# Patient Record
Sex: MALE | Race: BLACK OR AFRICAN AMERICAN | Employment: UNEMPLOYED | URBAN - METROPOLITAN AREA
[De-identification: names, ages, dates, MRNs, and addresses within clinical notes are randomized per-mention and may not be internally consistent; named-entity substitution may affect disease eponyms.]

---

## 2017-01-10 ENCOUNTER — TELEPHONE (OUTPATIENT)
Dept: FAMILY MEDICINE | Facility: CLINIC | Age: 22
End: 2017-01-10

## 2017-01-10 NOTE — TELEPHONE ENCOUNTER
Called Ravindra at the request of the psychiatry consult team to find out if he was still interested in psychiatry services.  He was scheduled for a psychiatry consult at the beginning of this month and cancelled the appt.  He also had an appt with MARTINA Alexis for therapy, but this appt was missed.    Ravindra states he is feeling better, not taking medications currently, and not interested in pursuing a psychiatry appt right now.  He states he is still interested in therapy.  We rescheduled him with Tania on 1/17 at 1 pm.      He is going to talk to his  so that he can get a bus token so he can get to that appt. He stated that he was able to do this.  Let him know if he is having trouble figuring out how he will get to clinic he can call here for additional assistance.

## 2021-04-09 ENCOUNTER — HOSPITAL ENCOUNTER (EMERGENCY)
Facility: CLINIC | Age: 26
Discharge: NURSING, REHAB OR LONG-TERM CARE | End: 2021-04-10
Attending: EMERGENCY MEDICINE | Admitting: EMERGENCY MEDICINE
Payer: COMMERCIAL

## 2021-04-09 VITALS
TEMPERATURE: 98.1 F | RESPIRATION RATE: 18 BRPM | HEIGHT: 60 IN | BODY MASS INDEX: 27.07 KG/M2 | OXYGEN SATURATION: 97 % | HEART RATE: 107 BPM | WEIGHT: 137.9 LBS | DIASTOLIC BLOOD PRESSURE: 71 MMHG | SYSTOLIC BLOOD PRESSURE: 118 MMHG

## 2021-04-09 DIAGNOSIS — F25.9 SCHIZOAFFECTIVE DISORDER, UNSPECIFIED TYPE (H): ICD-10-CM

## 2021-04-09 LAB
LABORATORY COMMENT REPORT: NORMAL
SARS-COV-2 RNA RESP QL NAA+PROBE: NEGATIVE
SPECIMEN SOURCE: NORMAL

## 2021-04-09 PROCEDURE — 90791 PSYCH DIAGNOSTIC EVALUATION: CPT

## 2021-04-09 PROCEDURE — 99203 OFFICE O/P NEW LOW 30 MIN: CPT | Performed by: PSYCHIATRY & NEUROLOGY

## 2021-04-09 PROCEDURE — 99207 PR CDG-CODE CATEGORY CHANGED: CPT | Performed by: PSYCHIATRY & NEUROLOGY

## 2021-04-09 PROCEDURE — C9803 HOPD COVID-19 SPEC COLLECT: HCPCS

## 2021-04-09 PROCEDURE — 87635 SARS-COV-2 COVID-19 AMP PRB: CPT | Performed by: EMERGENCY MEDICINE

## 2021-04-09 PROCEDURE — 99285 EMERGENCY DEPT VISIT HI MDM: CPT | Mod: 25

## 2021-04-09 RX ORDER — FLUOXETINE 10 MG/1
10 CAPSULE ORAL DAILY
COMMUNITY

## 2021-04-09 RX ORDER — OLANZAPINE 5 MG/1
5 TABLET ORAL AT BEDTIME
COMMUNITY
End: 2021-05-21

## 2021-04-09 RX ORDER — LANOLIN ALCOHOL/MO/W.PET/CERES
1000 CREAM (GRAM) TOPICAL DAILY
COMMUNITY

## 2021-04-09 RX ORDER — MIRTAZAPINE 30 MG/1
30 TABLET, FILM COATED ORAL AT BEDTIME
COMMUNITY

## 2021-04-09 ASSESSMENT — ENCOUNTER SYMPTOMS
DYSPHORIC MOOD: 0
DYSPHORIC MOOD: 1
NERVOUS/ANXIOUS: 0
NERVOUS/ANXIOUS: 1
HALLUCINATIONS: 1
SHORTNESS OF BREATH: 1

## 2021-04-09 ASSESSMENT — MIFFLIN-ST. JEOR: SCORE: 1400.39
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
## 2021-04-10 NOTE — PLAN OF CARE
Discharge instructions reviewed with patient including follow-up care plan. Educated on medication regime and advised not to stop prescribed medication without consulting their physician. Reviewed safety plan and outpatient resources.Denies SI. All belongings which where brought into the hospital have been returned to patient. Escorted off the unit at 0010  accompanied by [facility] and Michelle Discharged to [facility] via Taxi.

## 2021-04-10 NOTE — DISCHARGE INSTRUCTIONS
Follow up with your established providers    Follow up with [facility] ([phone]) to establish case management

## 2021-04-10 NOTE — PLAN OF CARE
Problem: Adult Inpatient Plan of Care  Goal: Plan of Care Review  Outcome: Adequate for Discharge  Goal: Patient-Specific Goal (Individualized)  Outcome: Adequate for Discharge  Goal: Absence of Hospital-Acquired Illness or Injury  Outcome: Adequate for Discharge  Goal: Optimal Comfort and Wellbeing  Outcome: Adequate for Discharge  Goal: Readiness for Transition of Care  Outcome: Adequate for Discharge     Problem: Behavioral Health Plan of Care  Goal: Plan of Care Review  Outcome: Adequate for Discharge  Goal: Patient-Specific Goal (Individualization)  Outcome: Adequate for Discharge  Goal: Adheres to Safety Considerations for Self and Others  Outcome: Adequate for Discharge  Goal: Absence of New-Onset Illness or Injury  Outcome: Adequate for Discharge  Goal: Optimized Coping Skills in Response to Life Stressors  Outcome: Adequate for Discharge  Goal: Develops/Participates in Therapeutic [place] to Support Successful Transition  Outcome: Adequate for Discharge     Problem: Social, Occupational or Functional Impairment (Psychotic Signs/Symptoms)  Goal: Enhanced Social, Occupational or Functional Skills (Psychotic Signs/Symptoms)  Outcome: Adequate for Discharge     Problem: Sleep Disturbance (Psychotic Signs/Symptoms)  Goal: Improved Sleep (Psychotic Signs/Symptoms)  Outcome: Adequate for Discharge     Problem: Sensory Perception Impairment (Psychotic Signs/Symptoms)  Goal: Decreased Sensory Symptoms (Psychotic Signs/Symptoms)  Outcome: Adequate for Discharge     Problem: Cognitive Impairment (Psychotic Signs/Symptoms)  Goal: Optimal Cognitive Function (Psychotic Signs/Symptoms)  Outcome: Adequate for Discharge     Problem: Behavior Regulation Impairment (Psychotic Signs/Symptoms)  Goal: Improved Behavioral Control (Psychotic Signs/Symptoms)  Outcome: Adequate for Discharge

## 2021-04-10 NOTE — ED PROVIDER NOTES
ED Psychiatric EmPATH Note  [facility] Emergency Department - EmPATH Unit    Ravindra Oro MRN: [id number]   Age: 26 year old YOB: 1995     History     Chief Complaint   Patient presents with     Mental Health Problem     The history is provided by the patient and medical records.     Ravindra Oro is a 26 year old male who comes to the EmPATH due to him being paranoid.  He came from a homeless shelter due to feeling that he was being poisoned. He was complaining of chest pain but then that improved once he was at the ED due to not having the poisoned air.  Per the records, this is baseline for him. He is not suicidal or homicidal. He is not responding to internal stimuli and denies hallucinations. He has his medications with him.  He would like to go back to a shelter or crisis bed.      Past Medical History  No past medical history on file.  No past surgical history on file.  Fluocinolone Acetonide (DERMA-SMOOTHE/FS SCALP) 0.01 % OIL  ketoconazole (NIZORAL) 2 % shampoo  cholecalciferol (VITAMIN D) 1000 UNIT tablet  cyanocobalamin (VITAMIN B-12) 1000 MCG tablet  FLUoxetine (PROZAC) 10 MG capsule  FLUoxetine (PROZAC) 20 MG capsule  mirtazapine (REMERON) 30 MG tablet  OLANZapine (ZYPREXA) 5 MG tablet      No Known Allergies  Family History  Family History   Problem Relation Age of Onset     Cancer No family hx of         No family history of skin cancer     Melanoma No family hx of      Skin Cancer No family hx of      Social History   Social History     Tobacco Use     Smoking status: Never Smoker     Smokeless tobacco: Never Used   Substance Use Topics     Alcohol use: No     Drug use: No      Past medical history, past surgical history, medications, allergies, family history, and social history were reviewed with the patient. No additional pertinent items.       Review of Systems   Psychiatric/Behavioral: Negative for dysphoric mood, self-injury and suicidal ideas. Hallucinations: paranoid. The  "patient is not nervous/anxious.      A complete review of systems was performed with pertinent positives and negatives noted in the HPI, and all other systems negative.    Physical Examination   BP: (!) 130/91  Pulse: 87  Temp: 99.1  F (37.3  C)  Resp: 11  Height: 144.8 cm (4' 9\")  Weight: 62.6 kg (137 lb 14.4 oz)  SpO2: 100 %    Physical Exam  General:  Appears stated age.   Neuro: alert and fully oriented. CN II-XII grossly intact. Grossly normal strength and sensation in all extremities.   Integumentary/Skin: no rash visualized, normal color    Psychiatric Examination   Appearance: awake, alert  Attitude:  cooperative  Eye Contact:  good  Mood:  good  Affect:  appropriate and in normal range  Speech:  clear, coherent  Psychomotor Behavior:  no evidence of tardive dyskinesia, dystonia, or tics  Throught Process:  goal oriented  Associations:  no loose associations  Thought Content:  no evidence of suicidal ideation or homicidal ideation and chronic paranoia  Insight:  fair  Judgement:  intact  Oriented to:  time, person, and place  Attention Span and Concentration:  intact  Recent and Remote Memory:  intact    ED Course        Labs Ordered and Resulted from Time of ED Arrival Up to the Time of Departure from the ED   SARS-COV-2 (COVID-19) VIRUS RT-PCR       Assessments & Plan (with Medical Decision Making)   Patient presenting with chronic paranoia. Nursing notes reviewed.     Ravindra will be discharged. He is not an imminent risk to himself or others. He has chronic paranoia that is baseline for him. He will be set up with a crisis bed at [facility].  He has his medications with him.      I have reviewed the DEC assessment complete by Lenard Paiz dated 4/9/21.    Preliminary diagnosis:  Schizoaffective disorder    --  Cj Cortez MD   [facility] EMERGENCY DEPT  EmPATH Unit  4/9/2021        Cj Cortez MD  04/09/21 2209    "

## 2021-04-10 NOTE — CONSULTS
4/9/2021  Ravindra Oro 1995     [facility] Mental Health Assessment:    Started at: 8:45 pm   Completed at: 10:15 pm  What type of assessment are you doing today? Full DA    1.  Presenting Problem:      Referral Method to ED? Community Provider and Medics     What brings the patient to the ED today? Patient complained of someone poisoning his room at [facility] so staff called for EMS to have him assessed.  He presents with paranoia and depression with a lengthy history of homelessness.     Has this happened before? Yes Most recent was 3/25/2021.  Several ED visits during February 2021    Duration of presenting problem: ongoing.     Additional Stressors: homelessness, paranoia    2.  Risk Assessment:  Suicide and Self-Harm    ESS-6  1.a. Over the past 2 weeks, have you had thoughts of killing yourself? No   1.b. Have you ever attempted to kill yourself and, if yes, when did this last happen? No  2. Recent or current suicide plan? No  3. Recent or current intent to act on ideation? No  4. Lifetime psychiatric hospitalization? No  5. Pattern of excessive substance use? No  6. Current irritability, agitation, or aggression? No  ESS-6 Score: 0    SI: N/A  Plan: No  Intent: No   Prior Attempts: No     Protective Factors: wants to work with case management, wants housing, wants a job    Hopes and goals for the future: get housing, get a job, not be followed by police anymore    Coping Skills: What helps and doesn't help? Taking medications    Additional Risk Factors Related to Safety and Suicide: No    Is the patient engaged in self injurious behaviors? No     Risk to Others    Aggressive/Assaultive/Homicidal Risk Factors: No     Duty to Warn? No     Was a Child Protection Report Made? No       Was a Adult Protection Report Made? No        Sexually inappropriate behavior? No        Vulnerability to sexual exploitation? No     Additional information: n/a      3. Mental Health Symptoms and Substance Use  Current  Symptoms and Mental Health History    GAIN Short Screener (GAIN-SS) administered? NA    Attention, Hyperactivity, and Impulsivity Symptoms      Patient reported symptoms related to hyperactivity, inattention, or impulsivity? No  Patient does not endorse these symptoms.  He believes he has a good sense of control.      Anxiety Symptoms    Patient reported anxiety symptoms? Yes: Generalized Symptoms: Pacing     Patient was observed to be pacing in the sensory room while talking to the camera as I approached to complete this assessment.  He denies having any other anxiety or symptoms related.     Behavioral Difficulties    Patient reported behavioral difficulties? No   Patient presents with a calm demeanor, open to talking with me, states there are some personal questions that can be asked by a provider but not by someone else, and identifies feeling comfortable talking to me.     Mood Symptoms    Patient reported mood disorder symptoms? Yes: Sad, depressed mood    Patient endorses being depressed and wants to stopped being followed by the .     Eating Disorders and Appetite Disturbance      Patient reported appetite symptoms? Yes: Other: Patient asked if there is a meal available for him to eat but decliend to walked with me to the food area on the unit to see what was available.  When I went back to provide him options of what is availalbe, he declined again.        SCOFF  Do you make yourself sick (induce vomiting) because you feel uncomfortably full? n/a   Do you worry that you have lost Control over how much you eat? n/a  Have you recently lost more than 14 lb in a three-month period? n/a   Do you think you are too fat, even though others say you are too thin? n/a   Would you say that food dominates your life? n/a  SCOFF Score: n/a    Patient reported appetite or eating disorder symptoms? No      Interpersonal Functioning     Patient reported difficulties that may be associated with personality and  interpersonal functioning? No  Patient is approachable and conversant.  Chart review indicates he likes to spend time alone while he was at [facility].     Learning Disabilities/Cognitive/Developmental Disorders    Patient reported concerns related to learning disabilities, cognitive challenges, and/or developmental disorders? No   Patient does not endorse or have any observable limitations in this area.     General Cognitive Impairments    Patient reported symptoms of cognitive impairments? No  If yes, complete Mini-Cog Assessment.    Sleep Disturbance    Patient reported difficulties with sleep? No   Patient reports that he is able to sleep just fine if he has a safe place to sleep at night.      Psychosis Symptoms    Patient reported symptoms of psychosis? Yes: Delusions: Paranoid: believes that the government is following him and poisoning the air around him.  and Paranoia    Patient declines hallucinations.  He does not identify the paranoia as a delusional thought.     Trauma and Post-Traumatic Stress Disorder    Physical Abuse: No   Emotional/Psychological Abuse: No  Sexual Abuse: No  Loss of a friend or family member to suicide: No  Other Traumatic Event: No     Patient reported trauma related symptoms? No   Patient does not endorse having any trauma or related symptoms.     Impact of Mental Health on Functioning      Negative Impact Score: 4/10   Subjective Impact on functioning: patient does not seem to affected by today's events.  He just wants to make sure he can get his backpack from [facility] and get connected with a county .   How do symptoms vary from baseline? This appears to be his baseline per chart notes and his history.     Current and Historical Substance Use Note:    IIs there a history of, or current, substance use? No     Have you been to chemical dependency treatment or detox before? No     CAGE-AID    Have you felt you ought to cut down on your drinking or drug  use? No     Have people annoyed you by criticizing your drinking or drug use? No   Have you felt bad or guilty about your drinking or drug use? No  Have you ever had a drink or used drugs first thing in the morning to steady your nerves or to get rid of a hangover? No   CAGE-AID Score: 0/4    Drug screen completed? No   BAL/Breathalyzer completed? No       Mental Status Exam:    Affect: Appropriate  Appearance: Other: slightly disheveled appearance due to being faintly malodorous   Attention Span/Concentration: Attentive    Eye Contact: Variable  Fund of Knowledge: Appropriate   Language /Speech Content: Fluent  Language /Speech Volume: Normal   Language /Speech Rate/Productions: Normal but slightly mumbled  Recent Memory: Intact  Remote Memory: Intact  Mood: Depressed and Sad   Orientation:   Person: Yes   Place: Yes  Time of Day: Yes   Date: Yes   Situation (Do they understand why they are here?): Yes   Psychomotor Behavior: Normal   Thought Content: Delusions, Paranoia and Other: yet seems to be baseline  Thought Form: Intact    4. Social and Environmental Conditions   Is the patient their own guardian? Yes    Living Situation: Homeless, duration: for as long as he can remember    Support system and quality of connections: no personal supports identified even when asked about family or friends    Income source: General Assistance: and EBT    Issues with employment or education: Yes wants to work but cannot due to having an  Green Card    Legal Concerns  Do you have any history of or current involvement with the legal system? No    Spiritual and Cultural Influences  Do you have any Jehovah's witness beliefs that are important in your life? No     Do you have any cultural influences in your life that impact your mental health care? No        5. Psychiatric History, Medical History, and Current Care      Patient Mental Health Services   Does the patient have a history of mental health concerns/diagnoses? Yes  Schizoaffective Disorder, Depression, PTSD     Current Providers  Primary Care Provider: Yes Leonel Dior; [facility]   Psychiatrist: No   Therapist: Yes he reports having one but does not know where they are located   : No   ARM: Yes reports having this service but cannot identify the provider   ACT Team: No   Other: Yes [facility] advocate but does not know their name    History of Commitment? No  History of Psychiatric Hospitalizations? Yes in 2020 and several ED visits for mental health in 2021 so far   History of programmatic care? No    Family Mental Health History   Family History of Mental Health or Chemical Dependency Issues? No     Development and Physical Health Challenges  Delays or concerns meeting developmental milestones? No  Current psychotropic medications? Yes does not know the names of his names   Medication Compliant? Yes   Recent medication changes? No    History of concussion or TBI? No     Additional Information: Patient does not recall many details of his history.     6. Collateral Information and Collaboration    Collaboration with medical staff:Referral Information:   Medical Records and Psychiatry     Collateral Information/Sources: Medical Records: EPIC notes    7. Assessment and Diagnosis  Assessment of patient strengths and vulnerabilities    Strengths, Protective Factors, & Community Resources: identifies having good self control, does not use alcohol or drugs, awareness of community crisis resources, wants to go back to [facility], wants to be employed    Patient skills, abilities, and coping skills (what is going well?): awareness of community crisis resources, clear communication, identifies writer as a good person    Patient vulnerabilities: homelessness, lack of personal and professional supports, limited income, limited ability to follow up on appointments and with providers    Diagnosis  F25.9 Schizoaffective Disorder    8.Therapeutic  Methodologies Utilized in Assessment    Psychotherapy techniques and/or interventions used: Establishing rapport, Active listening, Establish a discharge plan and Safety planning    9. Patient Care/Treatment Plan  Summary of Patient Presentation and needs  What are the basic needs for this patient in this moment? Find a crisis residence to go to tonight      Consultations :  Attending provider consulted? Yes  Attending Name: Dr. Cortez   Attending concurs with disposition? Yes     Recommended disposition: Residential Treatment: [facility]     Does the patient agree with the recommended level of care? Yes    Final disposition: Residential treatment: [facility]    Disposition Details: discharging to [facility]    If Inpatient, is patient admitted voluntary? Yes   Patient aware of potential for transfer if there is not appropriate placement? NA  Patient is willing to travel outside of the [facility] for placement? NA   Central Intake Notified? No    10. Patient Care Document: Safety and After Care Planning:          Safety Plan Provided? No    Follow-Up Plans and Providers: connect with [facility] to establish case management services; stay at [facility] for the full length of stay    Follow-Up Plan:  After care plan provided to the patient/guardian by: yes via AVS  After care plan provided to any additional sources/parties? No    Duration of face to face time with patient in minutes: .75 hrs    CPT code(s) utilized: 68168 - Psychotherapy for Crisis - 60 (30-74*) min      JAGDISH Davis

## 2021-04-10 NOTE — PLAN OF CARE
"Problem: Behavior Regulation Impairment (Psychotic Signs/Symptoms)  Goal: Improved Behavioral Control (Psychotic Signs/Symptoms)  Outcome: Improving    26 year old male received from ER due to paranoid psychosis, anxiety, and depression. Patient reported in the ED that the government was out to attack him and that his crisis house was trying to poison him. However, he denied all paranoid thoughts once he arrived on [facility] unit and stated, \"I feel safe at my home. The people are so nice there\". Currently denies SI/HI. Patient presents with tense, paranoid and anxious in mood. Patient search was not completed due to patient declination. This was approved by MD for patient to come on the unit. Nursing assessment complete including patient and medication profiles. Risk assessments completed addressing suicide and safety issues. Care plan initiated. Video monitoring in progress, Patient Informed.       "

## 2021-04-25 ENCOUNTER — HOSPITAL ENCOUNTER (EMERGENCY)
Facility: CLINIC | Age: 26
Discharge: HOME | End: 2021-04-25
Attending: EMERGENCY MEDICINE | Admitting: EMERGENCY MEDICINE
Payer: COMMERCIAL

## 2021-04-25 VITALS
OXYGEN SATURATION: 97 % | RESPIRATION RATE: 16 BRPM | HEART RATE: 76 BPM | SYSTOLIC BLOOD PRESSURE: 136 MMHG | TEMPERATURE: 97.4 F | DIASTOLIC BLOOD PRESSURE: 85 MMHG

## 2021-04-25 DIAGNOSIS — F25.9 SCHIZOAFFECTIVE DISORDER, UNSPECIFIED TYPE (H): ICD-10-CM

## 2021-04-25 LAB
AMPHETAMINES UR QL SCN: NEGATIVE
BARBITURATES UR QL: NEGATIVE
BENZODIAZ UR QL: NEGATIVE
CANNABINOIDS UR QL SCN: NEGATIVE
COCAINE UR QL: NEGATIVE
ETHANOL UR QL SCN: NEGATIVE
OPIATES UR QL SCN: NEGATIVE

## 2021-04-25 PROCEDURE — 90791 PSYCH DIAGNOSTIC EVALUATION: CPT

## 2021-04-25 PROCEDURE — 80320 DRUG SCREEN QUANTALCOHOLS: CPT | Performed by: EMERGENCY MEDICINE

## 2021-04-25 PROCEDURE — 250N000013 HC RX MED GY IP 250 OP 250 PS 637: Performed by: EMERGENCY MEDICINE

## 2021-04-25 PROCEDURE — 80307 DRUG TEST PRSMV CHEM ANLYZR: CPT | Performed by: EMERGENCY MEDICINE

## 2021-04-25 PROCEDURE — 99283 EMERGENCY DEPT VISIT LOW MDM: CPT | Performed by: EMERGENCY MEDICINE

## 2021-04-25 PROCEDURE — 99285 EMERGENCY DEPT VISIT HI MDM: CPT | Mod: 25 | Performed by: EMERGENCY MEDICINE

## 2021-04-25 RX ORDER — OLANZAPINE 10 MG/1
10 TABLET, ORALLY DISINTEGRATING ORAL ONCE
Status: DISCONTINUED | OUTPATIENT
Start: 2021-04-25 | End: 2021-04-25 | Stop reason: HOSPADM

## 2021-04-25 RX ORDER — ACETAMINOPHEN 325 MG/1
975 TABLET ORAL ONCE
Status: COMPLETED | OUTPATIENT
Start: 2021-04-25 | End: 2021-04-25

## 2021-04-25 RX ADMIN — ACETAMINOPHEN 975 MG: 325 TABLET, FILM COATED ORAL at 11:52

## 2021-04-25 ASSESSMENT — ENCOUNTER SYMPTOMS: DYSPHORIC MOOD: 1

## 2021-04-25 NOTE — ED NOTES
Bed: HW01  Expected date: 4/25/21  Expected time: 8:41 AM  Means of arrival:   Comments:  [address]  26M  SI

## 2021-04-25 NOTE — ED PROVIDER NOTES
"ED Provider Note  [facility]      History     Chief Complaint   Patient presents with     Suicidal     EMS picked up at [facility], wanted  to kill him     Mental Health Problem     Delusional and paranoid thinking.     HPI  Ravindra Oro is a 26 year old male with a PMH of homelessness, schizoaffective disorder, severe MDD, ESCOBAR, PTSD and delusional disorder who presents to the ED today complaining of a mental health issue.  Patient states he is tired of life and thinks it will not get better.  He states he moved here from [facility] 10 years ago and is thinking about moving back.  He states he regrets coming to [place].  He states he was in school, but is not anymore.  He states he is currently homeless and needs to be staying at the [facility].  Patient states he works tomorrow at 7:30 AM.  Here in the ED he endorses depression.  Patient denies use of any drugs or alcohol.    Patient was seen earlier today at [facility] for paranoid delusions and psychosis.  He believed police officers were following him around the community.  He also believed that people are poisoning him and damaging his face.  He stated that \"this is not my real face.\"  He asked for assistance to leave [place].    Past Medical History  No past medical history on file.  No past surgical history on file.  cholecalciferol (VITAMIN D) 1000 UNIT tablet  cyanocobalamin (VITAMIN B-12) 1000 MCG tablet  Fluocinolone Acetonide (DERMA-SMOOTHE/FS SCALP) 0.01 % OIL  FLUoxetine (PROZAC) 10 MG capsule  FLUoxetine (PROZAC) 20 MG capsule  ketoconazole (NIZORAL) 2 % shampoo  mirtazapine (REMERON) 30 MG tablet  OLANZapine (ZYPREXA) 5 MG tablet      No Known Allergies  Family History  Family History   Problem Relation Age of Onset     Cancer No family hx of         No family history of skin cancer     Melanoma No family hx of      Skin Cancer No family hx of      Social History   Social History     Tobacco Use     " Smoking status: Never Smoker     Smokeless tobacco: Never Used   Substance Use Topics     Alcohol use: No     Drug use: No      Past medical history, past surgical history, medications, allergies, family history, and social history were reviewed with the patient. No additional pertinent items.       Review of Systems   Constitutional: Negative for fever.   Respiratory: Negative for shortness of breath.    Cardiovascular: Negative for chest pain.   Gastrointestinal: Negative for abdominal pain.   Psychiatric/Behavioral: Positive for behavioral problems and dysphoric mood. Negative for suicidal ideas.   All other systems reviewed and are negative.      Physical Exam   BP: 132/67  Pulse: 76  Temp: 97.6  F (36.4  C)  Resp: 16  SpO2: 98 %  Physical Exam  Vitals signs and nursing note reviewed.   Constitutional:       General: He is not in acute distress.     Appearance: He is well-developed.   HENT:      Head: Normocephalic.   Eyes:      Extraocular Movements: Extraocular movements intact.   Neck:      Musculoskeletal: Neck supple.   Pulmonary:      Effort: No respiratory distress.   Musculoskeletal:         General: No deformity.   Skin:     General: Skin is dry.   Neurological:      Mental Status: He is alert.      Comments: Oriented, rambling speech   Psychiatric:         Behavior: Behavior normal.       ED Course     10:48 AM  The patient was seen and examined by Jim Cortez DO in Room ED16B.     Procedures             Results for orders placed or performed during the hospital encounter of 04/25/21   Drug abuse screen 6 urine (chem dep)     Status: None   Result Value Ref Range    Amphetamine Qual Urine Negative NEG^Negative    Barbiturates Qual Urine Negative NEG^Negative    Benzodiazepine Qual Urine Negative NEG^Negative    Cannabinoids Qual Urine Negative NEG^Negative    Cocaine Qual Urine Negative NEG^Negative    Ethanol Qual Urine Negative NEG^Negative    Opiates Qualitative Urine Negative NEG^Negative      Medications   acetaminophen (TYLENOL) tablet 975 mg (975 mg Oral Given 4/25/21 1152)        Assessments & Plan (with Medical Decision Making)   26-year-old man male presents to us with a chief complaint of paranoia and possible suicidal thoughts.  Patient denies any suicidal ideation at this point.  He is quite fixated on the that he wishes he never come to the [place].  He was evaluated by myself as well as mental with .  At this point the patient seems to be at his baseline and is not actively suicidal.  We will discharge him.    I have reviewed the nursing notes. I have reviewed the findings, diagnosis, plan and need for follow up with the patient.    Discharge Medication List as of 4/25/2021  1:13 PM          Final diagnoses:   Schizoaffective disorder, unspecified type (H)   IMicky am serving as a trained medical scribe to document services personally performed by Jim Cortez DO, based on the provider's statements to me.     I, Jim Cortez DO, was physically present and have reviewed and verified the accuracy of this note documented by Micky Dial.      --  Jim Cortez DO  [facility] EMERGENCY DEPARTMENT  4/25/2021     Jim Cortez DO  04/27/21 0024

## 2021-04-25 NOTE — ED NOTES
"Pt making statements about people \"poisoning his face\", forcing him into slavery, pt believes he is currently in [place]. Pt believes police are following him.  Pt states he regrets coming to [place] as an immigrant.  People says he has to deal with satanic people everyday.    "

## 2021-04-27 ASSESSMENT — ENCOUNTER SYMPTOMS
ABDOMINAL PAIN: 0
FEVER: 0
SHORTNESS OF BREATH: 0

## 2021-04-29 ENCOUNTER — HOSPITAL ENCOUNTER (EMERGENCY)
Facility: CLINIC | Age: 26
Discharge: HOME | End: 2021-04-30
Attending: EMERGENCY MEDICINE | Admitting: EMERGENCY MEDICINE
Payer: COMMERCIAL

## 2021-04-29 DIAGNOSIS — F25.9 SCHIZOAFFECTIVE DISORDER, SUBCHRONIC CONDITION (H): ICD-10-CM

## 2021-04-29 DIAGNOSIS — F22 DELUSIONAL DISORDER (H): ICD-10-CM

## 2021-04-29 PROCEDURE — 99284 EMERGENCY DEPT VISIT MOD MDM: CPT | Performed by: EMERGENCY MEDICINE

## 2021-04-29 PROCEDURE — 99285 EMERGENCY DEPT VISIT HI MDM: CPT | Mod: 25

## 2021-04-29 PROCEDURE — 80320 DRUG SCREEN QUANTALCOHOLS: CPT | Performed by: EMERGENCY MEDICINE

## 2021-04-29 PROCEDURE — 80307 DRUG TEST PRSMV CHEM ANLYZR: CPT | Performed by: EMERGENCY MEDICINE

## 2021-04-29 PROCEDURE — 250N000013 HC RX MED GY IP 250 OP 250 PS 637: Performed by: EMERGENCY MEDICINE

## 2021-04-29 PROCEDURE — 90791 PSYCH DIAGNOSTIC EVALUATION: CPT

## 2021-04-29 RX ORDER — ACETAMINOPHEN 325 MG/1
650 TABLET ORAL ONCE
Status: COMPLETED | OUTPATIENT
Start: 2021-04-29 | End: 2021-04-29

## 2021-04-29 RX ADMIN — ACETAMINOPHEN 650 MG: 325 TABLET, FILM COATED ORAL at 23:20

## 2021-04-30 VITALS
HEART RATE: 93 BPM | RESPIRATION RATE: 16 BRPM | TEMPERATURE: 97.6 F | DIASTOLIC BLOOD PRESSURE: 98 MMHG | SYSTOLIC BLOOD PRESSURE: 138 MMHG | OXYGEN SATURATION: 99 %

## 2021-04-30 NOTE — DISCHARGE INSTRUCTIONS
You have been seen in the ER for your chronic mental health issues.  We recommend that you follow up with the crisis resources that you have been given if you are interested in further treatment and help with your chronic mental health issues.

## 2021-04-30 NOTE — ED PROVIDER NOTES
"ED Provider Note  [facility]      History     Chief Complaint   Patient presents with     Suicidal     SI with plan to drink himself to death. Was released from hospital 2 days ago     HPI  Ravindra Oro is a 26 year old male with a PMH of delusional disorder, anxiety, severe MDD, PTSD, schizoaffective disorder, psychosis and homelessness who presents to the ED today complaining of suicidal ideation.  Patient is vague about his SI, not willing to endorse or deny a plan.  He asked the  to be sent to [facility], however he was currently just there for 9 days, only having 1 day left to stay, so they will not take him back.  Patient has not had outpatient services set up and does not often use providers.  Patient is disorganized and tangential.  He is paranoid about the police, the country in general and thinks he is being poisoned which is causing his nose to grow.  Patient does endorse SI, but will not go into detail if he has a plan or not.  He denied a history of previous suicide attempts.  Per , it seemed as though patient had not been taking his medications.  Patient uses the shelter system and knows about the crisis centers in the area.    Patient reports that he feels like \"this country is racist and it is in your DNA \".  He repeatedly talks about \"drinking blood\" but will not specify who it is that he thinks is doing that. He repeatedly asks to be \"deported\" back to hospitals and feels that everything will be better once he is back there.  He calls the police to have them send him back home or to have them help him get a job so he can earn money to buy a ticket to go back to hospitals. He doesn't believe that anyone will help him. Denies drugs or alcohol.     Past Medical History  History reviewed. No pertinent past medical history.  History reviewed. No pertinent surgical history.  cholecalciferol (VITAMIN D) 1000 UNIT tablet  cyanocobalamin (VITAMIN B-12) 1000 MCG " tablet  Fluocinolone Acetonide (DERMA-SMOOTHE/FS SCALP) 0.01 % OIL  FLUoxetine (PROZAC) 10 MG capsule  FLUoxetine (PROZAC) 20 MG capsule  ketoconazole (NIZORAL) 2 % shampoo  mirtazapine (REMERON) 30 MG tablet  OLANZapine (ZYPREXA) 5 MG tablet      No Known Allergies  Family History  Family History   Problem Relation Age of Onset     Cancer No family hx of         No family history of skin cancer     Melanoma No family hx of      Skin Cancer No family hx of      Social History   Social History     Tobacco Use     Smoking status: Never Smoker     Smokeless tobacco: Never Used   Substance Use Topics     Alcohol use: No     Drug use: No      Past medical history, past surgical history, medications, allergies, family history, and social history were reviewed with the patient. No additional pertinent items.       Review of Systems   Unable to perform ROS: Mental status change     A complete review of systems was attempted but limited due to psychiatric problem.    Physical Exam   BP: (!) 138/98  Pulse: 93  Temp: 97.6  F (36.4  C)  Resp: 16  SpO2: 99 %  Physical Exam  Vitals signs and nursing note reviewed.   Constitutional:       Appearance: He is well-developed.      Comments: Alert, guarded, suspicious, tangential.   HENT:      Head: Normocephalic.   Eyes:      Pupils: Pupils are equal, round, and reactive to light.   Cardiovascular:      Rate and Rhythm: Normal rate and regular rhythm.      Heart sounds: Normal heart sounds. No murmur. No gallop.    Pulmonary:      Effort: Pulmonary effort is normal. No respiratory distress.      Breath sounds: Normal breath sounds. No wheezing or rales.   Abdominal:      General: Bowel sounds are normal. There is no distension.      Palpations: Abdomen is soft.      Tenderness: There is no abdominal tenderness. There is no guarding or rebound.   Skin:     General: Skin is warm and dry.   Neurological:      Mental Status: He is alert.   Psychiatric:         Speech: Speech normal.          Behavior: Behavior normal.         Thought Content: Thought content is paranoid.      Comments: Alert, guarded, suspicious. Tangential and disorganized. Not agitated or aggressive. SI but will not specify if he has a plan. No HI. Does not appear to obviously be attending to internal stimuli. Paranoid.         ED Course      Procedures        The medical record was reviewed and interpreted.       Results for orders placed or performed during the hospital encounter of 04/29/21   Drug abuse screen 6 urine (tox)     Status: None   Result Value Ref Range    Amphetamine Qual Urine Negative NEG^Negative    Barbiturates Qual Urine Negative NEG^Negative    Benzodiazepine Qual Urine Negative NEG^Negative    Cannabinoids Qual Urine Negative NEG^Negative    Cocaine Qual Urine Negative NEG^Negative    Ethanol Qual Urine Negative NEG^Negative    Opiates Qualitative Urine Negative NEG^Negative     Medications   acetaminophen (TYLENOL) tablet 650 mg (650 mg Oral Given 4/29/21 2320)        Assessments & Plan (with Medical Decision Making)   Patient presents to the emergency department today with mental health evaluation. He is very difficult to assess, very paranoid, tangential, disorganized, though per record review this does appear to be baseline.  He has had 10 ED visits in the past 7 days at various hospitals across the [facility]. He tells me he has had SI but will not endorse or deny a plan.  He was seen by the [facility] , please see her note for full details. He is not a candidate for ReEntry facility as he has already been there 9 days recently.  This does appear to be his chronic baseline psychiatric disease - he historically is not interested in meds, or following up with services.  At this point we will discharge, we did give him crisis information.    I have reviewed the nursing notes. I have reviewed the findings, diagnosis, plan and need for follow up with the patient.    New Prescriptions    No medications on file        Final diagnoses:   Delusional disorder (H)   IMicky, am serving as a trained medical scribe to document services personally performed by Alia Carrion MD, based on the provider's statements to me.     Alia KELLOGG MD, was physically present and have reviewed and verified the accuracy of this note documented by Micky Dial.      --  Alia Carrion MD  [facility] EMERGENCY DEPARTMENT  4/29/2021     Alia Carrion MD  04/30/21 0023

## 2021-04-30 NOTE — ED TRIAGE NOTES
Pt brought in by EMS with c/o SI. Per EMS pt told them he planned to drink himself to death. Pt to this RN that he went to [facility] and was going to jump into [place]. Pt denies substance use. Pt refused alcohol breath test. Pt calm, cooperative.

## 2021-04-30 NOTE — ED NOTES
Bed: HW02  Expected date:   Expected time:   Means of arrival:   Comments:  [id number]  26y M  Psych eval

## 2021-05-01 ENCOUNTER — HOSPITAL ENCOUNTER (EMERGENCY)
Facility: CLINIC | Age: 26
Discharge: HOME | End: 2021-05-01
Attending: EMERGENCY MEDICINE | Admitting: EMERGENCY MEDICINE
Payer: COMMERCIAL

## 2021-05-01 VITALS
SYSTOLIC BLOOD PRESSURE: 132 MMHG | HEART RATE: 77 BPM | DIASTOLIC BLOOD PRESSURE: 89 MMHG | RESPIRATION RATE: 20 BRPM | TEMPERATURE: 96.5 F | OXYGEN SATURATION: 98 %

## 2021-05-01 DIAGNOSIS — F33.9 EPISODE OF RECURRENT MAJOR DEPRESSIVE DISORDER, UNSPECIFIED DEPRESSION EPISODE SEVERITY (H): ICD-10-CM

## 2021-05-01 DIAGNOSIS — Z59.00 HOMELESSNESS: ICD-10-CM

## 2021-05-01 PROCEDURE — 99285 EMERGENCY DEPT VISIT HI MDM: CPT | Mod: 25 | Performed by: EMERGENCY MEDICINE

## 2021-05-01 PROCEDURE — 99284 EMERGENCY DEPT VISIT MOD MDM: CPT | Performed by: EMERGENCY MEDICINE

## 2021-05-01 PROCEDURE — 90791 PSYCH DIAGNOSTIC EVALUATION: CPT

## 2021-05-01 SDOH — ECONOMIC STABILITY - HOUSING INSECURITY: HOMELESSNESS UNSPECIFIED: Z59.00

## 2021-05-01 ASSESSMENT — ENCOUNTER SYMPTOMS
ABDOMINAL PAIN: 0
DYSPHORIC MOOD: 1
VOMITING: 0
SHORTNESS OF BREATH: 0
MUSCULOSKELETAL NEGATIVE: 1
HEADACHES: 0
FEVER: 0
EYES NEGATIVE: 1
NAUSEA: 0

## 2021-05-01 NOTE — ED PROVIDER NOTES
"ED Provider Note  [facility]      History     Chief Complaint   Patient presents with     Suicidal     HPI  Ravindra Oro is a 26 year old male with a past medical history significant for severe depression with psychotic features, anxiety and delusional disorder who presents for depression and suicidal thoughts.  He states he is quite depressed and \"my brain is not working the right way.\".  He is thinking about [place].  He is not taking his antidepressant medications because he feels they are not helping him.  He thinks he may need new medications.  He denies current chest pain.  He denies substance use.  He is homeless and unemployed.    Past Medical History  Past Medical History:   Diagnosis Date     Depressive disorder      History reviewed. No pertinent surgical history.  cholecalciferol (VITAMIN D) 1000 UNIT tablet  FLUoxetine (PROZAC) 10 MG capsule  FLUoxetine (PROZAC) 20 MG capsule  cyanocobalamin (VITAMIN B-12) 1000 MCG tablet  Fluocinolone Acetonide (DERMA-SMOOTHE/FS SCALP) 0.01 % OIL  ketoconazole (NIZORAL) 2 % shampoo  mirtazapine (REMERON) 30 MG tablet  OLANZapine (ZYPREXA) 5 MG tablet      No Known Allergies  Family History  Family History   Problem Relation Age of Onset     Cancer No family hx of         No family history of skin cancer     Melanoma No family hx of      Skin Cancer No family hx of      Social History   Social History     Tobacco Use     Smoking status: Never Smoker     Smokeless tobacco: Never Used   Substance Use Topics     Alcohol use: No     Drug use: No      Past medical history, past surgical history, medications, allergies, family history, and social history were reviewed with the patient. No additional pertinent items.       Review of Systems   Constitutional: Negative for fever.   Eyes: Negative.    Respiratory: Negative for shortness of breath.    Cardiovascular: Negative for chest pain. " "  Gastrointestinal: Negative for abdominal pain, nausea and vomiting.   Genitourinary: Negative.    Musculoskeletal: Negative.    Skin: Negative for rash.   Neurological: Negative for headaches.   Psychiatric/Behavioral: Positive for dysphoric mood and suicidal ideas.   All other systems reviewed and are negative.        Physical Exam   BP: (!) 132/96  Pulse: 71  Temp: 97.9  F (36.6  C)  Resp: 16  SpO2: 100 %  Physical Exam  Physical Exam   Constitutional:   well nourished, well developed, resting comfortably   HENT:   Head: Normocephalic and atraumatic.   Cardiovascular: regular rate and rhythm without murmurs or gallops  Pulmonary/Chest: Clear to auscultation bilaterally, with no wheezes or retractions. No respiratory distress.  GI: Soft with good bowel sounds.  Non-tender, non-distended  Skin: Skin is warm and dry.   Neurological: alert and oriented to person, place, and time. Nonfocal exam  Psychiatric:  His speech is normal. His mood appears flat.  He is not agitated, not aggressive, not hyperactive, not actively hallucinating and not combative. He is tangential and disorganized in thought    ED Course      Procedures               No results found for any visits on 05/01/21.  Medications - No data to display     Assessments & Plan (with Medical Decision Making)       I have reviewed the nursing notes.  Emergency Department course:  The patient was seen and examined at 0709 am in hallway 1.  Chart review shows that the patient has had multiple frequent emergency department visits for similar complaints.  He has apparently homeless and has no outside support.  He is paranoid about the police and the country in general.  He thinks he is being poisoned.  He talks about being \"deported\" back to [place].  He has been at [facility] recently and has maxed out his stay there.    He was evaluated by [facility]  [facility] at about 7:30 AM.  Please see his documentation for details.. Briefly, the patient does not want " "to be admitted to the hospital.  He wants to go to a crisis center and he wants to start taking his medications, which include Zyprexa and prozac.  The patient is feeling depressed about his life.  He believes the police are poisoning the air and that is why his face is greasy.  He states he does not believe in family or parents.  He states \"God is my father.\"  He did think about back [place] because he wants to die in an interesting way and he wants people to talk about it.  He is frequently sad.  He has difficulty with sleep.    The patient is a 26-year-old male with a history of severe depression who presents with suicidal thoughts.  He has been evaluated multiple times recently in emergency departments.  He has been noncompliant with medications.  He has never attempted suicide and appears to have more passive thoughts.  He will be discharged to [facility] and was given information regarding outpatient drop- in mental health centers.        have reviewed the findings, diagnosis, plan and need for follow up with the patient.    Discharge Medication List as of 5/1/2021 12:11 PM          Final diagnoses:   Episode of recurrent major depressive disorder, unspecified depression episode severity (H)   Homelessness       --This note was created in part by the use of Dragon voice recognition dictation system. Inadvertent grammatical errors and typographical errors may still exist.  MD Dee Ji [facility] EMERGENCY DEPARTMENT  5/1/2021     Dee Evans MD  05/01/21 1617    "

## 2021-05-01 NOTE — ED TRIAGE NOTES
"Patient presents to the ED with complaint of suicidal ideation and depression. Patient has a plan to \"jump of bridge into [place]\" Patient denies HI. Patient presents with paranoia and delusions about the police being \"after [my] face\"  "

## 2021-05-01 NOTE — DISCHARGE INSTRUCTIONS
Use your cab voucher to go to [facility].   please go to walk-in counseling center for further psychiatric care..  There is a handout with information.

## 2021-05-05 ENCOUNTER — HOSPITAL ENCOUNTER (EMERGENCY)
Facility: CLINIC | Age: 26
Discharge: HOME | End: 2021-05-05
Attending: EMERGENCY MEDICINE | Admitting: EMERGENCY MEDICINE
Payer: COMMERCIAL

## 2021-05-05 VITALS
RESPIRATION RATE: 16 BRPM | TEMPERATURE: 97.5 F | HEART RATE: 66 BPM | DIASTOLIC BLOOD PRESSURE: 90 MMHG | OXYGEN SATURATION: 99 % | SYSTOLIC BLOOD PRESSURE: 144 MMHG

## 2021-05-05 DIAGNOSIS — F25.1 SCHIZOAFFECTIVE DISORDER, DEPRESSIVE TYPE (H): ICD-10-CM

## 2021-05-05 DIAGNOSIS — Z59.00 HOMELESS: ICD-10-CM

## 2021-05-05 PROCEDURE — 99282 EMERGENCY DEPT VISIT SF MDM: CPT | Performed by: EMERGENCY MEDICINE

## 2021-05-05 SDOH — ECONOMIC STABILITY - HOUSING INSECURITY: HOMELESSNESS UNSPECIFIED: Z59.00

## 2021-05-05 ASSESSMENT — ENCOUNTER SYMPTOMS
SHORTNESS OF BREATH: 0
FEVER: 0
ABDOMINAL PAIN: 0

## 2021-05-05 NOTE — ED PROVIDER NOTES
ED Provider Note  [facility]      History     Chief Complaint   Patient presents with     Suicidal     HPI    Ravindra Oro is a 26 year old male with a PMH of homelessness, schizoaffective disorder, severe MDD, ESCOBAR, PTSD and delusional disorder who presents to the ED today complaining of a mental health issue.  Patient states he is tired of life and thinks it will not get better.  He states he moved here from [facility] 10 years ago and is thinking about moving back.  He states he regrets coming to [place].  He is currently homeless.  He states he wants us to report him back to [facility].  He denies any plan for suicide.  He does have generic thoughts of suicide chronically he states.  He denies any drugs or alcohol.    Past Medical History  Past Medical History:   Diagnosis Date     Depressive disorder      No past surgical history on file.  cholecalciferol (VITAMIN D) 1000 UNIT tablet  cyanocobalamin (VITAMIN B-12) 1000 MCG tablet  Fluocinolone Acetonide (DERMA-SMOOTHE/FS SCALP) 0.01 % OIL  FLUoxetine (PROZAC) 10 MG capsule  FLUoxetine (PROZAC) 20 MG capsule  ketoconazole (NIZORAL) 2 % shampoo  mirtazapine (REMERON) 30 MG tablet  OLANZapine (ZYPREXA) 5 MG tablet      No Known Allergies  Family History  Family History   Problem Relation Age of Onset     Cancer No family hx of         No family history of skin cancer     Melanoma No family hx of      Skin Cancer No family hx of      Social History   Social History     Tobacco Use     Smoking status: Never Smoker     Smokeless tobacco: Never Used   Substance Use Topics     Alcohol use: No     Drug use: No      Past medical history, past surgical history, medications, allergies, family history, and social history were reviewed with the patient. No additional pertinent items.       Review of Systems   Constitutional: Negative for fever.   Respiratory: Negative for shortness of breath.    Cardiovascular: Negative for chest pain.    Gastrointestinal: Negative for abdominal pain.   All other systems reviewed and are negative.    A complete review of systems was performed with pertinent positives and negatives noted in the HPI, and all other systems negative.    Physical Exam   BP: 112/77  Pulse: 66  Temp: 97.5  F (36.4  C)  Resp: 16  SpO2: 99 %  Physical Exam  Vitals signs and nursing note reviewed.   Constitutional:       General: He is not in acute distress.     Appearance: He is well-developed.   HENT:      Head: Normocephalic.   Eyes:      Extraocular Movements: Extraocular movements intact.   Neck:      Musculoskeletal: Neck supple.   Pulmonary:      Effort: No respiratory distress.   Musculoskeletal:         General: No deformity.   Skin:     General: Skin is dry.   Neurological:      Mental Status: He is alert.      Comments: Oriented   Psychiatric:         Behavior: Behavior normal.         ED Course      Procedures             No results found for any visits on 05/05/21.  Medications - No data to display     Assessments & Plan (with Medical Decision Making)   26-year-old male presents to us with a chief complaint of homelessness and suicidal thoughts.  He has been evaluated numerous times previously for similar complaints.  He is fixated on his life being better in [facility] and his desire to be deported.  He has not followed with services and historically does not take any medications.  He seems to be at his baseline status at this time.  We will let him sleep here overnight and he will be discharged in the morning    I have reviewed the nursing notes. I have reviewed the findings, diagnosis, plan and need for follow up with the patient.    New Prescriptions    No medications on file       Final diagnoses:   Schizoaffective disorder, depressive type (H)   Homeless       --  Jim MATTHEWS [facility] EMERGENCY DEPARTMENT  5/5/2021     Jim Cortez,   05/05/21 0415

## 2021-05-05 NOTE — ED NOTES
Pt states will harm self if not deported and this is why he has come to the hospital to be deported.  Pt refused to remove excess clothing, MD aware and stated was okay.

## 2021-05-06 ENCOUNTER — HOSPITAL ENCOUNTER (OUTPATIENT)
Facility: CLINIC | Age: 26
Setting detail: OBSERVATION
Discharge: OTHER INSTITUTION | End: 2021-05-07
Attending: NURSE PRACTITIONER | Admitting: NURSE PRACTITIONER
Payer: COMMERCIAL

## 2021-05-06 VITALS
HEART RATE: 68 BPM | TEMPERATURE: 97.9 F | SYSTOLIC BLOOD PRESSURE: 111 MMHG | BODY MASS INDEX: 24.97 KG/M2 | OXYGEN SATURATION: 100 % | HEIGHT: 60 IN | RESPIRATION RATE: 16 BRPM | DIASTOLIC BLOOD PRESSURE: 79 MMHG | WEIGHT: 127.2 LBS

## 2021-05-06 DIAGNOSIS — F32.A DEPRESSION: ICD-10-CM

## 2021-05-06 DIAGNOSIS — F33.1 MODERATE EPISODE OF RECURRENT MAJOR DEPRESSIVE DISORDER (H): Primary | ICD-10-CM

## 2021-05-06 DIAGNOSIS — R45.851 SUICIDAL THOUGHTS: ICD-10-CM

## 2021-05-06 DIAGNOSIS — Z59.00 HOMELESSNESS: ICD-10-CM

## 2021-05-06 LAB
AMPHETAMINES UR QL SCN: NEGATIVE
BARBITURATES UR QL: NEGATIVE
BENZODIAZ UR QL: NEGATIVE
CANNABINOIDS UR QL SCN: NEGATIVE
COCAINE UR QL: NEGATIVE
LABORATORY COMMENT REPORT: NORMAL
OPIATES UR QL SCN: NEGATIVE
PCP UR QL SCN: NEGATIVE
SARS-COV-2 RNA RESP QL NAA+PROBE: NEGATIVE
SPECIMEN SOURCE: NORMAL

## 2021-05-06 PROCEDURE — 80307 DRUG TEST PRSMV CHEM ANLYZR: CPT | Performed by: NURSE PRACTITIONER

## 2021-05-06 PROCEDURE — 99213 OFFICE O/P EST LOW 20 MIN: CPT | Performed by: PSYCHIATRY & NEUROLOGY

## 2021-05-06 PROCEDURE — C9803 HOPD COVID-19 SPEC COLLECT: HCPCS

## 2021-05-06 PROCEDURE — 99207 PR CONSULT E&M CHANGED TO SUBSEQUENT LEVEL: CPT | Performed by: PSYCHIATRY & NEUROLOGY

## 2021-05-06 PROCEDURE — G0378 HOSPITAL OBSERVATION PER HR: HCPCS

## 2021-05-06 PROCEDURE — 99285 EMERGENCY DEPT VISIT HI MDM: CPT | Mod: 25

## 2021-05-06 PROCEDURE — 87635 SARS-COV-2 COVID-19 AMP PRB: CPT | Performed by: NURSE PRACTITIONER

## 2021-05-06 PROCEDURE — 250N000013 HC RX MED GY IP 250 OP 250 PS 637: Performed by: PSYCHIATRY & NEUROLOGY

## 2021-05-06 PROCEDURE — 90791 PSYCH DIAGNOSTIC EVALUATION: CPT

## 2021-05-06 RX ORDER — OLANZAPINE 5 MG/1
5 TABLET ORAL EVERY 4 HOURS PRN
Status: DISCONTINUED | OUTPATIENT
Start: 2021-05-06 | End: 2021-05-07 | Stop reason: HOSPADM

## 2021-05-06 RX ORDER — ACETAMINOPHEN 325 MG/1
650 TABLET ORAL ONCE
Status: COMPLETED | OUTPATIENT
Start: 2021-05-06 | End: 2021-05-06

## 2021-05-06 RX ORDER — MIRTAZAPINE 15 MG/1
15 TABLET, FILM COATED ORAL AT BEDTIME
Status: DISCONTINUED | OUTPATIENT
Start: 2021-05-06 | End: 2021-05-07 | Stop reason: HOSPADM

## 2021-05-06 RX ADMIN — ACETAMINOPHEN 650 MG: 325 TABLET ORAL at 18:06

## 2021-05-06 SDOH — ECONOMIC STABILITY - HOUSING INSECURITY: HOMELESSNESS UNSPECIFIED: Z59.00

## 2021-05-06 ASSESSMENT — ENCOUNTER SYMPTOMS
ARTHRALGIAS: 0
ABDOMINAL PAIN: 0
CHILLS: 0
SHORTNESS OF BREATH: 0
MYALGIAS: 0
FEVER: 0
NECK PAIN: 0
VOMITING: 0
DYSPHORIC MOOD: 1
COUGH: 0
HALLUCINATIONS: 0
FLANK PAIN: 0

## 2021-05-06 ASSESSMENT — MIFFLIN-ST. JEOR: SCORE: 1240.73

## 2021-05-06 NOTE — ED TRIAGE NOTES
"Pt sitting in front of library. Phi called 911 after interacting with pt. Pt seen at [facility] ER early this morning and [facility] yesterday. Pt told EMS \"I just need a break from life\". Pt is homeless.  "

## 2021-05-06 NOTE — ED PROVIDER NOTES
"  History   Chief Complaint:  Psychiatric Evaluation       The history is provided by the patient, the EMS personnel and medical records.      Ravindra Oro is a 26 year old male with history of schizoaffective disorder and homelessness who presents via EMS for psychiatric evaluation. Ravindra was seen in the [facility] earlier today and the [facility] ED yesterday. He was brought in by EMS after a bystander found him sitting outside a library and called 911. In the ED, he endorses depression and suicidal ideation, stating he would jump off a bridge. No homicidal ideation or hallucinations. He is not working right now and does not have a place to stay. He is comfortable with transfer to [facility]. No fever, chills, cough, or covid concern. No vomiting or pain. No drug or alcohol use.     Review of Systems   Constitutional: Negative for chills and fever.   Respiratory: Negative for cough.    Cardiovascular: Negative for chest pain.   Gastrointestinal: Negative for abdominal pain and vomiting.   Genitourinary: Negative for flank pain.   Musculoskeletal: Negative for arthralgias, myalgias and neck pain.   Psychiatric/Behavioral: Positive for suicidal ideas. Negative for hallucinations.   All other systems reviewed and are negative.        Allergies:  No Known Allergies    Medications:  Cholecalciferol   Cyanocobalamin   Fluoxetine   Mirtazapine   Olanzapine     Past Medical History:     Depression  Homelessness   Psychosis  PTSD  Schizoaffective disorder  Suicidal ideation     Social History:  Presents unaccompanied via EMS  PCP: MARTINA Matta    Physical Exam     Patient Vitals for the past 24 hrs:   BP Temp Temp src Pulse Resp SpO2 Height Weight   05/06/21 1655 -- -- -- -- -- -- -- 57.7 kg (127 lb 3.2 oz)   05/06/21 1649 111/79 97.9  F (36.6  C) Oral 68 16 100 % 1.27 m (4' 2\") --   05/06/21 1647 111/76 97.9  F (36.6  C) Oral 68 16 100 % 1.27 m (4' 2\") --   05/06/21 1613 113/65 -- -- 60 18 99 % -- --   05/06/21 1330 " "130/64 98.3  F (36.8  C) Oral 77 16 97 % -- --       Physical Exam  Constitutional: Laying in bed comfortably.  Head: Head moves freely with normal range of motion.   ENT: Oropharynx is clear and moist.   Eyes: Conjunctivae pink. EOMs intact.   Neck: Normal range of motion.   Cardiovascular: Regular rate and rhythm. Normal heart sounds. No concerning murmur. Intact distal pulses: radial pulses 2+ on the right, 2+ on the left.   Pulmonary/Chest: No respiratory distress. No decreased breath sounds. No wheezes. No rhonchi. No rales. Lungs clear throughout.   Abdominal: Soft. Non-tender. No rebound, no guarding.   Musculoskeletal: No peripheral edema. Distal capillary refill and sensation intact.   Neurological: Oriented to person, place, and time. No focal deficits.   Psych: Denies auditory or visual hallucinations. Notes depression and suicidal thoughts and states \"maybe I would jump into the [facility]\". Difficulty maintaining a linear goal directed conversation.   Skin: Skin is warm and normal in color. No rash noted.      Emergency Department Course     Laboratory:  Asymptomatic COVID19 Virus PCR by nasopharyngeal swab: Negative    Drug abuse screen 77 urine: all Negative    Emergency Department Course:    Reviewed:  I reviewed nursing notes, vitals, past medical history and care everywhere    Assessments:  1427 I obtained history and examined the patient as noted above.     Disposition:  The patient was transferred to [facility].       Impression & Plan     Medical Decision Making:  Ravindra Oro is a 26 year old male who presents for evaluation of depression and suicidal thoughts.  The patient has a history of previous psychiatric illness and is homeless. There are no concerns for or signs at this point of suicide attempt or ingestion, no concerning findings on the remainder of physical exam. Upon chart review he has been to [facility] ER for various complaints including depression and chest pain on 4/30, 5/1 and " 5/5. COVID negative and Urine tox with no evidence of substance use. The patient will be transferred to the [facility] unit for further psychiatric evaluation.       Covid-19  Ravindra Oro was evaluated during a global COVID-19 pandemic, which necessitated consideration that the patient might be at risk for infection with the SARS-CoV-2 virus that causes COVID-19.   Applicable protocols for evaluation were followed during the patient's care.   COVID-19 was considered as part of the patient's evaluation. The plan for testing is:  a test was obtained during this visit.    Diagnosis:    ICD-10-CM    1. Depression  F32.9    2. Suicidal thoughts  R45.851    3. Homelessness  Z59.0          Scribe Disclosure:  I, Елена Blankenship, am serving as a scribe at 2:15 PM on 5/6/2021 to document services personally performed by Jeaneth Overton APRN CNP based on my observations and the provider's statements to me.          Jeaneth Overton APRN CNP  05/06/21 8764

## 2021-05-06 NOTE — PLAN OF CARE
"  Problem: Feelings of Worthlessness, Hopelessness or Excessive Guilt (Depressive Signs/Symptoms)  Goal: Enhanced Self-Esteem and Confidence (Depressive Signs/Symptoms)  Outcome: No Change     Problem: Mood Impairment (Depressive Signs/Symptoms)  Goal: Improved Mood Symptoms (Depressive Signs/Symptoms)  Outcome: No Change    26 years old male received from ED due to worsening depression. Pt was near a downtown library after going to [facility] [place] ED earlier this morning making statements that he was \"going to jump off a bridge\" due to feeling hopeless about living and working situations. Pt is currently homeless and reports, \"I have been jumping around place to place and some olmstead\". He also reports, \"I had a job interview yesterday and they did not give it to me on the spot, but I'm suppose to find out tomorrow whether I got it or not\". Pt acknowledges that he needs this job and is stressed to find out the outcome. Since coming to [facility] patient has denied SI/HI and is not endorsing hallucinations. Previous MH history includes schizoaffective disorder. Patient presents with flat, blunted affect and depressed in mood. U-tox screen came back negative.     Nursing and risk assessments completed. Assessments reviewed with LMHP and physician. Video monitoring in progress, patient informed.  Admission information reviewed with patient. Patient given a tour of [facility] and instructions on using the facility. Questions regarding [facility] addressed. Pt search completed and belongings inventoried.   "

## 2021-05-06 NOTE — ED NOTES
"When asked why pt is in the ER pt states he's depressed and \"life isn't interesting anymore\". Pt states he is homeless and sometimes stays at shelters. Pt has vague suicidal thoughts but no plan.  "

## 2021-05-07 PROCEDURE — G0378 HOSPITAL OBSERVATION PER HR: HCPCS

## 2021-05-07 NOTE — PROGRESS NOTES
Patient pleasant and cooperative. Patient is agreeable to discharge plan. Discharge instructions reviewed with patient including follow-up care plan. Reviewed outpatient resources. Denies SI and HI. [facility] to [facility] in [place] has been contacted. Awaiting for ride arrival.

## 2021-05-07 NOTE — ED PROVIDER NOTES
"[facility] - EmPATH Unit  Observation Initiation Date: May 6, 2021    Ravindra Oro MRN: [id number]   Age: 26 year old YOB: 1995     History     Chief Complaint   Patient presents with     Psychiatric Evaluation     26-year-old man here for worsening depression. Patient was living in [place] with section 8 housing. He was doing well there. He couldn't get a job because his green card . He didn't like not working, so he came back here for work. 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 He feels unsafe in shelters and hasn't maintained medications due to his living situation. Patient often visits multiple EDs in same day. Patient agreeable to crisis placement.    The history is provided by the patient and medical records. No  was used.              Review of Systems   Constitutional: Negative for fever.   Respiratory: Negative for shortness of breath.    Cardiovascular: Negative for chest pain.   Gastrointestinal: Negative for abdominal pain.   Psychiatric/Behavioral: Positive for dysphoric mood.   All other systems reviewed and are negative.        Physical Examination   BP: 130/64  Pulse: 77  Temp: 98.3  F (36.8  C)  Resp: 16  Height: 127 cm (4' 2\")  Weight: (declined to get weight)  SpO2: 97 %    Physical Exam  Vitals signs and nursing note reviewed.   Constitutional:       Appearance: Normal appearance. He is normal weight.   HENT:      Head: Normocephalic.      Mouth/Throat:      Mouth: Mucous membranes are moist.   Eyes:      Extraocular Movements: Extraocular movements intact.   Neck:      Musculoskeletal: Normal range of motion.   Pulmonary:      Effort: Pulmonary effort is normal.   Musculoskeletal: Normal range of motion.   Skin:     General: Skin is dry.   Neurological:      General: No focal deficit present.      Mental Status: He is alert and oriented to person, place, and time.           Psychiatric " Examination   Appearance: awake, alert, adequately groomed and dressed in hospital scrubs  Attitude:  cooperative  Eye Contact:  good  Mood:  depressed  Affect:  mood congruent  Speech:  clear, coherent and normal prosody  Psychomotor Behavior:  no evidence of tardive dyskinesia, dystonia, or tics and intact station, gait and muscle tone  Throught Process:  goal oriented  Associations:  no loose associations  Thought Content:  no evidence of suicidal ideation or homicidal ideation and no evidence of psychotic thought  Insight:  fair  Judgement:  fair  Oriented to:  time, person, and place  Attention Span and Concentration:  intact  Recent and Remote Memory:  intact    ED Course        Labs Ordered and Resulted from Time of ED Arrival Up to the Time of Departure from the ED   SARS-COV-2 (COVID-19) VIRUS RT-PCR   DRUG ABUSE SCREEN 77 URINE (FL, RH, SH)       Assessments & Plan (with Medical Decision Making)   Patient presenting with worsening depression. He has been presenting to other area EDs almost daily and sometimes more than one ED in a day. He is not able to maintain stability at this time and would benefit from crisis placement. Will place on observation while seeking crisis placement. Nursing notes reviewed.     I have reviewed the DEC assessment complete by Lenard Mckenna dated 5/6/2021.        The patient was found to have a psychiatric condition that would benefit from an observation stay in the emergency department for further psychiatric stabilization and/or coordination of a safe disposition. The observation plan includes serial assessments of psychiatric condition, potential administration of medications if indicated, further disposition pending the patient's psychiatric course during the monitoring period.     Preliminary diagnosis:  Major depressive disorder, recurrent, moderate    --  Darrion Franks MD   [facility] EMERGENCY DEPT  EmPATH Unit  5/6/2021        Darrion Franks  MD Minor  05/06/21 1949    Addendum: Crisis placement found. Patient discharging to [facility].       Darrion Franks MD  05/06/21 3723

## 2021-05-07 NOTE — DISCHARGE INSTRUCTIONS
[facility] Crisis Line Number: [phone]    Call your county worker to reconnect and let them know where you are at

## 2021-05-07 NOTE — CONSULTS
5/6/2021  Ravindra Oro 1995     [facility] Mental Health Assessment:    Started at: 1905  Completed at: 2056  What type of assessment are you doing today? Full DA    1.  Presenting Problem:      Referral Method to ED? Self and Medics     What brings the patient to the ED today? Patient came into the ED today via EMS after he asked a passerby to call 911 for him.  He endorsed depression to PD, EMS, and myself as reason for coming to the hospital.  This is patient's 3rd ED visit today within the [facility] area (other's included [facility]).  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      Has this happened before? Yes Patient was last at [facility] 4/9/2021 and seen by writer at that time too.  This is patient's 8th ED in May 2020.     Duration of presenting problem: reports worsening depression today but experiencing some level of depression throughout the past week.    Additional Stressors: homeless, inconsistent medication adherence, inconsistent employment, no family    2.  Risk Assessment:  Suicide and Self-Harm    ESS-6  1.a. Over the past 2 weeks, have you had thoughts of killing yourself? Yes   1.b. Have you ever attempted to kill yourself and, if yes, when did this last happen? No  2. Recent or current suicide plan? Yes  3. Recent or current intent to act on ideation? No  4. Lifetime psychiatric hospitalization? Yes  5. Pattern of excessive substance use? No  6. Current irritability, agitation, or aggression? No  ESS-6 Score: 3    SI: Passive  Plan: Yes jump off a bridge into the river  Intent: No   Prior Attempts: No     Protective Factors: heavily focused on getting a job and getting housing    Hopes and goals for the future: get a job and an apartment    Coping Skills: What helps and doesn't help? Find a safe place to stay, take medications, talk to trusted people    Additional Risk Factors Related to Safety and Suicide: No    Is the patient  engaged in self injurious behaviors? No     Risk to Others    Aggressive/Assaultive/Homicidal Risk Factors: No     Duty to Warn? No     Was a Child Protection Report Made? No       Was a Adult Protection Report Made? No        Sexually inappropriate behavior? No        Vulnerability to sexual exploitation? No     Additional information: n/a      3. Mental Health Symptoms and Substance Use  Current Symptoms and Mental Health History    GAIN Short Screener (GAIN-SS) administered? NA    Attention, Hyperactivity, and Impulsivity Symptoms      Patient reported symptoms related to hyperactivity, inattention, or impulsivity? No    Anxiety Symptoms    Patient reported anxiety symptoms? No       Behavioral Difficulties    Patient reported behavioral difficulties? No     Mood Symptoms    Patient reported mood disorder symptoms? Yes: Feelings of hopelessness , Low self esteem , Sad, depressed mood  and Thoughts of suicide/death    Patient reports feeling hopeless about life as he is not able to secure a job and has trouble finding housing; he endorses depressed mood with today being the worst of the week.  Patient appears to have lower self-esteem today than previous visit which could be related to difficulty finding employment.  Patient endorses passive SI with a plan to jump off the bridge yet does not have any intent to do so tonight.      Eating Disorders and Appetite Disturbance      Patient reported appetite symptoms? No     SCOFF  Do you make yourself sick (induce vomiting) because you feel uncomfortably full? n/a   Do you worry that you have lost Control over how much you eat? n/a  Have you recently lost more than 14 lb in a three-month period? n/a   Do you think you are too fat, even though others say you are too thin? n/a   Would you say that food dominates your life? n/a  SCOFF Score: n/a    Patient reported appetite or eating disorder symptoms? No    Interpersonal Functioning     Patient reported difficulties that  may be associated with personality and interpersonal functioning? No    Learning Disabilities/Cognitive/Developmental Disorders    Patient reported concerns related to learning disabilities, cognitive challenges, and/or developmental disorders? No     General Cognitive Impairments    Patient reported symptoms of cognitive impairments? No    Sleep Disturbance    Patient reported difficulties with sleep? Yes: Difficulty falling asleep    Patient reports not having slept much during the past three nights.  He identifies taking a short nap in a park yesterday.  Patient does appear be tired this evening during my assessment.      Psychosis Symptoms    Patient reported symptoms of psychosis? No    Patient does not report any psychosis symptoms to me but on the phone with [facility] he reported paranoia as a presenting symptom.  During our assessment earlier, patient did look toward the nurse's desk several times with an intense look yet denied any concerns about that behavior.     Trauma and Post-Traumatic Stress Disorder    Physical Abuse: No   Emotional/Psychological Abuse: No  Sexual Abuse: No  Loss of a friend or family member to suicide: No  Other Traumatic Event: No     Patient reported trauma related symptoms? No     Impact of Mental Health on Functioning      Negative Impact Score: 6/10   Subjective Impact on functioning: This appears to be baseline functioning for patient especially with this being his 3rd ED visit today and 8th visit this month.  Patient does not appear to be in distress, cooperative with assessment, and assertive with his wants/needs.   How do symptoms vary from baseline? This appears to be baseline.     Current and Historical Substance Use Note:    IIs there a history of, or current, substance use? No     Have you been to chemical dependency treatment or detox before? No     CAGE-AID    Have you felt you ought to cut down on your drinking or drug use? No     Have people annoyed you  by criticizing your drinking or drug use? No   Have you felt bad or guilty about your drinking or drug use? No  Have you ever had a drink or used drugs first thing in the morning to steady your nerves or to get rid of a hangover? No   CAGE-AID Score: 0/4    Drug screen completed? Yes ED completed UTOX and it was negative for everything.    BAL/Breathalyzer completed? No       Mental Status Exam:    Affect: Flat  Appearance: Disheveled   Attention Span/Concentration: Attentive    Eye Contact: Variable  Fund of Knowledge: Appropriate   Language /Speech Content: Fluent  Language /Speech Volume: Soft   Language /Speech Rate/Productions: Normal   Recent Memory: Intact  Remote Memory: Variable  Mood: Depressed and Sad   Orientation:   Person: Yes   Place: Yes  Time of Day: Yes   Date: Yes   Situation (Do they understand why they are here?): Yes   Psychomotor Behavior: Normal   Thought Content: Clear  Thought Form: Goal Directed and Intact    4. Social and Environmental Conditions   Is the patient their own guardian? Yes    Living Situation: Homeless, duration: for as long as he can remember    Support system and quality of connections: Patient does not identify anyone in his support network even after I asked about his providers listed below.     Income source: Employment: part time jobs through temp job agencies and Disability: SSI    Issues with employment or education: Yes Patient gets frustrated when temp agencies do not have a job for him, this leads to depression and passive SI.    Legal Concerns  Do you have any history of or current involvement with the legal system? No    Spiritual and Cultural Influences  Do you have any Presybeterian beliefs that are important in your life? No     Do you have any cultural influences in your life that impact your mental health care? No        5. Psychiatric History, Medical History, and Current Care      Patient Mental Health Services   Does the patient have a history of mental  health concerns/diagnoses? Yes Per EPIC records -  Schizoaffective disorder, depression, PTSD, MDD     Current Providers  Primary Care Provider: Yes [facility]   Psychiatrist: No   Therapist: No   : Yes but does not remember her name; states he has her phone number in his phone   [facility]: Yes also cannot recall name but has their number in his phone   [facility] Team: No   Other: No    History of Commitment? No  History of Psychiatric Hospitalizations? Yes Yes in 2020; several ED visits in 2021 already with 8 of them from May 1-6, 2021   History of programmatic care? No    Family Mental Health History   Family History of Mental Health or Chemical Dependency Issues? unknown     Development and Physical Health Challenges  Delays or concerns meeting developmental milestones? No  Current psychotropic medications? Yes Mirtazapine, Olanzapine, Fluoxetine   Medication Compliant? No: pateint reports taking medications inconsistently   Recent medication changes? No    History of concussion or TBI? No     Additional Information: n/a    6. Collateral Information and Collaboration    Collaboration with medical staff:Referral Information:   Medical Records, Psychiatry and Nursing     Collateral Information/Sources: Medical Records: EPIC records    7. Assessment and Diagnosis  Assessment of patient strengths and vulnerabilities    Strengths, Protective Factors, & Community Resources: motivated to work, open communication, connected with providers (housing worker at [facility], [facility], [facility] ).     Patient skills, abilities, and coping skills (what is going well?): independently accesses and utilizes EMS/ED/Services    Patient vulnerabilities: homelessness, often unemployed, non-citizen    Diagnosis  F33.1 major depression, recurrent, moderate    8.Therapeutic Methodologies Utilized in Assessment    Psychotherapy techniques and/or interventions used: Establishing rapport, Active listening,  Assess dimensions of crisis, Apply solution-focused therapy to address current crisis, Identify additional supports and alternative coping skills and Establish a discharge plan    9. Patient Care/Treatment Plan  Summary of Patient Presentation and needs  What are the basic needs for this patient in this moment? Secure bed at crisis residence      Consultations :  Attending provider consulted? Yes  Attending Name: Golden   Attending concurs with disposition? Yes     Recommended disposition: Other: [facility]     Does the patient agree with the recommended level of care? Yes    Final disposition: Other: [facility]    Disposition Details: patient will transfer to [facility]    If Inpatient, is patient admitted voluntary? N/A   Patient aware of potential for transfer if there is not appropriate placement? NA  Patient is willing to travel outside of the [facility] for placement? NA   Central Intake Notified? No    10. Patient Care Document: Safety and After Care Planning:          Safety Plan Provided? No    Follow-Up Plans and Providers: reconnect with Counts include [facility] beds at the [facility]  and [employer] [facility] worker    Follow-Up Plan:  After care plan provided to the patient/guardian by: yes  After care plan provided to any additional sources/parties? No    Duration of face to face time with patient in minutes: .50 hrs    CPT code(s) utilized: 20467 - Psychotherapy for Crisis - 60 (30-74*) min      JAGDISH Davis

## 2021-05-18 ENCOUNTER — HOSPITAL ENCOUNTER (EMERGENCY)
Facility: CLINIC | Age: 26
Discharge: HOME | End: 2021-05-18
Attending: EMERGENCY MEDICINE | Admitting: EMERGENCY MEDICINE
Payer: COMMERCIAL

## 2021-05-18 VITALS
RESPIRATION RATE: 16 BRPM | HEIGHT: 60 IN | DIASTOLIC BLOOD PRESSURE: 81 MMHG | OXYGEN SATURATION: 99 % | SYSTOLIC BLOOD PRESSURE: 125 MMHG | BODY MASS INDEX: 27.33 KG/M2 | TEMPERATURE: 98 F | WEIGHT: 139.2 LBS | HEART RATE: 69 BPM

## 2021-05-18 DIAGNOSIS — R45.851 SUICIDAL IDEATION: Primary | ICD-10-CM

## 2021-05-18 LAB
ALBUMIN SERPL-MCNC: 3.5 G/DL (ref 3.4–5)
ALBUMIN UR-MCNC: NEGATIVE MG/DL
ALP SERPL-CCNC: 65 U/L (ref 40–150)
ALT SERPL W P-5'-P-CCNC: 139 U/L (ref 0–70)
AMORPH CRY #/AREA URNS HPF: ABNORMAL /HPF
AMPHETAMINES UR QL SCN: NEGATIVE
ANION GAP SERPL CALCULATED.3IONS-SCNC: 4 MMOL/L (ref 3–14)
APPEARANCE UR: CLEAR
AST SERPL W P-5'-P-CCNC: 32 U/L (ref 0–45)
BARBITURATES UR QL: NEGATIVE
BASOPHILS # BLD AUTO: 0.1 10E9/L (ref 0–0.2)
BASOPHILS NFR BLD AUTO: 1 %
BENZODIAZ UR QL: NEGATIVE
BILIRUB SERPL-MCNC: 0.2 MG/DL (ref 0.2–1.3)
BILIRUB UR QL STRIP: NEGATIVE
BUN SERPL-MCNC: 19 MG/DL (ref 7–30)
CALCIUM SERPL-MCNC: 8.6 MG/DL (ref 8.5–10.1)
CANNABINOIDS UR QL SCN: NEGATIVE
CHLORIDE SERPL-SCNC: 112 MMOL/L (ref 94–109)
CO2 SERPL-SCNC: 25 MMOL/L (ref 20–32)
COCAINE UR QL: NEGATIVE
COLOR UR AUTO: ABNORMAL
CREAT SERPL-MCNC: 0.68 MG/DL (ref 0.66–1.25)
DIFFERENTIAL METHOD BLD: NORMAL
EOSINOPHIL # BLD AUTO: 0.4 10E9/L (ref 0–0.7)
EOSINOPHIL NFR BLD AUTO: 6.2 %
ERYTHROCYTE [DISTWIDTH] IN BLOOD BY AUTOMATED COUNT: 12.8 % (ref 10–15)
GFR SERPL CREATININE-BSD FRML MDRD: >90 ML/MIN/{1.73_M2}
GLUCOSE SERPL-MCNC: 112 MG/DL (ref 70–99)
GLUCOSE UR STRIP-MCNC: NEGATIVE MG/DL
HCT VFR BLD AUTO: 45 % (ref 40–53)
HGB BLD-MCNC: 14.9 G/DL (ref 13.3–17.7)
HGB UR QL STRIP: NEGATIVE
IMM GRANULOCYTES # BLD: 0.1 10E9/L (ref 0–0.4)
IMM GRANULOCYTES NFR BLD: 0.7 %
KETONES UR STRIP-MCNC: NEGATIVE MG/DL
LABORATORY COMMENT REPORT: NORMAL
LEUKOCYTE ESTERASE UR QL STRIP: NEGATIVE
LIPASE SERPL-CCNC: 108 U/L (ref 73–393)
LYMPHOCYTES # BLD AUTO: 1.8 10E9/L (ref 0.8–5.3)
LYMPHOCYTES NFR BLD AUTO: 25.9 %
MCH RBC QN AUTO: 29.2 PG (ref 26.5–33)
MCHC RBC AUTO-ENTMCNC: 33.1 G/DL (ref 31.5–36.5)
MCV RBC AUTO: 88 FL (ref 78–100)
MONOCYTES # BLD AUTO: 0.5 10E9/L (ref 0–1.3)
MONOCYTES NFR BLD AUTO: 7.4 %
NEUTROPHILS # BLD AUTO: 4.1 10E9/L (ref 1.6–8.3)
NEUTROPHILS NFR BLD AUTO: 58.8 %
NITRATE UR QL: NEGATIVE
NRBC # BLD AUTO: 0 10*3/UL
NRBC BLD AUTO-RTO: 0 /100
OPIATES UR QL SCN: NEGATIVE
PCP UR QL SCN: NEGATIVE
PH UR STRIP: 6.5 PH (ref 5–7)
PLATELET # BLD AUTO: 249 10E9/L (ref 150–450)
POTASSIUM SERPL-SCNC: 3.7 MMOL/L (ref 3.4–5.3)
PROT SERPL-MCNC: 6.5 G/DL (ref 6.8–8.8)
RBC # BLD AUTO: 5.11 10E12/L (ref 4.4–5.9)
RBC #/AREA URNS AUTO: 0 /HPF (ref 0–2)
SARS-COV-2 RNA RESP QL NAA+PROBE: NEGATIVE
SODIUM SERPL-SCNC: 141 MMOL/L (ref 133–144)
SOURCE: ABNORMAL
SP GR UR STRIP: 1.02 (ref 1–1.03)
SPECIMEN SOURCE: NORMAL
SQUAMOUS #/AREA URNS AUTO: 0 /HPF (ref 0–1)
UROBILINOGEN UR STRIP-MCNC: 0 MG/DL (ref 0–2)
WBC # BLD AUTO: 6.9 10E9/L (ref 4–11)
WBC #/AREA URNS AUTO: 1 /HPF (ref 0–5)

## 2021-05-18 PROCEDURE — 90791 PSYCH DIAGNOSTIC EVALUATION: CPT

## 2021-05-18 PROCEDURE — 99285 EMERGENCY DEPT VISIT HI MDM: CPT | Mod: 25

## 2021-05-18 PROCEDURE — 81001 URINALYSIS AUTO W/SCOPE: CPT | Performed by: EMERGENCY MEDICINE

## 2021-05-18 PROCEDURE — 85025 COMPLETE CBC W/AUTO DIFF WBC: CPT | Performed by: EMERGENCY MEDICINE

## 2021-05-18 PROCEDURE — 87635 SARS-COV-2 COVID-19 AMP PRB: CPT | Performed by: EMERGENCY MEDICINE

## 2021-05-18 PROCEDURE — 83690 ASSAY OF LIPASE: CPT | Performed by: EMERGENCY MEDICINE

## 2021-05-18 PROCEDURE — 80053 COMPREHEN METABOLIC PANEL: CPT | Performed by: EMERGENCY MEDICINE

## 2021-05-18 PROCEDURE — 99212 OFFICE O/P EST SF 10 MIN: CPT | Performed by: PSYCHIATRY & NEUROLOGY

## 2021-05-18 PROCEDURE — 80307 DRUG TEST PRSMV CHEM ANLYZR: CPT | Performed by: EMERGENCY MEDICINE

## 2021-05-18 ASSESSMENT — ENCOUNTER SYMPTOMS
SHORTNESS OF BREATH: 0
FEVER: 0
ABDOMINAL PAIN: 1
VOMITING: 0
DYSPHORIC MOOD: 1
NAUSEA: 0
DIARRHEA: 0
ABDOMINAL PAIN: 0

## 2021-05-18 ASSESSMENT — MIFFLIN-ST. JEOR: SCORE: 1215.79

## 2021-05-18 NOTE — ED TRIAGE NOTES
Patient arrives to the ED after being picked up in [facility] after reporting that he was suicidal. Patient is homeless and reports that he wants to end his life by doing a back flip off a bridge.

## 2021-05-18 NOTE — ED NOTES
"Patient appears to be comfortably resting in his bed. RN asked patient how he was doing and he said \"I'm real good.\"     "

## 2021-05-18 NOTE — ED PROVIDER NOTES
"  History   Chief Complaint:  Suicidal     The history is provided by the patient.      Ravindra Oro is a 26 year old male with history of depression, psychosis, SI, anxiety, and delusional disorder who presents with suicidal. Per chart review, the patient was seen at [facility] 12 days ago and frequents there ER. He reports that he wants to be in a mental health facility. He reports that he has some abdominal pain for awhile with no diarrhea or urinary issues. He was stating he was suicidal and wants to jump off a bridge. He denies taking his medications. He denies homo cidal ideations, or hallucinations, alcohol or drug use. He denies having his COVID vaccines.       Review of Systems   Gastrointestinal: Positive for abdominal pain. Negative for diarrhea, nausea and vomiting.   All other systems reviewed and are negative.        Allergies:  No known drug allergies     Medications:  Prozac  Remeron   Zyprexa    Past Medical History:    Depressive disorder   Psychosis   Schizoaffective disorder  Suicidal thoughts  Delusional disorder   Alopecia areata  Anxiety disorder     Past Surgical History:    The patient denies past surgical history.      Family History:    The patient denies past family history.     Social History:  Smoking status: never smoker  Alcohol use: no  Drug use: no  Presents to the ED alone   Patient is homeless.     Physical Exam     Patient Vitals for the past 24 hrs:   BP Temp Temp src Pulse Resp SpO2 Height Weight   05/18/21 1926 125/81 98  F (36.7  C) Oral 69 16 99 % 1.143 m (3' 9\") 63.1 kg (139 lb 3.2 oz)   05/18/21 1746 99/88 97.5  F (36.4  C) Oral 90 16 96 % -- --       Physical Exam  Constitutional: black male supine. No respiratory distress.   HENT: No signs of trauma.   Eyes: EOM are normal. Pupils are equal, round, and reactive to light.   Neck: Normal range of motion. No JVD present. No tracheal deviation present. No cervical adenopathy.  Cardiovascular: Regular rhythm.  Exam reveals " no gallop and no friction rub.    No murmur heard.  Pulmonary/Chest: Bilateral breath sounds normal. No wheezes, rhonchi or rales.  Abdominal: Soft. Mild diffused tenderness. No rebound or guarding.   Musculoskeletal: No edema. No tenderness.   Lymphadenopathy: No lymphadenopathy.   Neurological: Alert and oriented to person, place, and time. Normal strength. Coordination normal.   Skin: Skin is warm and dry. No rash noted. No erythema.    Psych: Admits suicidal thoughts, no overt psychosis, no hallucinations.     Emergency Department Course     Laboratory:  CBC: WBC 6.9, HGB 14.9,    CMP: Glucose 112, Chloride 112, Creatinine: 0.68, protein total 6.5,   Drug abuse screen 77 urine: negative   COVID-19 virus Swab PCR: negative   UA: Amorphous Crystals: few   Lipase: 108     Emergency Department Course:  Reviewed:  I reviewed the patient's nursing notes, vitals, past medical records, Care Everywhere.     Assessments:  1810 I performed an assessment and examination of the patient as noted above.      Disposition:  The patient was transferred to [facility].       Impression & Plan   Medical Decision Making:  Ravindra Oro is a 26 year old male who's suicidal thought was of jumping off of a bridge. He has some history of delusions and depression, but no history of significant drug use. He has been seen in the system several times most recently seen on the 6th of May, 12 days ago at [facility] by Dr. Coto. His only complaint to me is some abdominal discomfort which is chronic. I did do blood and urine which unremarkable, his tox screen is negative. The patient is eating, sitting up and saying thank you. There are no signs of hallucinations at this point. His COVID is negative and I think he is a good candidate for [facility].    Covid-19  Ravindra Oro was evaluated during a global COVID-19 pandemic, which necessitated consideration that the patient might be at risk for infection with the SARS-CoV-2  virus that causes COVID-19.   Applicable protocols for evaluation were followed during the patient's care.   COVID-19 was considered as part of the patient's evaluation. The plan for testing is:  a test was obtained during this visit.    Diagnosis:    ICD-10-CM    1. Suicidal ideation  R45.851        Scribe Disclosure:  Sandi KELLOGG, am serving as a scribe at 6:10 PM on 5/18/2021 to document services personally performed by Thiago Agosto MD based on my observations and the provider's statements to me.           Thiago Agosto MD  05/18/21 1140

## 2021-05-19 ENCOUNTER — HOSPITAL ENCOUNTER (EMERGENCY)
Facility: CLINIC | Age: 26
Discharge: HOME | End: 2021-05-19
Attending: EMERGENCY MEDICINE | Admitting: EMERGENCY MEDICINE
Payer: COMMERCIAL

## 2021-05-19 VITALS
HEART RATE: 85 BPM | RESPIRATION RATE: 16 BRPM | OXYGEN SATURATION: 99 % | SYSTOLIC BLOOD PRESSURE: 115 MMHG | TEMPERATURE: 98.7 F | DIASTOLIC BLOOD PRESSURE: 69 MMHG

## 2021-05-19 DIAGNOSIS — F33.1 MODERATE EPISODE OF RECURRENT MAJOR DEPRESSIVE DISORDER (H): ICD-10-CM

## 2021-05-19 DIAGNOSIS — F25.9 SCHIZOAFFECTIVE DISORDER, SUBCHRONIC CONDITION (H): ICD-10-CM

## 2021-05-19 DIAGNOSIS — R45.851 VERBALIZES SUICIDAL THOUGHTS: ICD-10-CM

## 2021-05-19 DIAGNOSIS — F33.9 RECURRENT MAJOR DEPRESSION (H): ICD-10-CM

## 2021-05-19 PROCEDURE — 99284 EMERGENCY DEPT VISIT MOD MDM: CPT | Performed by: EMERGENCY MEDICINE

## 2021-05-19 PROCEDURE — 250N000013 HC RX MED GY IP 250 OP 250 PS 637: Performed by: FAMILY MEDICINE

## 2021-05-19 PROCEDURE — 90791 PSYCH DIAGNOSTIC EVALUATION: CPT

## 2021-05-19 PROCEDURE — 99285 EMERGENCY DEPT VISIT HI MDM: CPT | Mod: 25

## 2021-05-19 RX ORDER — ACETAMINOPHEN 500 MG
1000 TABLET ORAL ONCE
Status: COMPLETED | OUTPATIENT
Start: 2021-05-19 | End: 2021-05-19

## 2021-05-19 RX ADMIN — ACETAMINOPHEN 1000 MG: 500 TABLET, FILM COATED ORAL at 09:16

## 2021-05-19 NOTE — ED NOTES
Bed: HW04  Expected date:   Expected time:   Means of arrival:   Comments:  Osiris [address] 25 y/o M depression

## 2021-05-19 NOTE — ED PROVIDER NOTES
"ED Psychiatric EmPATH Note  [facility] Emergency Department - EmPATH Unit    Ravindra Oro MRN: [id number]   Age: 26 year old YOB: 1995     History     Chief Complaint   Patient presents with     Suicidal     Patient is a 26 year old man who presents for suicidal thinking. He has been here several times over the last month. Last time I saw him, we helped him get into a crisis placement. He has been staying at crisis beds for most of the last few weeks. He told the  today that he wants to go to a shelter and then get in touch with a staffing agency at 9:30 tomorrow. When I saw the patient, he said the same. He is denying suicidal thoughts now. He is asking to leave. He wants to get to a shelter and then check on work in the morning. He is mildly disorganized, but very consistent in this report.    The history is provided by the patient and medical records. No  was used.          Review of Systems   Constitutional: Negative for fever.   Respiratory: Negative for shortness of breath.    Cardiovascular: Negative for chest pain.   Gastrointestinal: Negative for abdominal pain.   Psychiatric/Behavioral: Positive for dysphoric mood and suicidal ideas.   All other systems reviewed and are negative.        Physical Examination   BP: 99/88  Pulse: 90  Temp: 97.5  F (36.4  C)  Resp: 16  Height: 114.3 cm (3' 9\")  Weight: 63.1 kg (139 lb 3.2 oz)  SpO2: 96 %    Physical Exam  Vitals signs and nursing note reviewed.   Constitutional:       Appearance: Normal appearance.   HENT:      Head: Normocephalic and atraumatic.      Mouth/Throat:      Mouth: Mucous membranes are moist.   Eyes:      Pupils: Pupils are equal, round, and reactive to light.   Pulmonary:      Effort: Pulmonary effort is normal.   Musculoskeletal: Normal range of motion.   Skin:     General: Skin is dry.   Neurological:      General: No focal deficit present.      Mental Status: He is alert and oriented to person, place, " and time.           Psychiatric Examination   Appearance: awake, alert, adequately groomed and dressed in hospital scrubs  Attitude:  cooperative  Eye Contact:  good  Mood:  good  Affect:  intensity is blunted  Speech:  normal prosody  Psychomotor Behavior:  no evidence of tardive dyskinesia, dystonia, or tics and intact station, gait and muscle tone  Throught Process:  goal oriented  Associations:  no loose associations  Thought Content:  patient was endorsing hallucinations earlier, but now is denying this. Denies hallucinations currently.  Insight:  fair  Judgement:  fair  Oriented to:  time, person, and place  Attention Span and Concentration:  intact  Recent and Remote Memory:  intact    ED Course     ED Course as of May 18 2033   Tue May 18, 2021   1818 UA with Microscopic reflex to Culture       Labs Ordered and Resulted from Time of ED Arrival Up to the Time of Departure from the ED   COMPREHENSIVE METABOLIC PANEL - Abnormal; Notable for the following components:       Result Value    Chloride 112 (*)     Glucose 112 (*)     Protein Total 6.5 (*)      (*)     All other components within normal limits   ROUTINE UA WITH MICROSCOPIC REFLEX TO CULTURE - Abnormal; Notable for the following components:    Amorphous Crystals Few (*)     All other components within normal limits   SARS-COV-2 (COVID-19) VIRUS RT-PCR   DRUG ABUSE SCREEN 77 URINE (FL, RH, SH)   CBC WITH PLATELETS DIFFERENTIAL   LIPASE       Assessments & Plan (with Medical Decision Making)   Patient presenting with suicidal thoughts to jump off of a bridge. He has presented with this same concern multiple times here and at other [facility] hospitals over the last month. Often he presents to multiple hospitals in the same day. It always clears quickly and he does not act on these thoughts. At present, he is requesting to leave and denying suicidal thoughts. Given this pattern, I do not see elevated evidence of risk to self that would warrant an  involuntary hold. Patient is discharged per his request. Nursing notes reviewed.    I have reviewed the DEC assessment complete by Lenard Mckenna dated 5/18/2021.    Preliminary diagnosis:  Suicidal thoughts - now resolved  Schizoaffective disorder by history    --  Darrion Franks MD   [facility] EMERGENCY DEPT  EmPATH Unit  5/18/2021        Darrion Franks MD  05/18/21 4932

## 2021-05-19 NOTE — ED PROVIDER NOTES
Emergency Department Patient Sign-out       Brief HPI:  This is a 26 year old male signed out to me by Dr. Woods .  See initial ED Provider note for details of the presentation.          Patient is medically cleared for admission to a Behavioral Health unit.      The patient is on a hold.  The type of hold is ULYSSES.      The patient has not required medication for agitation.    Awaiting Behavioral Health Assessment (DEC)        Significant Events prior to my assuming care: 26-year-old with a history of delusional disorder, recurrent suicidal thoughts and homelessness, who was reportedly evaluated at [facility] yesterday reporting suicidal thoughts.  Scented last night stating that he needed medications, and denying thoughts of harming others or hallucinations but continues to report some suicidal thoughts.  He was medically cleared and placed in the queue for behavioral assessment by one of the mental health 's to assist with issues around disposition.  That assessment was pending at the time of signout.      Exam:   Patient Vitals for the past 24 hrs:   BP Temp Temp src Pulse Resp SpO2   05/19/21 0409 106/69 98.7  F (37.1  C) Oral 72 16 98 %     General: Patient is in no acute distress and is resting comfortably.  HEENT: Normocephalic atraumatic  Neck: Supple  Cardiovascular: Heart rate normal  Pulmonary: Patient is in no respiratory distress  Extremities: No signs of any significant or life-threatening trauma.  Neurologic: No new focal neurologic deficits.        ED RESULTS:   No results found for this visit on 05/19/21 (from the past 24 hour(s)).    ED MEDICATIONS:   Medications   acetaminophen (TYLENOL) tablet 1,000 mg (1,000 mg Oral Given 5/19/21 0916)     The patient was also seen by the [facility] , please refer to their extensive note/evaluation which was reviewed with me and is documented in EPIC on 5/19/2021 for further details.  He has a history of depression, psychosis,  "delusional disorder, anxiety, and frequent visits to the emergency department at various hospitals in the city.  Has been the last 2 weeks in and out of crisis residence placements.  Was at [facility] yesterday and evaluated at the [facility] unit by psychiatry.  At that time was able to contract for safety and placement at [facility] was arranged.  Today he was stating that he started feeling suicidal again, was specifically making statements about jumping off of a bridge.  Ultimately, he stated he was feeling better and may be interested in crisis residence placement.  We made numerous attempts to get him placed in a crisis residence, however while waiting for an available bed, the patient stated he was feeling better and \"I just want to get outside for a while\".  He stated he was no longer suicidal and appears to be at his baseline.  Based on the patient's previous diagnosis and behavior there is some increased risk for suicide, but he currently does not appear to represent an imminent risk as he does not appear psychotic, he is not intoxicated, and he is verbalizing safety at this time.  This appears to be consistent with his prior chronic behaviors and multiple emergency department visits.  He is not on an involuntary hold and I will allow him to be discharged and will provide community resources.    Impression:    ICD-10-CM    1. Verbalizes suicidal thoughts  R45.851    2. Moderate episode of recurrent major depressive disorder (H)  F33.1        Plan:    Pending studies include none.        MD Alejandro Hanna David, MD  05/19/21 1444    "

## 2021-05-19 NOTE — DISCHARGE INSTRUCTIONS
Return to the emergency department if you have concerns about your safety.  Please see community mental health resources below if needed:    *[facility] Crisis: COPE: ([phone]) 24 hour mobile crisis support for people having a mental health crisis in [facility].   *Acute Psychiatric Services ([phone]). 24-hour walk-in crisis psychiatric support at [facility]; Emergency Medications Clinic available 7:30am - 2:00pm  *C[facility]is [facility]: ([phone]) 24-hour confidential telephone counseling   *[facility] Emergency Room: [phone]  *[place] [facility] ([facility]) : [facility] connects people seeking recovery to resources that help foster and sustain long-term recovery. Whether you are seeking resources for treatment, transportation, housing, job training, education, health or other pathways to recovery, [facility] is a great place to start. [phone] [email or web address]

## 2021-05-19 NOTE — PROGRESS NOTES
Patient pleasant and cooperative. Patient understood and agreed to discharge plan. Discharge instructions reviewed with patient including follow-up care plan. Reviewed shelter options and other outpatient resources. Denies SI and HI. All belongings that were brought into the hospital have been returned to patient. Escorted off the unit at 2100 accompanied by [facility] staff. Discharged and left hospital by self. Patient was given bus token to get to desired destination.

## 2021-05-19 NOTE — PLAN OF CARE
26 year old male with history of depression and homelessness received from ED due to suicidal ideation. Patient has been seen on EmPATH a few times before. Reports feeling angry at life because a girl that he knows just informed him that she is pregnant with his child. Endorsed passive SI in the ED with a method of jumping off a bridge. However, does not intend to act on thoughts. Patient presents elated, but hopeless.    Nursing and risk assessments completed. Assessments reviewed with LMHP and physician. Video monitoring in progress, patient informed.  Admission information reviewed with patient. Patient given a tour of EmPATH and instructions on using the facility. Questions regarding EmPATH addressed. Pt search completed and belongings inventoried.

## 2021-05-19 NOTE — DISCHARGE INSTRUCTIONS
[facility] ([facility])  [address], [address]  [place] [address]  Phone: [phone]  Fax: [phone]  Email: [email or web address]  Office Hours: Monday-Friday, 7:30am-5:30pm  ________    [facility]  Homeless Outreach: [phone]    Adult Shelter Connect:  [phone]

## 2021-05-19 NOTE — CONSULTS
5/18/2021  Ravindra Oro 1995     [facility] Mental Health Assessment:    Started at: 1935  Completed at: 2057  What type of assessment are you doing today? Full DA    1.  Presenting Problem:      Referral Method to ED? Self     What brings the patient to the ED today? Patient brought into ED this evening  with complaints of suicidal thoughts with a plan to jump off a bridge [place] the [place], he is homeless, reports spending 10 of the past 12 nights in three different crisis residences.  He does not take medications, does not follow up with outpatient services, and has inconsistent reports of when he last spoke with [facility]  and when they are scheduled to meet next.  Patient is calm, cooperative, acknowledges that he needs housing to stay stable yet does not identify why he has not been following up with  at [facility].     Has this happened before? Yes This is patient's fourth ED visit this week and 13th ED visit this month all with the same complaint- passive SI, depression, anxiety, homelessness.     Duration of presenting problem: Chronic    Additional Stressors: homelessness, unemployed    2.  Risk Assessment:  Suicide and Self-Harm    ESS-6  1.a. Over the past 2 weeks, have you had thoughts of killing yourself? Yes   1.b. Have you ever attempted to kill yourself and, if yes, when did this last happen? No  2. Recent or current suicide plan? Yes  3. Recent or current intent to act on ideation? No  4. Lifetime psychiatric hospitalization? Yes  5. Pattern of excessive substance use? No  6. Current irritability, agitation, or aggression? No  ESS-6 Score: 3    SI: Passive  Plan: Yes jump of a bridge  Intent: No   Prior Attempts: No     Protective Factors: wants to work, wants to move back to [place]    Hopes and goals for the future: work, find housing    Coping Skills: What helps and doesn't help? Patient utilizes EDs often to address his immediate needs of food and  shelter. He does not engage in other healthy coping skills.     Additional Risk Factors Related to Safety and Suicide: No    Is the patient engaged in self injurious behaviors? No     Risk to Others    Aggressive/Assaultive/Homicidal Risk Factors: No     Duty to Warn? No     Was a Child Protection Report Made? No       Was a Adult Protection Report Made? No        Sexually inappropriate behavior? No        Vulnerability to sexual exploitation? No     Additional information: n/a      3. Mental Health Symptoms and Substance Use  Current Symptoms and Mental Health History    GAIN Short Screener (GAIN-SS) administered? NA    Attention, Hyperactivity, and Impulsivity Symptoms      Patient reported symptoms related to hyperactivity, inattention, or impulsivity? No      Anxiety Symptoms    Patient reported anxiety symptoms? Yes: Generalized Symptoms: Avoidance and Somatic symptoms - abdominal pain, headache, or tension     Patient reports that he experiences chest pain when he does try to stay at a shelter.  When provided education about this being a sign of anxiety, patient does not agree with this and expresses that people at the shelter might do something directly to him.     Behavioral Difficulties    Patient reported behavioral difficulties? No     Mood Symptoms    Patient reported mood disorder symptoms? Yes: Low self esteem , Sad, depressed mood  and Sleep disturbance    Patient endorses depression and not sleeping well.  He reports having been awake much of the past two days walking constantly but also later stated that he overslept for his appointment with the shelter worker who might be trying to help him with housing.     Eating Disorders and Appetite Disturbance      Patient reported appetite symptoms? No     SCOFF  Do you make yourself sick (induce vomiting) because you feel uncomfortably full? n/a   Do you worry that you have lost Control over how much you eat? n/a  Have you recently lost more than 14 lb in a  three-month period? n/a  Do you think you are too fat, even though others say you are too thin? n/a   Would you say that food dominates your life? n/a  SCOFF Score: n/a    Patient reported appetite or eating disorder symptoms? No    Interpersonal Functioning     Patient reported difficulties that may be associated with personality and interpersonal functioning? No    Learning Disabilities/Cognitive/Developmental Disorders    Patient reported concerns related to learning disabilities, cognitive challenges, and/or developmental disorders? No     General Cognitive Impairments    Patient reported symptoms of cognitive impairments? No  If yes, complete Mini-Cog Assessment.    Sleep Disturbance    Patient reported difficulties with sleep? Yes: Other: Patient reports that he does not feel safe sleeping at the shelter and would rather sleep in a park or on the streets. He reports having been awake much of the past two nights.      Psychosis Symptoms    Patient reported symptoms of psychosis? No      Trauma and Post-Traumatic Stress Disorder    Physical Abuse: No   Emotional/Psychological Abuse: No  Sexual Abuse: No  Loss of a friend or family member to suicide: No  Other Traumatic Event: No     Patient reported trauma related symptoms? No     Impact of Mental Health on Functioning      Negative Impact Score: 4/10   Subjective Impact on functioning: Patient does not appear to be too disrupted by this ED visit tonight.  When I addressed the need for housing and medications to build his foundation, he agreed yet did not engage in further discussion on how to start engaging in those services. Patient shares that he has stayed at [facility], [facility], and [facility] 10 of the past 12 nights which is when he was here last on [facility] unit.   How do symptoms vary from baseline? This appears to be patient's baseline.     Current and Historical Substance Use Note:    IIs there a history of, or current,  substance use? No     Have you been to chemical dependency treatment or detox before? No     CAGE-AID    Have you felt you ought to cut down on your drinking or drug use? No     Have people annoyed you by criticizing your drinking or drug use? No   Have you felt bad or guilty about your drinking or drug use? No  Have you ever had a drink or used drugs first thing in the morning to steady your nerves or to get rid of a hangover? No   CAGE-AID Score: 0/4    Drug screen completed? Yes negative for all screened substances   BAL/Breathalyzer completed? No       Mental Status Exam:    Affect: Appropriate  Appearance: Disheveled   Attention Span/Concentration: Attentive    Eye Contact: Engaged  Fund of Knowledge: Appropriate   Language /Speech Content: Fluent  Language /Speech Volume: Normal   Language /Speech Rate/Productions: Normal   Recent Memory: Intact  Remote Memory: Intact  Mood: Depressed   Orientation:   Person: Yes   Place: Yes  Time of Day: Yes   Date: Yes   Situation (Do they understand why they are here?): Yes   Psychomotor Behavior: Normal   Thought Content: Clear  Thought Form: Goal Directed and Intact    4. Social and Environmental Conditions   Is the patient their own guardian? Yes    Living Situation: Homeless, duration: as long as he can remember; utilizes Crisis Residences when needed    Support system and quality of connections: none    Income source: General Assistance: county benefits    Issues with employment or education: Yes Patient talks about trying to find work through temp agencies but has not had much success recently as the jobs are not on a busline.     Legal Concerns  Do you have any history of or current involvement with the legal system? No    Spiritual and Cultural Influences  Do you have any Jew beliefs that are important in your life? No     Do you have any cultural influences in your life that impact your mental health care? No        5. Psychiatric History, Medical History,  and Current Care      Patient Mental Health Services   Does the patient have a history of mental health concerns/diagnoses? Yes per EPIC- schizoaffective, depressioin, PTSD; per patient report- depression and anxiety     Current Providers  Primary Care Provider: Yes Ovi at [facility]   Psychiatrist: No   Therapist: No   : Yes does not recall her name but has her phone number   ARMHS: No   ACT Team: No   Other: Yes [employer]      History of Commitment? No  History of Psychiatric Hospitalizations? Yes in 2020; 13th ED visit this month   History of programmatic care? No    Family Mental Health History   Family History of Mental Health or Chemical Dependency Issues? No     Development and Physical Health Challenges  Delays or concerns meeting developmental milestones? No  Current psychotropic medications? Yes but does not take medications   Medication Compliant? No: does not take them for an undetermined reason   Recent medication changes? No    History of concussion or TBI? No     Additional Information: n/a    6. Collateral Information and Collaboration    Collaboration with medical staff:Referral Information:   Medical Records, Psychiatry and Nursing     Collateral Information/Sources: None available: homeless with no family contact    7. Assessment and Diagnosis  Assessment of patient strengths and vulnerabilities    Strengths, Protective Factors, & Community Resources: motivated to work, utilizes crisis residences as needed, communications well    Patient skills, abilities, and coping skills (what is going well?): knows how to access emergency and crisis resources, motivated to work    Patient vulnerabilities: homeless, wanting for his green card    Diagnosis   F33.1 Major Depression, recurrent, moderate    8.Therapeutic Methodologies Utilized in Assessment    Psychotherapy techniques and/or interventions used: Establishing rapport, Active listening, Assess dimensions of crisis, Apply  solution-focused therapy to address current crisis, Identify additional supports and alternative coping skills, Establish a discharge plan, Motivational Interviewing, Brief Supportive Therapy and Safety planning    9. Patient Care/Treatment Plan  Summary of Patient Presentation and needs  What are the basic needs for this patient in this moment? Education on crisis management,  to work with established providers for housing and work      Consultations :  Attending provider consulted? Yes  Attending Name: Golden   Attending concurs with disposition? Yes     Recommended disposition: Other: discharge with instructions to call [facility] at 2130 to secure a male bed     Does the patient agree with the recommended level of care? Yes    Final disposition: Other: discharge to homeless shelter with instructions to call [facility] at 2130 to secure a male bed for tonight; call case workers daily to check in and let them know what he needs that day    Disposition Details: Patient is agreeable to discharging to the homeless shelter [facility] as he has utilized all crisis residences at least once in the past 12 days.  He understands that being in contact more frequently with his case workers will help him find housing and get off the streets.     If Inpatient, is patient admitted voluntary? N/A   Patient aware of potential for transfer if there is not appropriate placement? NA  Patient is willing to travel outside of the [facility] for placement? NA   Central Intake Notified? No    10. Patient Care Document: Safety and After Care Planning:          Safety Plan Provided? Yes:   If I am feeling unsafe or I am in a crisis, I will:  Contact my established care providers  Call the National Suicide Prevention Lifeline: [phone]  Go to the nearest emergency room  Call 231    Warning signs that I or other people might notice when a crisis is developing for me: suicidal thoughts increase, go longer without sleep,  access EDs more frequently   Things I am able to do on my own to cope or help me feel better: find work, call my case workers, talk to [facility]  Things that I am able to do with others to cope or help me better: talk to others staying at the shelter, call crisis line  Things I can use or do for distraction: listen to music on my phone, go for a walk in a different park  Changes I can make to support my mental health and wellness: contact my case workers daily, stay at the shelter nightly, start taking medicaitons  People in my life that I can ask for help: case workers- [facility]; [place]   Your [facility] has a mental health crisis team you can call 24/7: [facility] Crisis Line Number: [phone]  Other things that are important when I m in crisis: n/a  Additional resources and information: Adult Shelter [facility], [facility] Homeless Outreach    Follow-Up Plans and Providers: case workers at [facility]    Follow-Up Plan:  After care plan provided to the patient/guardian by: DONNY Gandhi  After care plan provided to any additional sources/parties? No    Duration of face to face time with patient in minutes: .50 hrs    CPT code(s) utilized: 69960 - Psychotherapy for Crisis - 60 (30-74*) min      JAGDISH Davis

## 2021-05-19 NOTE — ED NOTES
Per  plan is either to find a crisis residence which will accept pt, otherwise inpatient psych admission may be necessary.

## 2021-05-19 NOTE — ED PROVIDER NOTES
ED Provider Note  [facility]      History     Chief Complaint   Patient presents with     Mental Health Problem     Wants to get out of [place], said the country is messing with his head     HPI  Ravindra Oro is a 26 year old male who has a past medical history of delusional disorder, suicidal thoughts, homelessness presenting with depression and thoughts of harming himself.  He presented yesterday to [facility] with thoughts of jumping off a bridge which she continues to endorse.  He feels safe while here in the emergency department.  Denies drug or alcohol abuse.  He is requesting medications, he is been on medications in the past but does not know what.  He has not had medications for 2 weeks.  Denies chest pain, shortness of breath or abdominal pain.  He did have labs at the outside hospital which were largely unremarkable.  He is denying thoughts of harming others, hallucinations or voices.    Past Medical History  Past Medical History:   Diagnosis Date     Depressive disorder      Psychosis (H)      Schizoaffective disorder (H)      History reviewed. No pertinent surgical history.  cholecalciferol (VITAMIN D) 1000 UNIT tablet  cyanocobalamin (VITAMIN B-12) 1000 MCG tablet  Fluocinolone Acetonide (DERMA-SMOOTHE/FS SCALP) 0.01 % OIL  FLUoxetine (PROZAC) 10 MG capsule  FLUoxetine (PROZAC) 20 MG capsule  ketoconazole (NIZORAL) 2 % shampoo  mirtazapine (REMERON) 30 MG tablet  OLANZapine (ZYPREXA) 5 MG tablet      No Known Allergies  Family History  Family History   Problem Relation Age of Onset     Cancer No family hx of         No family history of skin cancer     Melanoma No family hx of      Skin Cancer No family hx of      Social History   Social History     Tobacco Use     Smoking status: Never Smoker     Smokeless tobacco: Never Used   Substance Use Topics     Alcohol use: None     Drug use: None      Past medical history, past surgical history, medications, allergies,  family history, and social history were reviewed with the patient. No additional pertinent items.       Review of Systems  A complete review of systems was performed with pertinent positives and negatives noted in the HPI, and all other systems negative.    Physical Exam   BP: 106/69  Pulse: 72  Temp: 98.7  F (37.1  C)  Resp: 16  SpO2: 98 %  Physical Exam  Physical Exam   Constitutional: oriented to person, place, and time. appears well-developed and well-nourished.   HENT:   Head: Normocephalic and atraumatic.   Neck: Normal range of motion.   Pulmonary/Chest: Effort normal. No respiratory distress.   Cardiac: No murmurs, rubs, gallops. RRR.  Abdominal: Abdomen soft, nontender, nondistended. No rebound tenderness.  MSK: Long bones without deformity or evidence of trauma  Neurological: alert and oriented to person, place, and time.   Skin: Skin is warm and dry.   Psychiatric:  normal mood and affect.  behavior is normal. Thought content normal.     ED Course      Procedures        No results found for any visits on 05/19/21.  Medications - No data to display     Assessments & Plan (with Medical Decision Making)   MDM  Patient presenting suicidal ideation.  He continues to endorse a plan of jumping off a bridge.  We will have an  see the patient.  He does have a history of multiple visits to multiple emergency departments however he does have the same plan as he had last night.    I have reviewed the nursing notes. I have reviewed the findings, diagnosis, plan and need for follow up with the patient.    New Prescriptions    No medications on file       Final diagnoses:   Verbalizes suicidal thoughts       --  Justino Woods  [facility] EMERGENCY DEPARTMENT  5/19/2021     Justino Woods MD  05/19/21 4212

## 2021-05-21 ENCOUNTER — HOSPITAL ENCOUNTER (EMERGENCY)
Facility: CLINIC | Age: 26
Discharge: NURSING, REHAB OR LONG-TERM CARE | End: 2021-05-21
Attending: FAMILY MEDICINE | Admitting: FAMILY MEDICINE
Payer: COMMERCIAL

## 2021-05-21 VITALS
HEART RATE: 64 BPM | DIASTOLIC BLOOD PRESSURE: 80 MMHG | SYSTOLIC BLOOD PRESSURE: 126 MMHG | RESPIRATION RATE: 18 BRPM | OXYGEN SATURATION: 98 % | TEMPERATURE: 97.5 F

## 2021-05-21 DIAGNOSIS — F41.9 ANXIETY: ICD-10-CM

## 2021-05-21 DIAGNOSIS — F33.0 MILD EPISODE OF RECURRENT MAJOR DEPRESSIVE DISORDER (H): ICD-10-CM

## 2021-05-21 PROCEDURE — 99285 EMERGENCY DEPT VISIT HI MDM: CPT | Mod: 25 | Performed by: FAMILY MEDICINE

## 2021-05-21 PROCEDURE — 99284 EMERGENCY DEPT VISIT MOD MDM: CPT | Performed by: FAMILY MEDICINE

## 2021-05-21 PROCEDURE — 90791 PSYCH DIAGNOSTIC EVALUATION: CPT

## 2021-05-21 RX ORDER — OLANZAPINE 5 MG/1
5 TABLET ORAL AT BEDTIME
Qty: 10 TABLET | Refills: 0 | Status: SHIPPED | OUTPATIENT
Start: 2021-05-21

## 2021-05-21 RX ORDER — OLANZAPINE 5 MG/1
5 TABLET, ORALLY DISINTEGRATING ORAL AT BEDTIME
Qty: 10 TABLET | Refills: 0 | Status: SHIPPED | OUTPATIENT
Start: 2021-05-21

## 2021-05-21 NOTE — ED PROVIDER NOTES
"ED Provider Note  [facility]      History     Chief Complaint   Patient presents with     Paranoid     Wanted to leave the country, reports feeling worried about being attacked.      HPI  Ravindra Oro is a 26 year old male who 26-year-old Iraqi male who carries a previous diagnosis of major depression, anxiety, possible PTSD, delusional disorder, malingering, and homelessness. He is well-known to multiple emergency departments, and frequently seeks temporary shelter and respite in the emergency department when he is unable to access in the community. Today he states he feels that he needs to get back to [facility] because this country has nothing to offer for him. States he has been here for 10 years and \"I am not getting anywhere\". He states he was sleeping in [facility] but felt scared that he might be attacked. Was recently in the ED here, seeking admission to a crisis residence, but apparently was [place] as he had already used his allotment of days there.    Past Medical History  Past Medical History:   Diagnosis Date     Depressive disorder      Psychosis (H)      Schizoaffective disorder (H)      History reviewed. No pertinent surgical history.  OLANZapine (ZYPREXA) 5 MG tablet  OLANZapine zydis (ZYPREXA) 5 MG ODT  cholecalciferol (VITAMIN D) 1000 UNIT tablet  cyanocobalamin (VITAMIN B-12) 1000 MCG tablet  Fluocinolone Acetonide (DERMA-SMOOTHE/FS SCALP) 0.01 % OIL  FLUoxetine (PROZAC) 10 MG capsule  FLUoxetine (PROZAC) 20 MG capsule  ketoconazole (NIZORAL) 2 % shampoo  mirtazapine (REMERON) 30 MG tablet      No Known Allergies  Family History  Family History   Problem Relation Age of Onset     Cancer No family hx of         No family history of skin cancer     Melanoma No family hx of      Skin Cancer No family hx of      Social History   Social History     Tobacco Use     Smoking status: Never Smoker     Smokeless tobacco: Never Used   Substance Use " Topics     Alcohol use: None     Drug use: None      Past medical history, past surgical history, medications, allergies, family history, and social history were reviewed with the patient. No additional pertinent items.       Review of Systems  A complete review of systems was performed with pertinent positives and negatives noted in the HPI, and all other systems negative.    Physical Exam   BP: 116/62  Pulse: 79  Temp: 96.8  F (36  C)  Resp: 18  SpO2: 96 %  Physical Exam  Vitals signs and nursing note reviewed.   Constitutional:       General: He is not in acute distress.     Appearance: He is not diaphoretic.   HENT:      Head: Atraumatic.   Eyes:      General: No scleral icterus.     Pupils: Pupils are equal, round, and reactive to light.   Cardiovascular:      Heart sounds: Normal heart sounds.   Pulmonary:      Effort: No respiratory distress.      Breath sounds: Normal breath sounds.   Abdominal:      General: Bowel sounds are normal.      Palpations: Abdomen is soft.      Tenderness: There is no abdominal tenderness.   Musculoskeletal:         General: No tenderness.   Skin:     General: Skin is warm.      Findings: No rash.   Psychiatric:         Attention and Perception: Attention normal.         Mood and Affect: Mood is anxious and depressed.         Speech: Speech normal.         Behavior: Behavior normal.         ED Course      Procedures        The medical record was reviewed and interpreted.  Managed outpatient prescription medications.       No results found for any visits on 05/21/21.  Medications - No data to display     Assessments & Plan (with Medical Decision Making)   A 26-year-old male who has an extensive history of emergency department visits, depression, anxiety, delusional disorder and possible PTSD, presenting again reporting depressive symptoms and hopelessness.  Here in the ED on exam he is not intoxicated his vital signs are normal he appears in no distress and has an unremarkable  physical exam.  He is depressed appearing, but I see no signs of responding to internal stimuli.  He denies thoughts of harm to self or others and not appear to represent an imminent risk of harm.  Once again it appears the patient is using the emergency department as a means to gain temporary shelter, and obtain assistance with crisis residence placement.  The patient was also seen by the [facility] , please refer to their extensive note/evaluation which was reviewed with me and is documented in EPIC on 5/21/2021 for further details.  We were able to locate him a bed at [facility].  He requested 10-day supply of his medications.  The patient tells me his most recent medication is Zyprexa 5 mg at bedtime and so he was given a 10-day supply.  He appears stable for discharge to the crisis residence.    I have reviewed the nursing notes. I have reviewed the findings, diagnosis, plan and need for follow up with the patient.    New Prescriptions    OLANZAPINE ZYDIS (ZYPREXA) 5 MG ODT    Take 1 tablet (5 mg) by mouth At Bedtime       Final diagnoses:   Mild episode of recurrent major depressive disorder (H)   Anxiety       --  Ellis Allen MD  [facility] EMERGENCY DEPARTMENT  5/21/2021     Ellis Allen MD  05/21/21 [id number]

## 2021-05-21 NOTE — DISCHARGE INSTRUCTIONS
Go now to [facility], [address] [place] [address].  Restart Zyprexa.  Please follow-up with your regular outpatient mental health providers.    *[facility]: COPE: ([phone]) 24 hour mobile crisis support for people having a mental health crisis in [facility].   *Acute Psychiatric Services ([phone]). 24-hour walk-in crisis psychiatric support at [facility]; Emergency [facility] available 7:30am - 2:00pm  *[facility]: ([phone]) 24-hour confidential telephone counseling   *[facility] Emergency Room: [phone]  *[place] [facility] ([facility]) : [facility] connects people seeking recovery to resources that help foster and sustain long-term recovery. Whether you are seeking resources for treatment, transportation, housing, job training, education, health or other pathways to recovery, [facility] is a great place to start. [phone] [email or web address]

## 2021-05-21 NOTE — ED NOTES
Bed: HW06  Expected date: 5/21/21  Expected time:   Means of arrival: Ambulance  Comments:  H429 24 yo M Mental Health Eval

## 2021-05-26 ENCOUNTER — HOSPITAL ENCOUNTER (EMERGENCY)
Facility: CLINIC | Age: 26
Discharge: HOME | End: 2021-05-26
Attending: EMERGENCY MEDICINE | Admitting: EMERGENCY MEDICINE
Payer: COMMERCIAL

## 2021-05-26 ENCOUNTER — HOSPITAL ENCOUNTER (EMERGENCY)
Facility: CLINIC | Age: 26
Discharge: HOME | End: 2021-05-26
Attending: PSYCHIATRY & NEUROLOGY
Payer: COMMERCIAL

## 2021-05-26 VITALS
HEART RATE: 73 BPM | OXYGEN SATURATION: 96 % | DIASTOLIC BLOOD PRESSURE: 83 MMHG | RESPIRATION RATE: 16 BRPM | SYSTOLIC BLOOD PRESSURE: 114 MMHG | TEMPERATURE: 97 F

## 2021-05-26 VITALS
SYSTOLIC BLOOD PRESSURE: 102 MMHG | TEMPERATURE: 96.7 F | BODY MASS INDEX: 48.26 KG/M2 | HEART RATE: 69 BPM | RESPIRATION RATE: 16 BRPM | DIASTOLIC BLOOD PRESSURE: 57 MMHG | OXYGEN SATURATION: 95 % | WEIGHT: 139 LBS

## 2021-05-26 DIAGNOSIS — F43.10 PTSD (POST-TRAUMATIC STRESS DISORDER): ICD-10-CM

## 2021-05-26 DIAGNOSIS — Z86.59 HISTORY OF SCHIZOAFFECTIVE DISORDER: ICD-10-CM

## 2021-05-26 DIAGNOSIS — F32.A DEPRESSIVE DISORDER: ICD-10-CM

## 2021-05-26 DIAGNOSIS — Z59.00 HOMELESS: ICD-10-CM

## 2021-05-26 DIAGNOSIS — F41.1 GAD (GENERALIZED ANXIETY DISORDER): ICD-10-CM

## 2021-05-26 LAB
AMPHETAMINES UR QL SCN: NEGATIVE
AMPHETAMINES UR QL SCN: NEGATIVE
BARBITURATES UR QL: NEGATIVE
BARBITURATES UR QL: NEGATIVE
BENZODIAZ UR QL: NEGATIVE
BENZODIAZ UR QL: NEGATIVE
CANNABINOIDS UR QL SCN: NEGATIVE
CANNABINOIDS UR QL SCN: NEGATIVE
COCAINE UR QL: NEGATIVE
COCAINE UR QL: NEGATIVE
ETHANOL UR QL SCN: NEGATIVE
ETHANOL UR QL SCN: NEGATIVE
OPIATES UR QL SCN: NEGATIVE
OPIATES UR QL SCN: NEGATIVE

## 2021-05-26 PROCEDURE — 90791 PSYCH DIAGNOSTIC EVALUATION: CPT

## 2021-05-26 PROCEDURE — 99284 EMERGENCY DEPT VISIT MOD MDM: CPT | Performed by: EMERGENCY MEDICINE

## 2021-05-26 PROCEDURE — 80320 DRUG SCREEN QUANTALCOHOLS: CPT | Performed by: EMERGENCY MEDICINE

## 2021-05-26 PROCEDURE — 99207 PR APP CREDIT; MD BILLING SHARED VISIT: CPT | Performed by: PSYCHIATRY & NEUROLOGY

## 2021-05-26 PROCEDURE — 80307 DRUG TEST PRSMV CHEM ANLYZR: CPT | Performed by: EMERGENCY MEDICINE

## 2021-05-26 PROCEDURE — 250N000013 HC RX MED GY IP 250 OP 250 PS 637: Performed by: EMERGENCY MEDICINE

## 2021-05-26 PROCEDURE — 99285 EMERGENCY DEPT VISIT HI MDM: CPT | Mod: 25 | Performed by: EMERGENCY MEDICINE

## 2021-05-26 PROCEDURE — 99283 EMERGENCY DEPT VISIT LOW MDM: CPT | Mod: 25,27 | Performed by: PSYCHIATRY & NEUROLOGY

## 2021-05-26 PROCEDURE — 80320 DRUG SCREEN QUANTALCOHOLS: CPT | Performed by: FAMILY MEDICINE

## 2021-05-26 PROCEDURE — 80307 DRUG TEST PRSMV CHEM ANLYZR: CPT | Performed by: FAMILY MEDICINE

## 2021-05-26 RX ORDER — ACETAMINOPHEN 500 MG
500 TABLET ORAL
COMMUNITY
Start: 2021-04-28

## 2021-05-26 RX ORDER — IBUPROFEN 200 MG
400 TABLET ORAL ONCE
Status: COMPLETED | OUTPATIENT
Start: 2021-05-26 | End: 2021-05-26

## 2021-05-26 RX ORDER — IBUPROFEN 800 MG/1
800 TABLET, FILM COATED ORAL
COMMUNITY
Start: 2021-04-28

## 2021-05-26 RX ORDER — FLUTICASONE PROPIONATE 50 MCG
2 SPRAY, SUSPENSION (ML) NASAL
COMMUNITY
Start: 2021-04-28

## 2021-05-26 RX ADMIN — IBUPROFEN 400 MG: 200 TABLET, FILM COATED ORAL at 03:37

## 2021-05-26 SDOH — ECONOMIC STABILITY - HOUSING INSECURITY: HOMELESSNESS UNSPECIFIED: Z59.00

## 2021-05-26 SDOH — HEALTH STABILITY: MENTAL HEALTH: HOW OFTEN DO YOU HAVE A DRINK CONTAINING ALCOHOL?: NEVER

## 2021-05-26 ASSESSMENT — ENCOUNTER SYMPTOMS
EYES NEGATIVE: 1
DECREASED CONCENTRATION: 1
RESPIRATORY NEGATIVE: 1
HALLUCINATIONS: 0
HYPERACTIVE: 0
NEUROLOGICAL NEGATIVE: 1
CARDIOVASCULAR NEGATIVE: 1
GASTROINTESTINAL NEGATIVE: 1
NERVOUS/ANXIOUS: 1
CONSTITUTIONAL NEGATIVE: 1
HALLUCINATIONS: 0
MUSCULOSKELETAL NEGATIVE: 1

## 2021-05-26 NOTE — ED PROVIDER NOTES
ED Provider Note  [facility]      History     Chief Complaint   Patient presents with     Suicidal     Pt called 911 from outside [facility] where he was just seen and discharged.  Pt has been to several hospitals, with malingering.     JONATHON Oro is a 26 year old male who presents for mental health evaluation.  He states he has been having thoughts of hurting himself and feeling depressed.  He states he had been staying at [facility] for 2 days, but left there yesterday because he believed someone was trying to poison him.  He now states that he thinks they were helping him and he wants to go back there, but as he left there, they told him he could not come back.  He was seen at [facility] earlier for chest pain and did have an assessment EPS.  They recommended he go to a shelter.  He does state that he is having suicidal ideation with nearly daily thoughts of suicide, plan to kill himself and water.  He states that he is recently taking his meds for 2 days while at [facility].  Does not sound like he is taking his meds beyond that.  He denies any substance use.  He states that he either wants to kill himself, go back to [facility], or try to get a part-time job so he can buy plane ticket to go back to [facility] because it is not working out here.    Past Medical History  Past Medical History:   Diagnosis Date     Depressive disorder      Psychosis (H)      Schizoaffective disorder (H)      History reviewed. No pertinent surgical history.  cholecalciferol (VITAMIN D) 1000 UNIT tablet  cyanocobalamin (VITAMIN B-12) 1000 MCG tablet  Fluocinolone Acetonide (DERMA-SMOOTHE/FS SCALP) 0.01 % OIL  FLUoxetine (PROZAC) 10 MG capsule  FLUoxetine (PROZAC) 20 MG capsule  ketoconazole (NIZORAL) 2 % shampoo  mirtazapine (REMERON) 30 MG tablet  OLANZapine (ZYPREXA) 5 MG tablet  OLANZapine zydis (ZYPREXA) 5 MG ODT      No Known Allergies  Family History  Family  History   Problem Relation Age of Onset     Cancer No family hx of         No family history of skin cancer     Melanoma No family hx of      Skin Cancer No family hx of      Social History   Social History     Tobacco Use     Smoking status: Never Smoker     Smokeless tobacco: Never Used   Substance Use Topics     Alcohol use: Never     Frequency: Never     Drug use: Not Currently     Types: Marijuana      Past medical history, past surgical history, medications, allergies, family history, and social history were reviewed with the patient. No additional pertinent items.       Review of Systems  A complete review of systems was performed with pertinent positives and negatives noted in the HPI, and all other systems negative.    Physical Exam   BP: 114/84  Pulse: 66  Temp: 96.7  F (35.9  C)  Resp: 16  SpO2: 96 %  Physical Exam  Constitutional:       General: He is not in acute distress.     Appearance: He is not diaphoretic.   HENT:      Head: Atraumatic.   Eyes:      General: No scleral icterus.  Cardiovascular:      Heart sounds: Normal heart sounds.   Pulmonary:      Effort: No respiratory distress.      Breath sounds: Normal breath sounds.   Abdominal:      Palpations: Abdomen is soft.      Tenderness: There is no abdominal tenderness.   Musculoskeletal:         General: No deformity.   Skin:     General: Skin is warm.   Psychiatric:      Comments: Flat affect         ED Course      Procedures               No results found for any visits on 05/26/21.  Medications   ibuprofen (ADVIL/MOTRIN) tablet 400 mg (400 mg Oral Given 5/26/21 0337)        Assessments & Plan (with Medical Decision Making)   The patient denies any acute physical complaints.  He appears medically stable.  I will have crisis evaluate him.  He will be signed out at shift change pending DEC assessment.    Dictation Disclaimer: Some of this Note has been completed with voice-recognition dictation software. Although errors are generally corrected  real-time, there is the potential for a rare error to be present in the completed chart.      I have reviewed the nursing notes. I have reviewed the findings, diagnosis, plan and need for follow up with the patient.    New Prescriptions    No medications on file       Final diagnoses:   None       --  Portia Govea  [facility] EMERGENCY DEPARTMENT  5/26/2021     Portia Govea MD  05/28/21 0658

## 2021-05-26 NOTE — ED TRIAGE NOTES
Pt called 911 from outside [facility] where he was just seen and discharged.  Pt has been to several hospitals, with malingering.

## 2021-05-26 NOTE — ED PROVIDER NOTES
ED Provider Note  [facility]      History     Chief Complaint   Patient presents with     Suicidal     Pt called 911 from outside [facility] where he was just seen and discharged.  Pt has been to several hospitals, with malingering.     JONATHON Oro is a 26 year old male with previous diagnosis of major depression, anxiety, possible PTSD, delusional disorder, malingering, and homelessness. He is well-known to multiple emergency departments, and frequently seeks temporary shelter and respite in the emergency department when he is unable to access in the community.  He was staying at the [facility] for the past couple of nights.  They give him his meds.  Last nigth he started to feel paranoid with itchy eyes, and felt something was in the air.  He was worried he was being poisoned so left in the middle of the night. He denies hallucinations. He came here but would like to return to the shelter. No si/hi/hallucinations/sib.        Past Medical History  Past Medical History:   Diagnosis Date     Depressive disorder      Psychosis (H)      Schizoaffective disorder (H)      History reviewed. No pertinent surgical history.  cholecalciferol (VITAMIN D) 1000 UNIT tablet  cyanocobalamin (VITAMIN B-12) 1000 MCG tablet  Fluocinolone Acetonide (DERMA-SMOOTHE/FS SCALP) 0.01 % OIL  FLUoxetine (PROZAC) 10 MG capsule  FLUoxetine (PROZAC) 20 MG capsule  ketoconazole (NIZORAL) 2 % shampoo  mirtazapine (REMERON) 30 MG tablet  OLANZapine (ZYPREXA) 5 MG tablet  OLANZapine zydis (ZYPREXA) 5 MG ODT      No Known Allergies  Family History  Family History   Problem Relation Age of Onset     Cancer No family hx of         No family history of skin cancer     Melanoma No family hx of      Skin Cancer No family hx of      Social History   Social History     Tobacco Use     Smoking status: Never Smoker     Smokeless tobacco: Never Used   Substance Use Topics     Alcohol use: Never     Frequency:  Never     Drug use: Not Currently     Types: Marijuana      Past medical history, past surgical history, medications, allergies, family history, and social history were reviewed with the patient. No additional pertinent items.       Review of Systems   Psychiatric/Behavioral: Negative for hallucinations (paranoid), self-injury and suicidal ideas.   All other systems reviewed and are negative.    A complete review of systems was performed with pertinent positives and negatives noted in the HPI, and all other systems negative.    Physical Exam   BP: 114/84  Pulse: 66  Temp: 96.7  F (35.9  C)  Resp: 16  SpO2: 96 %  Physical Exam  Vitals signs and nursing note reviewed.   HENT:      Head: Normocephalic and atraumatic.      Nose: Nose normal.   Eyes:      Extraocular Movements: Extraocular movements intact.   Neck:      Musculoskeletal: Normal range of motion.   Cardiovascular:      Rate and Rhythm: Normal rate.   Pulmonary:      Effort: Pulmonary effort is normal.      Comments: No sob when speaking  Musculoskeletal: Normal range of motion.   Skin:     General: Skin is warm and dry.   Neurological:      General: No focal deficit present.      Mental Status: He is alert and oriented to person, place, and time.   Psychiatric:         Attention and Perception: Attention and perception normal.         Mood and Affect: Mood and affect normal.         Speech: Speech normal.         Behavior: Behavior normal. Behavior is cooperative.         Thought Content: Thought content is paranoid.         Cognition and Memory: Cognition and memory normal.         Judgment: Judgment normal.         ED Course      Procedures        The medical record was reviewed and interpreted.  Current labs reviewed and interpreted.       Results for orders placed or performed during the hospital encounter of 05/26/21   Drug abuse screen 6 urine (chem dep)     Status: None   Result Value Ref Range    Amphetamine Qual Urine Negative NEG^Negative     Barbiturates Qual Urine Negative NEG^Negative    Benzodiazepine Qual Urine Negative NEG^Negative    Cannabinoids Qual Urine Negative NEG^Negative    Cocaine Qual Urine Negative NEG^Negative    Ethanol Qual Urine Negative NEG^Negative    Opiates Qualitative Urine Negative NEG^Negative     Medications   ibuprofen (ADVIL/MOTRIN) tablet 400 mg (400 mg Oral Given 5/26/21 0337)        Assessments & Plan (with Medical Decision Making)   Ravindra Oro is a 26 year old male who carries a previous diagnosis of major depression, anxiety, possible PTSD, delusional disorder, malingering, and homelessness. He is well-known to multiple emergency departments, and frequently seeks temporary shelter and respite in the emergency department when he is unable to access in the community. He presents today stating he was feeling paranoid last night so left his shelter and came here. He is feeling better and would like to return to the [facility]. He called them to see if they would allow him to return. He says they give him his meds when there.   [facility] will not accept the patient back due to coming and going too much.  He would like to leave.  He was given APS at [place] and [facility] resources.      I have reviewed the nursing notes. I have reviewed the findings, diagnosis, plan and need for follow up with the patient.    New Prescriptions    No medications on file       Final diagnoses:   PTSD (post-traumatic stress disorder)   Depressive disorder   ESCOBAR (generalized anxiety disorder)       --  Carmel MATTHEWS [facility] EMERGENCY DEPARTMENT  5/26/2021     Carmel De Souza MD  05/26/21 1044

## 2021-05-26 NOTE — DISCHARGE INSTRUCTIONS
You can follow up with Acute Psychiatric services at [facility] if needed.      Shelter resources given.

## 2021-05-26 NOTE — ED NOTES
Bed: HW05  Expected date: 5/26/21  Expected time: 2:10 AM  Means of arrival:   Comments:  [place] [address] 27yo male SI

## 2021-05-27 NOTE — DISCHARGE INSTRUCTIONS
Please follow-up established care and services  Consider talking to the [place] [employer] about your request. Your current strategy will not work to get back home.

## 2021-05-27 NOTE — ED TRIAGE NOTES
Pt. called EMS today and stated he wanted to be deported and if he couldn't get deported he would jump off [address] bridge at sunset.

## 2021-05-27 NOTE — ED NOTES
PT arrived to [facility], started yelling and swearing at staff. Pt discharge per MD. Declined to sign paperwork.

## 2021-05-27 NOTE — ED PROVIDER NOTES
ED Provider Note  [facility]      History     Chief Complaint   Patient presents with     Suicidal     Will commit suicide if does not get deported     HPI  Ravindra Oro is a 26 year old male who is here via police as he called and told them that he wanted to be taken home (that being Beth,) otherwise he plans on jumping off a bridge. Patient is well known to area EDs due to his multiple visits. He tends to use the ED excessively to get his needs met. He was here earlier today and requested discharge back to the shelter. There were no threats at the time. Patient previously used the same threats as he gets frustrated with not getting the services and support that he feels he needs here. He does not feel the [place] is providing for him as he hoped. He did not mean to be brought here.     I discussed with patient that threatening suicide if he does not get deported back to \[facility]\"" is a poor strategy that will never work. I suggested contacting the [facility] embassy to discuss his request. He felt that was a better idea. He is open to being transported by cab back to the homeless shelter.    PERSONAL MEDICAL HISTORY  Past Medical History:   Diagnosis Date     Depressive disorder      Psychosis (H)      Schizoaffective disorder (H)      PAST SURGICAL HISTORY  History reviewed. No pertinent surgical history.  FAMILY HISTORY  Family History   Problem Relation Age of Onset     Cancer No family hx of         No family history of skin cancer     Melanoma No family hx of      Skin Cancer No family hx of      SOCIAL HISTORY  Social History     Tobacco Use     Smoking status: Never Smoker     Smokeless tobacco: Never Used   Substance Use Topics     Alcohol use: Never     Frequency: Never     MEDICATIONS  No current facility-administered medications for this encounter.      Current Outpatient Medications   Medication     acetaminophen (TYLENOL) 500 MG tablet     cholecalciferol (VITAMIN D) 1000  UNIT tablet     cyanocobalamin (VITAMIN B-12) 1000 MCG tablet     Fluocinolone Acetonide (DERMA-SMOOTHE/FS SCALP) 0.01 % OIL     FLUoxetine (PROZAC) 10 MG capsule     FLUoxetine (PROZAC) 20 MG capsule     fluticasone (FLONASE) 50 MCG/ACT nasal spray     ibuprofen (ADVIL/MOTRIN) 800 MG tablet     ketoconazole (NIZORAL) 2 % shampoo     mirtazapine (REMERON) 30 MG tablet     OLANZapine (ZYPREXA) 5 MG tablet     OLANZapine zydis (ZYPREXA) 5 MG ODT     ALLERGIES  No Known Allergies       Review of Systems   Constitutional: Negative.    HENT: Negative.    Eyes: Negative.    Respiratory: Negative.    Cardiovascular: Negative.    Gastrointestinal: Negative.    Genitourinary: Negative.    Musculoskeletal: Negative.    Skin: Negative.    Neurological: Negative.    Psychiatric/Behavioral: Positive for decreased concentration and suicidal ideas. Negative for hallucinations. The patient is nervous/anxious. The patient is not hyperactive.    All other systems reviewed and are negative.        Physical Exam   BP: 102/57  Pulse: 69  Temp: 96.7  F (35.9  C)  Resp: 16  Weight: 63 kg (139 lb)  SpO2: 95 %  Physical Exam  Vitals signs and nursing note reviewed.   HENT:      Head: Normocephalic.   Eyes:      Pupils: Pupils are equal, round, and reactive to light.   Neck:      Musculoskeletal: Normal range of motion.   Pulmonary:      Effort: Pulmonary effort is normal.   Musculoskeletal: Normal range of motion.   Neurological:      General: No focal deficit present.      Mental Status: He is alert.   Psychiatric:         Attention and Perception: Attention and perception normal. He does not perceive auditory or visual hallucinations.         Mood and Affect: Mood and affect normal.         Speech: Speech normal.         Behavior: Behavior normal. Behavior is not agitated, aggressive, hyperactive or combative. Behavior is cooperative.         Thought Content: Thought content normal. Thought content is not paranoid or delusional. Thought  content does not include homicidal or suicidal ideation.         Cognition and Memory: Cognition and memory normal.         Judgment: Judgment normal.         ED Course      Procedures             Results for orders placed or performed during the hospital encounter of 05/26/21   Drug abuse screen 6 urine (tox)     Status: None   Result Value Ref Range    Amphetamine Qual Urine Negative NEG^Negative    Barbiturates Qual Urine Negative NEG^Negative    Benzodiazepine Qual Urine Negative NEG^Negative    Cannabinoids Qual Urine Negative NEG^Negative    Cocaine Qual Urine Negative NEG^Negative    Ethanol Qual Urine Negative NEG^Negative    Opiates Qualitative Urine Negative NEG^Negative   Results for orders placed or performed during the hospital encounter of 05/26/21   Drug abuse screen 6 urine (chem dep)     Status: None   Result Value Ref Range    Amphetamine Qual Urine Negative NEG^Negative    Barbiturates Qual Urine Negative NEG^Negative    Benzodiazepine Qual Urine Negative NEG^Negative    Cannabinoids Qual Urine Negative NEG^Negative    Cocaine Qual Urine Negative NEG^Negative    Ethanol Qual Urine Negative NEG^Negative    Opiates Qualitative Urine Negative NEG^Negative     Medications - No data to display     Assessments & Plan (with Medical Decision Making)   Patient with history of schizoaffective disorder, PTSD and chronic homelessness who is here as he threatened to jump off a bridge if he does not get sent back to [facility]. Patient has made such threats previously. He is not acutely psychotic. He appears at baseline and exhibits secondary gain for temporary housing due to homelessness. Patient is willing to return to his shelter via cab. He can be discharged.    I have reviewed the nursing notes. I have reviewed the findings, diagnosis, plan and need for follow up with the patient.    New Prescriptions    No medications on file       Final diagnoses:   PTSD (post-traumatic stress disorder)   History of  schizoaffective disorder   Homeless       --  Jamarcus Martinez MD  [facility] EMERGENCY DEPARTMENT  5/26/2021     Jamarcus Martinez MD  05/26/21 2050

## 2021-05-27 NOTE — ED NOTES
Bed: HW01  Expected date: 5/26/21  Expected time: 7:20 PM  Means of arrival: Ambulance  Comments[place]  [phone][phone] [place]

## 2021-09-12 ENCOUNTER — HOSPITAL ENCOUNTER (EMERGENCY)
Facility: CLINIC | Age: 26
Discharge: HOME | End: 2021-09-12
Attending: FAMILY MEDICINE | Admitting: FAMILY MEDICINE
Payer: COMMERCIAL

## 2021-09-12 VITALS
OXYGEN SATURATION: 100 % | TEMPERATURE: 97.5 F | RESPIRATION RATE: 16 BRPM | SYSTOLIC BLOOD PRESSURE: 114 MMHG | DIASTOLIC BLOOD PRESSURE: 77 MMHG | HEART RATE: 69 BPM

## 2021-09-12 DIAGNOSIS — F22 PARANOIA (H): ICD-10-CM

## 2021-09-12 DIAGNOSIS — R07.9 CHEST PAIN, UNSPECIFIED TYPE: ICD-10-CM

## 2021-09-12 LAB
ALBUMIN SERPL-MCNC: 4.1 G/DL (ref 3.4–5)
ALBUMIN UR-MCNC: NEGATIVE MG/DL
ALP SERPL-CCNC: 40 U/L (ref 40–150)
ALT SERPL W P-5'-P-CCNC: 24 U/L (ref 0–70)
AMPHETAMINES UR QL SCN: NORMAL
ANION GAP SERPL CALCULATED.3IONS-SCNC: 7 MMOL/L (ref 3–14)
APPEARANCE UR: CLEAR
AST SERPL W P-5'-P-CCNC: 22 U/L (ref 0–45)
BACTERIA #/AREA URNS HPF: ABNORMAL /HPF
BARBITURATES UR QL: NORMAL
BASOPHILS # BLD AUTO: 0.1 10E3/UL (ref 0–0.2)
BASOPHILS NFR BLD AUTO: 1 %
BENZODIAZ UR QL: NORMAL
BILIRUB SERPL-MCNC: 0.6 MG/DL (ref 0.2–1.3)
BILIRUB UR QL STRIP: NEGATIVE
BUN SERPL-MCNC: 15 MG/DL (ref 7–30)
CALCIUM SERPL-MCNC: 8.4 MG/DL (ref 8.5–10.1)
CANNABINOIDS UR QL SCN: NORMAL
CHLORIDE BLD-SCNC: 106 MMOL/L (ref 94–109)
CO2 SERPL-SCNC: 26 MMOL/L (ref 20–32)
COCAINE UR QL: NORMAL
COLOR UR AUTO: ABNORMAL
CREAT SERPL-MCNC: 0.83 MG/DL (ref 0.66–1.25)
EOSINOPHIL # BLD AUTO: 0.3 10E3/UL (ref 0–0.7)
EOSINOPHIL NFR BLD AUTO: 4 %
ERYTHROCYTE [DISTWIDTH] IN BLOOD BY AUTOMATED COUNT: 12.7 % (ref 10–15)
GFR SERPL CREATININE-BSD FRML MDRD: >90 ML/MIN/1.73M2
GLUCOSE BLD-MCNC: 81 MG/DL (ref 70–99)
GLUCOSE UR STRIP-MCNC: NEGATIVE MG/DL
HCT VFR BLD AUTO: 49.4 % (ref 40–53)
HGB BLD-MCNC: 16.2 G/DL (ref 13.3–17.7)
HGB UR QL STRIP: NEGATIVE
IMM GRANULOCYTES # BLD: 0 10E3/UL
IMM GRANULOCYTES NFR BLD: 0 %
KETONES UR STRIP-MCNC: 10 MG/DL
LEUKOCYTE ESTERASE UR QL STRIP: NEGATIVE
LYMPHOCYTES # BLD AUTO: 1.8 10E3/UL (ref 0.8–5.3)
LYMPHOCYTES NFR BLD AUTO: 23 %
MCH RBC QN AUTO: 28.5 PG (ref 26.5–33)
MCHC RBC AUTO-ENTMCNC: 32.8 G/DL (ref 31.5–36.5)
MCV RBC AUTO: 87 FL (ref 78–100)
MONOCYTES # BLD AUTO: 0.8 10E3/UL (ref 0–1.3)
MONOCYTES NFR BLD AUTO: 10 %
MUCOUS THREADS #/AREA URNS LPF: PRESENT /LPF
NEUTROPHILS # BLD AUTO: 4.9 10E3/UL (ref 1.6–8.3)
NEUTROPHILS NFR BLD AUTO: 62 %
NITRATE UR QL: NEGATIVE
NRBC # BLD AUTO: 0 10E3/UL
NRBC BLD AUTO-RTO: 0 /100
OPIATES UR QL SCN: NORMAL
PH UR STRIP: 5.5 [PH] (ref 5–7)
PLATELET # BLD AUTO: 257 10E3/UL (ref 150–450)
POTASSIUM BLD-SCNC: 3.6 MMOL/L (ref 3.4–5.3)
PROT SERPL-MCNC: 7.2 G/DL (ref 6.8–8.8)
RBC # BLD AUTO: 5.69 10E6/UL (ref 4.4–5.9)
RBC URINE: <1 /HPF
SODIUM SERPL-SCNC: 139 MMOL/L (ref 133–144)
SP GR UR STRIP: 1.02 (ref 1–1.03)
TROPONIN I SERPL-MCNC: <0.015 UG/L (ref 0–0.04)
UROBILINOGEN UR STRIP-MCNC: NORMAL MG/DL
WBC # BLD AUTO: 7.9 10E3/UL (ref 4–11)
WBC URINE: 1 /HPF

## 2021-09-12 PROCEDURE — 93010 ELECTROCARDIOGRAM REPORT: CPT | Performed by: FAMILY MEDICINE

## 2021-09-12 PROCEDURE — 82040 ASSAY OF SERUM ALBUMIN: CPT | Performed by: FAMILY MEDICINE

## 2021-09-12 PROCEDURE — 85025 COMPLETE CBC W/AUTO DIFF WBC: CPT | Performed by: FAMILY MEDICINE

## 2021-09-12 PROCEDURE — 93005 ELECTROCARDIOGRAM TRACING: CPT | Performed by: FAMILY MEDICINE

## 2021-09-12 PROCEDURE — 81003 URINALYSIS AUTO W/O SCOPE: CPT | Performed by: FAMILY MEDICINE

## 2021-09-12 PROCEDURE — 99284 EMERGENCY DEPT VISIT MOD MDM: CPT | Performed by: FAMILY MEDICINE

## 2021-09-12 PROCEDURE — 36415 COLL VENOUS BLD VENIPUNCTURE: CPT | Performed by: FAMILY MEDICINE

## 2021-09-12 PROCEDURE — 84484 ASSAY OF TROPONIN QUANT: CPT | Performed by: FAMILY MEDICINE

## 2021-09-12 PROCEDURE — 99284 EMERGENCY DEPT VISIT MOD MDM: CPT | Mod: 25 | Performed by: FAMILY MEDICINE

## 2021-09-12 PROCEDURE — 80307 DRUG TEST PRSMV CHEM ANLYZR: CPT | Performed by: FAMILY MEDICINE

## 2021-09-13 LAB
ATRIAL RATE - MUSE: 55 BPM
DIASTOLIC BLOOD PRESSURE - MUSE: NORMAL MMHG
INTERPRETATION ECG - MUSE: NORMAL
P AXIS - MUSE: 54 DEGREES
PR INTERVAL - MUSE: 156 MS
QRS DURATION - MUSE: 82 MS
QT - MUSE: 396 MS
QTC - MUSE: 378 MS
R AXIS - MUSE: 8 DEGREES
SYSTOLIC BLOOD PRESSURE - MUSE: NORMAL MMHG
T AXIS - MUSE: 38 DEGREES
VENTRICULAR RATE- MUSE: 55 BPM

## 2021-09-13 NOTE — ED PROVIDER NOTES
"ED Provider Note  [facility]      History     Chief Complaint   Patient presents with     Paranoid     Called complaining of kidney and chest pain for the past 2 years. Has been seen for this issue. Wants surgery to fix it. Thinks people following him. Pt called 911.      HPI  Ravindra Oro is a 26 year old male who history of schizoaffective disorder, homelessness, frequent emergency department presentation.  He states he is here today because he feels that he needs his kidneys checked out.  States he has noted a foul odor to his urine.  Was recently diagnosed with balanitis and is being treated but states his urine is still foul-smelling and is concerned he may have a urinary tract infection.  He does not have dysuria, urgency or fever.  Denies any discharge.  States he frequently worries about his health and requested I obtain lab studies.  Reported chest pain to the paramedics.  States he has no chest pain now, denies shortness of breath, fever, cough, loss of taste or smell, runny nose or sore throat.  He states he often feels the police are \"following me around\", and believes they have done something to make his hair grow abnormally.  Denies hallucinations or delusions.  He states he frequently feels angry but currently has no thoughts of harm to self or others, and states he is not violent.  He states he would like to return to [place] native country but is uncertain do so.    Past Medical History  Past Medical History:   Diagnosis Date     Depressive disorder      Psychosis (H)      Schizoaffective disorder (H)      History reviewed. No pertinent surgical history.  acetaminophen (TYLENOL) 500 MG tablet  cholecalciferol (VITAMIN D) 1000 UNIT tablet  cyanocobalamin (VITAMIN B-12) 1000 MCG tablet  Fluocinolone Acetonide (DERMA-SMOOTHE/FS SCALP) 0.01 % OIL  FLUoxetine (PROZAC) 10 MG capsule  FLUoxetine (PROZAC) 20 MG capsule  fluticasone (FLONASE) 50 MCG/ACT nasal " spray  ibuprofen (ADVIL/MOTRIN) 800 MG tablet  ketoconazole (NIZORAL) 2 % shampoo  mirtazapine (REMERON) 30 MG tablet  OLANZapine (ZYPREXA) 5 MG tablet  OLANZapine zydis (ZYPREXA) 5 MG ODT      No Known Allergies  Family History  Family History   Problem Relation Age of Onset     Cancer No family hx of         No family history of skin cancer     Melanoma No family hx of      Skin Cancer No family hx of      Social History   Social History     Tobacco Use     Smoking status: Never Smoker     Smokeless tobacco: Never Used   Substance Use Topics     Alcohol use: Never     Drug use: Not Currently     Types: Marijuana      Past medical history, past surgical history, medications, allergies, family history, and social history were reviewed with the patient. No additional pertinent items.       Review of Systems  A complete review of systems was performed with pertinent positives and negatives noted in the HPI, and all other systems negative.    Physical Exam   BP: 129/86  Pulse: 62  Temp: 97.9  F (36.6  C)  Resp: 16  SpO2: 98 %  Physical Exam  Vitals and nursing note reviewed.   Constitutional:       General: He is not in acute distress.     Appearance: He is not diaphoretic.   HENT:      Head: Atraumatic.   Eyes:      General: No scleral icterus.     Pupils: Pupils are equal, round, and reactive to light.   Cardiovascular:      Heart sounds: Normal heart sounds.   Pulmonary:      Effort: No respiratory distress.      Breath sounds: Normal breath sounds.   Abdominal:      General: Bowel sounds are normal.      Palpations: Abdomen is soft.      Tenderness: There is no abdominal tenderness.   Musculoskeletal:         General: No tenderness.   Skin:     General: Skin is warm.      Findings: No rash.         ED Course      Procedures             EKG Interpretation:      Interpreted by Ellis Allen MD  Time reviewed:2041   Symptoms at time of EKG: Chest pain for 2 years  Rhythm: Sinus bradycardia  Rate: 50-60  Axis:  Normal  Ectopy: None  Conduction: Normal  ST Segments/ T Waves: No ST-T wave changes, No acute ischemic changes and Early repolarization  Q Waves: None  Comparison to prior: Unchanged    Clinical Impression: Sinus bradycardia rate 55, early repolarization variant, no change from previous      The medical record was reviewed and interpreted.  Current labs reviewed and interpreted.  Previous labs reviewed and interpreted.  EKG reviewed and interpreted: Sinus bradycardia rate 55 early repolarization variant otherwise unremarkable EKG.       Results for orders placed or performed during the hospital encounter of 09/12/21   Drug abuse screen 1 urine (ED)     Status: Normal   Result Value Ref Range    Amphetamines Urine Screen Negative Screen Negative    Barbiturates Urine Screen Negative Screen Negative    Benzodiazepines Urine Screen Negative Screen Negative    Cannabinoids Urine Screen Negative Screen Negative    Cocaine Urine Screen Negative Screen Negative    Opiates Urine Screen Negative Screen Negative   UA with Microscopic reflex to Culture     Status: Abnormal    Specimen: Urine, Midstream   Result Value Ref Range    Color Urine Light Yellow Colorless, Straw, Light Yellow, Yellow    Appearance Urine Clear Clear    Glucose Urine Negative Negative mg/dL    Bilirubin Urine Negative Negative    Ketones Urine 10  (A) Negative mg/dL    Specific Gravity Urine 1.025 1.003 - 1.035    Blood Urine Negative Negative    pH Urine 5.5 5.0 - 7.0    Protein Albumin Urine Negative Negative mg/dL    Urobilinogen Urine Normal Normal, 2.0 mg/dL    Nitrite Urine Negative Negative    Leukocyte Esterase Urine Negative Negative    Bacteria Urine Few (A) None Seen /HPF    Mucus Urine Present (A) None Seen /LPF    RBC Urine <1 <=2 /HPF    WBC Urine 1 <=5 /HPF    Narrative    Urine Culture not indicated   CBC with platelets differential     Status: None    Narrative    The following orders were created for panel order CBC with platelets  differential.  Procedure                               Abnormality         Status                     ---------                               -----------         ------                     CBC with platelets and d...[[id number]]                      Final result                 Please view results for these tests on the individual orders.   Comprehensive metabolic panel     Status: Abnormal   Result Value Ref Range    Sodium 139 133 - 144 mmol/L    Potassium 3.6 3.4 - 5.3 mmol/L    Chloride 106 94 - 109 mmol/L    Carbon Dioxide (CO2) 26 20 - 32 mmol/L    Anion Gap 7 3 - 14 mmol/L    Urea Nitrogen 15 7 - 30 mg/dL    Creatinine 0.83 0.66 - 1.25 mg/dL    Calcium 8.4 (L) 8.5 - 10.1 mg/dL    Glucose 81 70 - 99 mg/dL    Alkaline Phosphatase 40 40 - 150 U/L    AST 22 0 - 45 U/L    ALT 24 0 - 70 U/L    Protein Total 7.2 6.8 - 8.8 g/dL    Albumin 4.1 3.4 - 5.0 g/dL    Bilirubin Total 0.6 0.2 - 1.3 mg/dL    GFR Estimate >90 >60 mL/min/1.73m2   Troponin I     Status: Normal   Result Value Ref Range    Troponin I <0.015 0.000 - 0.045 ug/L   CBC with platelets and differential     Status: None   Result Value Ref Range    WBC Count 7.9 4.0 - 11.0 10e3/uL    RBC Count 5.69 4.40 - 5.90 10e6/uL    Hemoglobin 16.2 13.3 - 17.7 g/dL    Hematocrit 49.4 40.0 - 53.0 %    MCV 87 78 - 100 fL    MCH 28.5 26.5 - 33.0 pg    MCHC 32.8 31.5 - 36.5 g/dL    RDW 12.7 10.0 - 15.0 %    Platelet Count 257 150 - 450 10e3/uL    % Neutrophils 62 %    % Lymphocytes 23 %    % Monocytes 10 %    % Eosinophils 4 %    % Basophils 1 %    % Immature Granulocytes 0 %    NRBCs per 100 WBC 0 <1 /100    Absolute Neutrophils 4.9 1.6 - 8.3 10e3/uL    Absolute Lymphocytes 1.8 0.8 - 5.3 10e3/uL    Absolute Monocytes 0.8 0.0 - 1.3 10e3/uL    Absolute Eosinophils 0.3 0.0 - 0.7 10e3/uL    Absolute Basophils 0.1 0.0 - 0.2 10e3/uL    Absolute Immature Granulocytes 0.0 <=0.0 10e3/uL    Absolute NRBCs 0.0 10e3/uL   Urine Drugs of Abuse Screen     Status: Normal    Narrative     The following orders were created for panel order Urine Drugs of Abuse Screen.  Procedure                               Abnormality         Status                     ---------                               -----------         ------                     Drug abuse screen 1 urin...[[id number]]  Normal              Final result                 Please view results for these tests on the individual orders.     Medications - No data to display     Assessments & Plan (with Medical Decision Making)   26-year-old male with history of schizoaffective disorder presenting with multiple complaints including paranoid feelings, concerns about a urinary tract infection, 2 years of chest pain, and requesting labs for medical evaluation.  On exam he has normal vital signs.  He is cooperative, does not appear to respond to internal stimuli.  Pleasant.  Denies thoughts of harm to self or others.  States he does not believe he needs or [facility]  He listed numerous medical complaints for which he has been evaluated in the past at [facility], requesting labs and an EKG which were obtained in which were unremarkable.  Based on his vital signs, physical exam, prior history, tonight's presentation, and [facility], I found no evidence of an acute life-threatening medical condition.  Offered him mental health evaluation again, he declines and states that he does have a safe place to go tonight.  I have no grounds to hold him involuntarily, does not appear to represent an imminent risk of harm to self or others.  Will be provided outpatient mental health and medical resources.  I have reviewed the nursing notes. I have reviewed the findings, diagnosis, plan and need for follow up with the patient.    New Prescriptions    No medications on file       Final diagnoses:   Chest pain, unspecified type   Paranoia (H)       --  Ellis Allen MD  [facility] EMERGENCY  [facility]  9/12/2021     Ellis Allen MD  09/12/21 7966

## 2021-09-13 NOTE — DISCHARGE INSTRUCTIONS
Thank you for choosing [facility].     Please closely monitor for further symptoms. Return to the Emergency Department if you develop any new or worsening signs or symptoms.    If you received any opiate pain medications or sedatives during your visit, please do not drive for at least 8 hours.     Labs, cultures or final xray interpretations may still need to be reviewed.  We will call you if your plan of care needs to be changed.    Please follow up with your primary care physician or clinic.    Indications for return to Emergency Department or to seek further assistance:  Thoughts of harm to self or others that you feel you may act on  Thoughts of suicide  Feeling unsafe  Major changes in mood, sleep or apetite  Difficulty concentrating or completing regular daily tasks/ functions  Feelings of paranoia, or feelings that you are losing touch with reality  Resources for Mental Health:  *(asterisk indicates free service)    *National Suicide Prevention Lifeline  1-500.806.4049     *[facility]'s National Help Line  (Treatment Referral Routing for Mental & Chemical Health)  1-300.866.1788      *[facility] Counsel[facility]- [facility]  [address], [place]  [phone]  Mon, Wed, & Fri 1PM-3PM  Mon, Tues, Wed, & Thu 630PM-830PM  (no appointment needed)    *Walk-In Counseling Center- [facility]  [address], [place], [place]  Tue & Thu 6PM-8PM  (no appointment needed)    *[address], [place], [place]  [phone]  [email or web address]      [facility] Crisis Lines    [facility] Crisis Line  [phone] [facility] Crisis Line  [phone]   [facility] Crisis Line  [phone] [facility] Crisis Line  [phone]   [facility] Crisis Line  [phone] [facility] Crisis Line  [phone] for 8AM-430PM  [phone] for 430PM-8AM   [facility] Crisis Line  [phone] [facility] Crisis Line  [phone]     Outpatient Mental Health Clinics    *[facility]  [address] [place], [place]  [phone] *[facility]  [address] [place], [place]  [phone]    *[facility])  [address] [place], [place]  [phone] [facility]  [address] [place] [address]  [phone]    [facility]  [address]  [place], [place]  [phone] [facility]  [address]  [place], [place]  [phone]   [facility]  [address] [place], [place]  [phone] [facility]  [address]  [place], [place]  [phone]   [facility]  [address] [place], [place]  [phone] [facility]  [address]  [place], [place]   [phone]   [facility]  [address]   [phone] Behavioral Health and Wellness Clinic  [address] [address] [phone]   *[facility] Crisis: COPE: ([phone]) 24 hour mobile crisis support for people having a mental health crisis in [facility].   *Acute Psychiatric Services ([phone]). 24-hour walk-in crisis psychiatric support at [facility]; Emergency Medications Clinic available 7:30am - 2:00pm  *Crisis Connection: ([phone]) 24-hour confidential telephone counseling   *[facility] Emergency Room: [phone]  *[place] [facility] ([facility]) : [facility] connects people seeking recovery to resources that help foster and sustain long-term recovery. Whether you are seeking resources for treatment, transportation, housing, job training, education, health or other pathways to recovery, [facility] is a great place to start. [phone] [email or web address]

## 2021-09-13 NOTE — ED NOTES
Bed: HW02  Expected date: 9/12/21  Expected time: 7:34 PM  Means of arrival:   Comments:  [id number]  26 M  Psych eval

## 2021-09-17 ENCOUNTER — OFFICE VISIT (OUTPATIENT)
Dept: FAMILY MEDICINE | Facility: CLINIC | Age: 26
End: 2021-09-17
Payer: COMMERCIAL

## 2021-09-17 VITALS
RESPIRATION RATE: 14 BRPM | BODY MASS INDEX: 51.45 KG/M2 | DIASTOLIC BLOOD PRESSURE: 86 MMHG | SYSTOLIC BLOOD PRESSURE: 133 MMHG | OXYGEN SATURATION: 100 % | HEART RATE: 63 BPM | TEMPERATURE: 97.6 F | WEIGHT: 148.2 LBS

## 2021-09-17 DIAGNOSIS — Z59.819 HOUSING SITUATION UNSTABLE: ICD-10-CM

## 2021-09-17 DIAGNOSIS — M62.89 MUSCLE TIGHTNESS: Primary | ICD-10-CM

## 2021-09-17 PROCEDURE — 99203 OFFICE O/P NEW LOW 30 MIN: CPT | Mod: GC | Performed by: FAMILY MEDICINE

## 2021-09-17 RX ORDER — ACETAMINOPHEN 325 MG/1
325 TABLET ORAL EVERY 8 HOURS PRN
Qty: 30 TABLET | Refills: 0 | Status: SHIPPED | OUTPATIENT
Start: 2021-09-17

## 2021-09-17 RX ORDER — ACETAMINOPHEN 325 MG/1
325 TABLET ORAL EVERY 8 HOURS PRN
Qty: 7 TABLET | Refills: 0 | Status: SHIPPED | OUTPATIENT
Start: 2021-09-17 | End: 2021-09-17

## 2021-09-17 SDOH — ECONOMIC STABILITY - HOUSING INSECURITY: HOUSING INSTABILITY UNSPECIFIED: Z59.819

## 2021-09-17 NOTE — PROGRESS NOTES
Preceptor Attestation:   Patient seen, evaluated and discussed with the resident. I have verified the content of the note, which accurately reflects my assessment of the patient and the plan of care.   Supervising Physician:  Boo Boateng MD

## 2021-09-17 NOTE — PROGRESS NOTES
"    Assessment & Plan     Schizoaffective Disorder  Major Depressive Disorder w/ Psychotic/Paranoia Features  Somatic Delusions  Muscle Tightness  Patient has an extensive history w/ somatic delusions vis a vis schizoaffective disorder and MDD w/ psychotic features. His complaint today revolved around vague \"tightness\" of his body and muscles.    It is possible he may have dehydration from sweat - considering this is something he acknowledged, and wears double layering of thick clothing. However, given his history and exam findings, these reported symptoms are likely part of his delusions.    Recommend drinking 8 glasses of water a day, consider drinking Powerade/Gaterade if feeling dehydrated    Will send for a script of over the counter acetaminophen 325 to take as needed for pain    I have recommended to the patient to follow up with me within a month to re-assess how he is doing. Due to his delusional disorder, patient would benefit from consistent point of contact of care.       Housing situation unstable  Per chart and patient, he was homeless. He states, however, he is now living in a group home w/ his own room through [facility]. Additionally, chart hx shows he does receive services through [facility]. He states he does have \"access to services\" but did not state specifically what they were. Per chart history, [facility] visit  Dr. Chakraborty also reached out to [facility] services to reach out to patient.    Educated the patient on resources to reach out to, went over them in his AVS, and sent in a behavorial health referral for this as well.            BMI:   Estimated body mass index is 51.45 kg/m  as calculated from the following:    Height as of 5/18/21: 1.143 m (3' 9\").    Weight as of this encounter: 67.2 kg (148 lb 3.2 oz).       Return in about 1 month (around 10/17/2021).    Bethel Eldridge DO  [facility] JAIRO Waite is a " "26 year old who presents for muscle tightness.     HPI   Somatic Concern    Started 3 weeks ago - does not identify any recent injuries / trauma / sickness during this time or before.    Describes a \"whole body tightness\" w/o much elaboration or deeper descriptions. Denies pain / aches / soreness. When asked \"does it feel like someone is pulling a wrap over your\", he agreed. States he thinks this is due to the air at his living space.    He states his head also feels \"tight\", but no headache or pain. States he thinks this is due to his hair.    He states his nose feels likes there are rocks in it. Denies rhinorrhea/bleeding from nose. Endorses some congestion.     He states his teeth / jaw / gums are also tight. Denies pain w/ eating / swallowing / chewing. Denies discoordinate w/ eating / swallowing.     Acetaminophen seems to help with this tightness, as does drinking water    He states that his tightness feels worse when he is sweating    Briefly mentioned \"something is wrong with my private parts\" - describing itching / dryness. When asked if he saw someone for this, he says 3 weeks ago where they gave him a lotion.    Briefly mentioned wanting to get a circumcision - but that his insurance wouldn't, and then would, pay for it.     Per Chart Hx  Seen yesterday at [facility] w/ Dr. Chakraborty for concerns of chronic penile pain and request for circumcision surgery. This was apparently something he had discussed w/ Urology in 3/2021 as an elective surgery. He was seen by a primary in 8/2021 for similar complaints of pain / discomfort. At that time he was evaluated for balanitis w/ negative STI w/u. Since that time he had been using an antifungal cream w/o improvement.     PE during this encounter did not demonstrate signifcant findings. Dr. Chakraborty had concrns related to the patients uncontrolled psychiatric health, and that this may be affecting his insight into his symptoms and management. Per that encounter, patient " told Dr. Chakraborty he would come to \[facility]\"" to get this circumcision if it would not be done by [facility]; and had refused psychiatric care.     Patient has had a history of repeated encounters this past year with local ED for somatic delusions vis a vis schizoaffective disorder and major depressive disorder w/ psychotic features - often back to back visits.     His most recent psychiatric evaluation occurred at [facility] treatment, where he left before his discharge date - diagnoses included MDD, possible PTSD, delusional disorder. His medications during this time included mirtazapine / fluoxetine / olanzapine.     He was also treated in psychiatric care  in [place], [place] in 2020 when he was admitted to [facility] w/ somatic delusions. He was assessed to have schizoaffective disorder and MDD w/ psychosis.       Review of Systems   As above.       Objective    There were no vitals taken for this visit.  There is no height or weight on file to calculate BMI.  Physical Exam   GENERAL: healthy, alert and no distress  NECK: no adenopathy, no asymmetry, masses, or scars and thyroid normal to palpation  RESP: lungs clear to auscultation - no rales, rhonchi or wheezes  CV: regular rate and rhythm, normal S1 S2, no S3 or S4, no murmur, click or rub, no peripheral edema and peripheral pulses strong  ABDOMEN: soft, nontender, no hepatosplenomegaly, no masses and bowel sounds normal  MS: no gross musculoskeletal defects noted, no edema  PSYCH:  Appearance - Disheveled. Wearing layers of think clothing - sweater over a hoodie, with valdez pulled up over a beanie. Thick sweat pants covered in stickers that he put on to make them look interesting. Nails are dirty and unequal in cut/length. Has a scruffy beard w/ mustache. No overt skin lesions were seen on face / neck / hands. Strong smell of tobacco smoke.  Behavior: Attentive to examiner, responds to questions asked w/ relevant answers. Generally  "cooperative w/ exam. Would make eye contact w/ examiner. However, during physical exam, would not let examiner look into ears, nor look at his head w/o his hoodie/beanie on - these preferences to not examine, however, were asked by the patient to the examiner politely w/o agitation/aggresion, even when examiner asked again.   Speech: Clear, does not mumble. Uses appropriate language/words. Vikki normal. No word salad /  Neologisms.  Mood: \"Im ok, but I am feeling tight\"  Emotive: Full, generally. Would display emotive behaviors in speech - such as when the examiner asked if he felt congested he lit up in agreement, or when describing his muscle tightness he would present an exhausted demeanor.   Psychomotor: No agitation or retardation. Strength was intact 4/4 B/L UE and LE. Reflexes 3/4 UE and LE B/L. Coordination intact.   Thought Process: Presents directed thought, but with aspect of inhibited thinking - cannot fully describe his tightness and contexts around his tightness. When asked about prior events and associated symptoms, his responses generally were \"I don't know, I just feel tight\".  Thought Content: Is presenting somatic delusions, consistent with his chart history. Cannot fully describe his symptoms behind \"tight\", but denies more over physical symptoms such as pain / sore / ache / weakness. Perseverates on \"tight\" feelings throughout his body, and sometimes mentioning \"rocks\" in his wrist and nose. Denies AVH. Denies SI/HI.   Insight: Poor, as evidenced by his very recent visits w/ EDs for somatic delusions. As well, he mentioned to examiner people have told him he as \"stress\" and \"mental illness\", but this is untrue and unlikely to be related to his symptoms.   Judgement: Poor, as evidenced by his general noncompliance w/ medications and continued visits to different EDs for somatic complaints.   Cognition: AAOx3. Short and long term memory are intact.       ----- Service Performed and Documented by " Resident or Fellow ------

## 2021-09-17 NOTE — PATIENT INSTRUCTIONS
Patient Education   Here is the plan from today's visit    1. Muscle tightness  - Drink more water / gatorade / powerade for dehydration  - You can take over the counter tylenol     2. Social Supports  - You are receiving support from [facility]. I put some other resources at the bottom of this page if you need more help/resources.    Please call or return to clinic if your symptoms don't go away.    Follow up plan  Return in about 1 month (around 10/17/2021).  To follow up on your muscle tightness and see if its getting better.  Thank you for coming to [facility] today.  COVID-19 Vaccine:  [facility] has walk-in appointments for COVID-19 vaccines. No appointment needed!   You also have the option of receiving Moderna vaccine during your physician appointment. Please ask your care team for more information!  Lab Testing:  **If you had lab testing today and your results are reassuring or normal they will be mailed to you or sent through [facility] within 7 days.   **If the lab tests need quick action we will call you with the results.  **If you are having labs done on a different day, please call [phone] to schedule at [facility] or [phone] for other [facility] locations.   The phone number we will call with results is # [phone] (home) . If this is not the best number please call our clinic and change the number.  Medication Refills:  If you need any refills please call your pharmacy and they will contact us.   If you need to  your refill at a new pharmacy, please contact the new pharmacy directly. The new pharmacy will help you get your medications transferred faster.   Scheduling:  If you have any concerns about today's visit or wish to schedule another appointment please call our office during normal business hours [phone] (8-5:00 M-F)  If a referral was made to a [facility] and you don't get a call from [place]  scheduling please call [phone].  If a Mammogram was ordered for you at [facility] call [phone] to schedule or change your appointment.  If you had an EKG/XRay/CT/Ultrasound/MRI ordered the number is [phone] to schedule or change your radiology appointment.   Medical Concerns:  If you have urgent medical concerns please call [phone] at any time of the day.    Bethel Eldridge DO         [facility]  [address]  [place], [place]  [phone]    [facility]  [facility]  [address], [address]  [place] [address]  [phone]

## 2021-09-21 ENCOUNTER — TELEPHONE (OUTPATIENT)
Dept: PSYCHOLOGY | Facility: CLINIC | Age: 26
End: 2021-09-21

## 2021-09-21 NOTE — TELEPHONE ENCOUNTER
Dr. Rothman provided referrals on the AVS of appt with Dr. Eldridge, and these were dicussed w/ patient. [facility] psychiatry/psychotherapy.

## 2021-10-02 ENCOUNTER — HOSPITAL ENCOUNTER (EMERGENCY)
Facility: CLINIC | Age: 26
Discharge: HOME | End: 2021-10-03
Attending: EMERGENCY MEDICINE | Admitting: EMERGENCY MEDICINE
Payer: COMMERCIAL

## 2021-10-02 DIAGNOSIS — R45.851 SUICIDAL IDEATION: Primary | ICD-10-CM

## 2021-10-02 DIAGNOSIS — F32.A DEPRESSION, UNSPECIFIED DEPRESSION TYPE: ICD-10-CM

## 2021-10-02 LAB
AMPHETAMINES UR QL SCN: NORMAL
BARBITURATES UR QL: NORMAL
BENZODIAZ UR QL: NORMAL
CANNABINOIDS UR QL SCN: NORMAL
COCAINE UR QL: NORMAL
OPIATES UR QL SCN: NORMAL

## 2021-10-02 PROCEDURE — 99285 EMERGENCY DEPT VISIT HI MDM: CPT | Mod: 25 | Performed by: EMERGENCY MEDICINE

## 2021-10-02 PROCEDURE — C9803 HOPD COVID-19 SPEC COLLECT: HCPCS | Performed by: EMERGENCY MEDICINE

## 2021-10-02 PROCEDURE — 80307 DRUG TEST PRSMV CHEM ANLYZR: CPT | Performed by: EMERGENCY MEDICINE

## 2021-10-02 PROCEDURE — 99284 EMERGENCY DEPT VISIT MOD MDM: CPT | Performed by: EMERGENCY MEDICINE

## 2021-10-03 VITALS
HEART RATE: 59 BPM | SYSTOLIC BLOOD PRESSURE: 113 MMHG | OXYGEN SATURATION: 100 % | RESPIRATION RATE: 16 BRPM | TEMPERATURE: 97.6 F | DIASTOLIC BLOOD PRESSURE: 79 MMHG

## 2021-10-03 LAB — SARS-COV-2 RNA RESP QL NAA+PROBE: NEGATIVE

## 2021-10-03 PROCEDURE — U0003 INFECTIOUS AGENT DETECTION BY NUCLEIC ACID (DNA OR RNA); SEVERE ACUTE RESPIRATORY SYNDROME CORONAVIRUS 2 (SARS-COV-2) (CORONAVIRUS DISEASE [COVID-19]), AMPLIFIED PROBE TECHNIQUE, MAKING USE OF HIGH THROUGHPUT TECHNOLOGIES AS DESCRIBED BY CMS-2020-01-R: HCPCS | Performed by: EMERGENCY MEDICINE

## 2021-10-03 PROCEDURE — 250N000013 HC RX MED GY IP 250 OP 250 PS 637: Performed by: EMERGENCY MEDICINE

## 2021-10-03 PROCEDURE — 90791 PSYCH DIAGNOSTIC EVALUATION: CPT

## 2021-10-03 RX ORDER — ACETAMINOPHEN 500 MG
1000 TABLET ORAL ONCE
Status: COMPLETED | OUTPATIENT
Start: 2021-10-03 | End: 2021-10-03

## 2021-10-03 RX ADMIN — ACETAMINOPHEN 1000 MG: 500 TABLET ORAL at 00:49

## 2021-10-03 ASSESSMENT — ENCOUNTER SYMPTOMS
COLOR CHANGE: 0
NERVOUS/ANXIOUS: 1
VOMITING: 0
FREQUENCY: 0
FLANK PAIN: 0
LIGHT-HEADEDNESS: 0
FATIGUE: 0
NECK PAIN: 0
COUGH: 0
BACK PAIN: 0
DIARRHEA: 0
ABDOMINAL PAIN: 0
NAUSEA: 0
NUMBNESS: 0
DIFFICULTY URINATING: 0
RHINORRHEA: 0
HALLUCINATIONS: 0
CONSTIPATION: 0
CHILLS: 0
HEADACHES: 0
FEVER: 0
DYSPHORIC MOOD: 1
PALPITATIONS: 0
HYPERACTIVE: 0
HEMATURIA: 0
CONFUSION: 0
EYE REDNESS: 0
DYSURIA: 0
SHORTNESS OF BREATH: 0
WEAKNESS: 0
APPETITE CHANGE: 0
AGITATION: 0
WOUND: 0
ARTHRALGIAS: 0
NECK STIFFNESS: 0
SLEEP DISTURBANCE: 1
DECREASED CONCENTRATION: 0
BRUISES/BLEEDS EASILY: 0

## 2021-10-03 NOTE — ED NOTES
Called [facility] ( spoke with Kishan) to inform them of covid results and that patient is ready for discharge.   Center requested to send pt after 830 am.

## 2021-10-03 NOTE — DISCHARGE INSTRUCTIONS
A referral has been made for Crisis Residence services at [facility] (Kishan, admissions)  Phone [phone]  fax [phone]  Address: Address: [address] [place] [address]    If I am feeling unsafe or I am in a crisis, I will: CALL COPE [phone]  Contact my established care providers   Go to the nearest emergency room   Call [phone]     Warning signs that I or other people might notice when a crisis is developing for me:   I am feeling lonely    Things I am able to do on my own to cope or help me feel better:   Talk to people  Walk  Take prescribed medications    Things that I am able to do with others to cope or help me better:   Be outside    Things I can use or do for distraction:   Walking  Being outside    Changes I can make to support my mental health and wellness:     Lifestyle Adjustment:   1. Adjust your lifestyle to get enough sleep, relaxation, exercise and good nutrition.  Continue to develop healthy coping skills to decrease stress and promote a healthy  lifestyle.  2. Abstain from all substances of abuse.  3. Take medications as prescribed.  Please work with your doctor to discuss any concerns you have with your medications or side effects you may be experiencing.  4. Follow up with appointments as scheduled.      General Medication Instructions:   1. See your medication sheet(s) for instructions.   2. Take all medicines as directed.  Make no changes unless your doctor suggests them.   3. Go to all your doctor visits.  4. Be sure to have all your required lab tests. This way, your medicines can be refilled on time.  5. Do not use any drugs not prescribed by your doctor.     People in my life that I can ask for help:   [facility] worker    Please call your Primary Care Provider, your clinic Psychiatrist,  Call the National Suicide Prevention Lifeline: 916.103.2180, 911 or Acute Psychiatric Services (APS) at [phone] at [facility] [place] [facility]. It is located on the 1st floor of the [facility] ([address]) if:  - you have not slept for 2 or more nights in a row  - you have thoughts of self-harm  - you have thoughts of harming others  - you have increased depression  - you have feelings of helplessness or hopelessness  - you are unable to enjoy or complete usual activities     Your [facility] has a mental health crisis team you can call 24/7: [phone]    Other things that are important when I m in crisis: Remember that I am not alone.  I have a  and an [facility] worker that are assigned to help me    Additional resources and information:     Outpatient Mental Health Clinics   *[facility]  [address] [place], [place]  [phone] *[facility]  [address] [place], [place]  [phone]    *[facility]  [address] [place], [place]  [phone] [facility]  [address] [place] [address]  [phone]    [facility]  [address]  [place], [place]  [phone] [facility]  [address]  [place], [place]  [phone]   [facility]  [address] [place], [place]  [phone] [facility]  [address]  [place], [place]  [phone]   [facility]  [address] [place], [place]  [phone] [facility]  [address]  [place], [place]   [phone]   [facility]  [address]   [phone] Behavioral Health and Wellness Clinic  [address] [address] [phone]     [facility]  [address] [place], [place]   Phone: [phone]  Hours:  Monday, Wednesday, Friday - 1pm-3pm  Monday-Thursday - 6:30pm-8:30pm

## 2021-10-03 NOTE — ED PROVIDER NOTES
"ED Provider Note  [facility]      History     Chief Complaint   Patient presents with     Depression     \"I am extremely depressed with no hope,\" suicidal thoughts, denies plans.     HPI  Ravindra Oro is a 26 year old male who presents for evaluation of suicidal ideation.  Patient notes baseline depression and has had exacerbation of his depression recently, he has thoughts of wanting to hurt himself without specific plan.  Does have history of suicide attempts and psychiatric hospitalizations in the past.  He notes that he supposed be taking medications for his mental health however notes that he intermittently forgets to take these.  Denies any hallucinations or thoughts of wanting to hurt others.  He denies any physical concerns.  The patient denies any other concerns today.     Past Medical History  Past Medical History:   Diagnosis Date     Depressive disorder      Psychosis (H)      Schizoaffective disorder (H)      History reviewed. No pertinent surgical history.  acetaminophen (TYLENOL) 325 MG tablet  acetaminophen (TYLENOL) 500 MG tablet  cholecalciferol (VITAMIN D) 1000 UNIT tablet  cyanocobalamin (VITAMIN B-12) 1000 MCG tablet  Fluocinolone Acetonide (DERMA-SMOOTHE/FS SCALP) 0.01 % OIL  FLUoxetine (PROZAC) 10 MG capsule  FLUoxetine (PROZAC) 20 MG capsule  fluticasone (FLONASE) 50 MCG/ACT nasal spray  ibuprofen (ADVIL/MOTRIN) 800 MG tablet  ketoconazole (NIZORAL) 2 % shampoo  mirtazapine (REMERON) 30 MG tablet  OLANZapine (ZYPREXA) 5 MG tablet  OLANZapine zydis (ZYPREXA) 5 MG ODT      No Known Allergies  Family History  Family History   Problem Relation Age of Onset     Cancer No family hx of         No family history of skin cancer     Melanoma No family hx of      Skin Cancer No family hx of      Social History   Social History     Tobacco Use     Smoking status: Never Smoker     Smokeless tobacco: Never Used   Substance Use Topics     Alcohol use: Never     Drug use: Not " Currently     Types: Marijuana      Past medical history, past surgical history, medications, allergies, family history, and social history were reviewed with the patient. No additional pertinent items.       Review of Systems   Constitutional: Negative for appetite change, chills, fatigue and fever.   HENT: Negative for congestion, ear pain and rhinorrhea.    Eyes: Negative for redness and visual disturbance.   Respiratory: Negative for cough and shortness of breath.    Cardiovascular: Negative for chest pain and palpitations.   Gastrointestinal: Negative for abdominal pain, constipation, diarrhea, nausea and vomiting.   Genitourinary: Negative for difficulty urinating, dysuria, flank pain, frequency, hematuria and urgency.   Musculoskeletal: Negative for arthralgias, back pain, neck pain and neck stiffness.   Skin: Negative for color change, rash and wound.   Allergic/Immunologic: Negative for immunocompromised state.   Neurological: Negative for syncope, weakness, light-headedness, numbness and headaches.   Hematological: Does not bruise/bleed easily.   Psychiatric/Behavioral: Positive for dysphoric mood, sleep disturbance and suicidal ideas. Negative for agitation, behavioral problems, confusion, decreased concentration, hallucinations and self-injury. The patient is nervous/anxious. The patient is not hyperactive.    All other systems reviewed and are negative.    A complete review of systems was performed with pertinent positives and negatives noted in the HPI, and all other systems negative.    Physical Exam   BP: 120/76  Pulse: 99  Temp: 97.6  F (36.4  C)  Resp: 16  SpO2: 97 %     Physical Exam  Vitals and nursing note reviewed.   Constitutional:       General: He is not in acute distress.     Appearance: He is not ill-appearing, toxic-appearing or diaphoretic.   HENT:      Head: Normocephalic and atraumatic.      Right Ear: External ear normal.      Left Ear: External ear normal.      Nose: Nose normal.       Mouth/Throat:      Mouth: Mucous membranes are moist.   Eyes:      Extraocular Movements: Extraocular movements intact.      Conjunctiva/sclera: Conjunctivae normal.      Pupils: Pupils are equal, round, and reactive to light.   Cardiovascular:      Rate and Rhythm: Normal rate and regular rhythm.      Pulses: Normal pulses.      Heart sounds: Normal heart sounds. No murmur heard.   No friction rub. No gallop.    Pulmonary:      Effort: Pulmonary effort is normal. No respiratory distress.      Breath sounds: Normal breath sounds. No stridor. No wheezing, rhonchi or rales.   Abdominal:      General: Bowel sounds are normal. There is no distension.      Tenderness: There is no abdominal tenderness. There is no right CVA tenderness, left CVA tenderness, guarding or rebound.   Musculoskeletal:         General: Normal range of motion.      Cervical back: Normal range of motion and neck supple. No rigidity.      Right lower leg: No edema.      Left lower leg: No edema.   Skin:     General: Skin is warm.      Capillary Refill: Capillary refill takes less than 2 seconds.   Neurological:      General: No focal deficit present.      Mental Status: He is alert.   Psychiatric:         Attention and Perception: Attention normal. He does not perceive auditory or visual hallucinations.         Mood and Affect: Mood is anxious and depressed. Mood is not elated. Affect is blunt and flat. Affect is not labile, angry or tearful.         Speech: He is communicative. Speech is tangential. Speech is not rapid and pressured, delayed or slurred.         Behavior: Behavior is slowed. Behavior is not agitated, aggressive, withdrawn, hyperactive or combative. Behavior is cooperative.         Thought Content: Thought content is not paranoid or delusional. Thought content includes suicidal ideation. Thought content does not include homicidal ideation. Thought content does not include homicidal or suicidal plan.         Cognition and Memory:  Cognition and memory normal.         Judgment: Judgment normal.         ED Course      Procedures: none.        The medical record was reviewed and interpreted.  Current labs reviewed and interpreted.    Assessments & Plan (with Medical Decision Making)   Nursing notes and vital signs reviewed.     Presentation, exam, and workup is most consistent with suicidal ideation and depression.    Please see HPI, ROS, exam, and ED course above for pertinent history and findings. In short, the patient presented to the ED for evaluation of suicidal ideation without plan and exacerbation of baseline depression.  He is voluntary to speak with [facility] , as he would like inpatient treatment of his suicidal ideation.  He denies any other concerns at this time.  At the time of signout, plan is for assessment by [facility]  and disposition pending their evaluation.      The patient's care was signed out to Dr. Dukes at the end of my shift; please see their note for final diagnosis and disposition.      Final diagnoses:   Suicidal ideation   Depression, unspecified depression type       --  Julee Dempsey MD   [facility] EMERGENCY DEPARTMENT  10/2/2021     Julee Dempsey MD  10/03/21 0012

## 2021-10-03 NOTE — PROGRESS NOTES
10/2/2021  Ravindra Oro 1995     [facility] Crisis Assessment:    Started at: 4:05am  Completed at: 5:15am  Patient was assessed via virtually (AmWell cart or other teleconferencing device).    Chief Complaint and History of Presenting Problem:    Patient is a 26 year old Black male who presented to the ED by Self related to concerns for suicidal without a specific plan. He walked to the ED from [facility].  He was crying and was thinking about going home.  He can't find peace of mind, no matter how hard he tries.  He wants to go to [email or web address] and stay there for 10 days.  He wants someone to talk to about depression and group.  He does not endorse SI/SIB/HI at the time of assessment, but reports feeling very alone.  He has current providers, including [facility] and case management, but at the time of interview, he doesn't remember their names.  He reports he reports getting medications from [facility].  He was evaluated at [facility] on Friday for a similar presentation.     Assessment and intervention involved meeting with pt, obtaining collateral from [facility] and [facility] Everywhere records, employing crisis psychotherapy including: Establishing rapport, Active listening, Assess dimensions of crisis, Apply solution-focused therapy to address current crisis, Identify additional supports and alternative coping skills, Motivational Interviewing, Brief Supportive Therapy and Trauma-Informed Care.     Biopsychosocial Background and Demographic Information    He thought about going back home to [place] - all of his family is there and he is the only one here.  [facility] is where they live.  He is not working because he will lose his apartment on [address] [place] [address].  He lives alone.  It is a [facility] apartment.  Pt is originally from [place] and has been in the [place] since [place]. He lived in [place] and then in [place] for several years before returning to [place]. He states that he has struggled since returning here, he has no family  "or friends here     Mental Health History and Current Symptoms   Mr Oro has been evaluated in the ED 61 times for various psychiatric and somatic complaints.  Homelessness is a part of his clinical picture, but not presently as he has a H apartment.  Pt has a history of various diagnoses including but not limited to Schizoaffective DisorderMDD, Anxiety, PTSD, Anti-social Personality Disorder and Psychosis as well as Malingering.  Pt reports paranoia at his baseline; this usually centers around fears that he is being poisoned or harmedby others. He is not compliant with recommendations, medications and referrals. He presents to ED's frequently (over 61 evaluations). He utilizes crisis residences often.      Patient identifies historical diagnoses of schizoaffective disorder, MDD with psychosis.. At baseline, patient describes their mental health symptoms as \"I want to go back home, but I don't have money for plan tickets.      Mental Health History (prior psychiatric hospitalizations, civil commitments, programmatic care, etc):Hx of psychiatric hospitalization many years ago.  Hx of crisis stabilization programming.    Family Mental and Chemical Health History: Denies    Current and Historic Psychotropic Medications: Remeron (current) - Past: Olanzapine 5 mg at bedtime, Fluoxetine 10 mg Qday, Mirtazapine 15 mg QHS    Medication Adherent: Just obtained medications from [facility] on 10/1  Recent medication changes? Denies    Relevant Medical Concerns  Patient identifies concerns with completing ADLs? No  Patient can ambulate independently? Yes  Other medical health concerns? No  History of concussion or TBI? No     Trauma History   Physical, Emotional, or Sexual abuse: No  Loss of a friend or family member to suicide: No  Other identified traumatic event or significant stressor: Yes and [place]igrated to the [place] and has no family here    Substance Use History and Treatments  Patient denies any use of alcohol or illicit " substances. However, EMR reveals history of cannabis use. No history of CD treatment.    Drug screen/BAL/Breathalyzer Completed? Yes  Results: NEG     History of Suicidal Ideation, Suicide Attempts, Non-Suicidal Self Injury, and Risk Formulation:   Details of Current Ideation, Attempt(s), Plan(s): Denies   Risk factors:  living alone and loss of relationship, separation/divorce, etc..   Protective factors: strong bond to family/friends, community support, identification of future goals and future oriented towards goals, hopes, dreams.  History and Prior Methods of Self-injury: N/A  History of Suicide Attempts: Denies    ESS-6  1.a. Over the past 2 weeks, have you had thoughts of killing yourself? Yes   1.b. Have you ever attempted to kill yourself and, if yes, when did this last happen? No  2. Recent or current suicide plan? No  3. Recent or current intent to act on ideation? No  4. Lifetime psychiatric hospitalization? Yes  5. Pattern of excessive substance use? No  6. Current irritability, agitation, or aggression? No  ESS-6 Score: Low      Other Risk Areas  Aggressive/assumptive/homicidal risk factors: No   Sexually inappropriate behavior? No      Vulnerability to sexual exploitation? No     Clinical Presentation and Current Symptoms   Patient presents with depressive sx of hopelessness and depression.  He wants to go to [place] to be with family but doesn't have the money.  He lives alone in a [facility] apartment and does not work.  He recently obtained medications at [facility].  He walked to the ED from [facility] and asked to be referred to a Crisis Residence    Attention, Hyperactivity, and Impulsivity: No   Anxiety:No    Behavioral Difficulties: Yes: Wandering   Mood Symptoms: Yes: Feelings of hopelessness    Appetite: No   Feeding and Eating: No  Interpersonal Functioning: No  Learning Disabilities/Cognitive/Developmental Disorders: No   General Cognitive Impairments: Yes: Judgment/Insight  If yes, see completed Mini-Cog  Assessment below.  Sleep: No   Psychosis: No    Trauma: No       Mental Status Exam:  Affect: Appropriate and Flat  Appearance: Appropriate   Attention Span/Concentration: Attentive    Eye Contact: Engaged  Fund of Knowledge: Appropriate   Language /Speech Content: Fluent  Language /Speech Volume: Normal   Language /Speech Rate/Productions: Normal   Recent Memory: Variable  Remote Memory: Variable  Mood: Normal   Orientation:   Person: Yes   Place: Yes  Time of Day: Yes   Date: No   Situation (Do they understand why they are here?): Yes and Answer: I want to go to [facility] (or other) crisis residence for 10 days   Psychomotor Behavior: Normal   Thought Content: Clear  Thought Form: Goal Directed and Intact      Current Providers and Contact Information   Patient is his own legal guardian.     Primary Care Provider: No  Psychiatrist: No  Therapist: No  : Yes, He has a new luca but his old  is someone who's name he forgot  CTSS or ARMHS: Yes, He has a weird name so he doesn't know the name  ACT Team: No  Other: No    Has an DIPAK been signed? Yes ; For Gregoria Howell; By: Patient Relationship to patient self    Clinical Summary and Recommendations    Clinical summary of assessment (include strengths, protective factors, community resources, and assessment of vulnerability/risk):   Patient reporting sx consistent with depression and loneliness and requesting assistance getting to Crisis residence.  Referral is being sent to Gregoria Howell.  Pt reportedly has OP Case management and ARMHS worker (provider names unknown by pt at the time of interview) and follow up with them is suggested as well.  Pt is able to access care at the ED and does so frequently, however treatment adherence and follow through in a community setting is limited.  Pt denies SI/SIB/HI    Diagnosis with F Codes:  Major Depressive Disorder, Moderate, recurrent F33.1    Disposition  Attending provider, Raj Hernandez MD consulted and  does  agree with recommended disposition which includes Other: Crisis residence at [facility]. Patient agrees with recommended level of care. Crisis Residence     Details of final disposition include: Other: Crisis residence. And follow up with existing OP team (CM and AHRMS worker)       If Discharging, what are follow up needs? DEC faxed referral to [facility] (Kishan [phone]/fax [phone]) Address: Address: [address] [place] [address]  Safety/after care plan provided to Patient by RN    Duration of assessment time: 1.50 hrs    CPT code(s) utilized: 729293, after 74 minutes, add on in increments of 30 minutes      RODO Chen

## 2021-10-03 NOTE — ED NOTES
Patient received as sign-out at change of shift.  Please see initial note for complete details.  26 year old male who presented to the ED with depression.  Awaiting DEC assessment  Acute events during this shift: DEC assessment complete. Accepted at [facility].  Transportation arranged.     Raj Dukes MD  10/03/21 0764

## 2021-10-03 NOTE — PROGRESS NOTES
Writer spoke with Gregoria Howell-Kishan De La Fuente ([phone]/fax [phone])   Address[place] [facility] [place] [address].   Patient is OK to discharge there once pt has NEG COVID result.    Kishan requests that ED contact him when test result is in and when pt is coming.

## 2021-12-15 ENCOUNTER — HOSPITAL ENCOUNTER (EMERGENCY)
Facility: CLINIC | Age: 26
Discharge: OTHER INSTITUTION | End: 2021-12-16
Attending: EMERGENCY MEDICINE | Admitting: EMERGENCY MEDICINE
Payer: COMMERCIAL

## 2021-12-15 DIAGNOSIS — R21 RASH: ICD-10-CM
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
## 2021-12-15 DIAGNOSIS — F32.A DEPRESSION, UNSPECIFIED DEPRESSION TYPE: ICD-10-CM

## 2021-12-15 PROCEDURE — 84484 ASSAY OF TROPONIN QUANT: CPT | Performed by: EMERGENCY MEDICINE

## 2021-12-15 PROCEDURE — 99285 EMERGENCY DEPT VISIT HI MDM: CPT | Mod: 25 | Performed by: EMERGENCY MEDICINE

## 2021-12-15 PROCEDURE — 80053 COMPREHEN METABOLIC PANEL: CPT | Performed by: EMERGENCY MEDICINE

## 2021-12-15 PROCEDURE — 36415 COLL VENOUS BLD VENIPUNCTURE: CPT | Performed by: EMERGENCY MEDICINE

## 2021-12-15 PROCEDURE — 93010 ELECTROCARDIOGRAM REPORT: CPT | Performed by: EMERGENCY MEDICINE

## 2021-12-15 PROCEDURE — 93005 ELECTROCARDIOGRAM TRACING: CPT | Performed by: EMERGENCY MEDICINE

## 2021-12-15 PROCEDURE — 85025 COMPLETE CBC W/AUTO DIFF WBC: CPT | Performed by: EMERGENCY MEDICINE

## 2021-12-16 ENCOUNTER — APPOINTMENT (OUTPATIENT)
Dept: GENERAL RADIOLOGY | Facility: CLINIC | Age: 26
End: 2021-12-16
Attending: EMERGENCY MEDICINE
Payer: COMMERCIAL

## 2021-12-16 VITALS
DIASTOLIC BLOOD PRESSURE: 81 MMHG | OXYGEN SATURATION: 99 % | HEART RATE: 67 BPM | TEMPERATURE: 97.5 F | RESPIRATION RATE: 16 BRPM | SYSTOLIC BLOOD PRESSURE: 133 MMHG

## 2021-12-16 LAB
ALBUMIN SERPL-MCNC: 3.5 G/DL (ref 3.4–5)
ALP SERPL-CCNC: 62 U/L (ref 40–150)
ALT SERPL W P-5'-P-CCNC: 55 U/L (ref 0–70)
ANION GAP SERPL CALCULATED.3IONS-SCNC: 4 MMOL/L (ref 3–14)
AST SERPL W P-5'-P-CCNC: 35 U/L (ref 0–45)
ATRIAL RATE - MUSE: 60 BPM
ATRIAL RATE - MUSE: 66 BPM
BASOPHILS # BLD AUTO: 0.1 10E3/UL (ref 0–0.2)
BASOPHILS NFR BLD AUTO: 1 %
BILIRUB SERPL-MCNC: 0.2 MG/DL (ref 0.2–1.3)
BUN SERPL-MCNC: 15 MG/DL (ref 7–30)
CALCIUM SERPL-MCNC: 8.3 MG/DL (ref 8.5–10.1)
CHLORIDE BLD-SCNC: 107 MMOL/L (ref 94–109)
CO2 SERPL-SCNC: 28 MMOL/L (ref 20–32)
CREAT SERPL-MCNC: 0.65 MG/DL (ref 0.66–1.25)
DIASTOLIC BLOOD PRESSURE - MUSE: NORMAL MMHG
DIASTOLIC BLOOD PRESSURE - MUSE: NORMAL MMHG
EOSINOPHIL # BLD AUTO: 0.8 10E3/UL (ref 0–0.7)
EOSINOPHIL NFR BLD AUTO: 9 %
ERYTHROCYTE [DISTWIDTH] IN BLOOD BY AUTOMATED COUNT: 13.2 % (ref 10–15)
GFR SERPL CREATININE-BSD FRML MDRD: >90 ML/MIN/1.73M2
GLUCOSE BLD-MCNC: 83 MG/DL (ref 70–99)
HCT VFR BLD AUTO: 45.4 % (ref 40–53)
HGB BLD-MCNC: 15.2 G/DL (ref 13.3–17.7)
IMM GRANULOCYTES # BLD: 0 10E3/UL
IMM GRANULOCYTES NFR BLD: 0 %
INTERPRETATION ECG - MUSE: NORMAL
INTERPRETATION ECG - MUSE: NORMAL
LYMPHOCYTES # BLD AUTO: 1.7 10E3/UL (ref 0.8–5.3)
LYMPHOCYTES NFR BLD AUTO: 20 %
MCH RBC QN AUTO: 29 PG (ref 26.5–33)
MCHC RBC AUTO-ENTMCNC: 33.5 G/DL (ref 31.5–36.5)
MCV RBC AUTO: 87 FL (ref 78–100)
MONOCYTES # BLD AUTO: 0.6 10E3/UL (ref 0–1.3)
MONOCYTES NFR BLD AUTO: 7 %
NEUTROPHILS # BLD AUTO: 5.2 10E3/UL (ref 1.6–8.3)
NEUTROPHILS NFR BLD AUTO: 63 %
NRBC # BLD AUTO: 0 10E3/UL
NRBC BLD AUTO-RTO: 0 /100
P AXIS - MUSE: 50 DEGREES
P AXIS - MUSE: 50 DEGREES
PLATELET # BLD AUTO: 206 10E3/UL (ref 150–450)
POTASSIUM BLD-SCNC: 4 MMOL/L (ref 3.4–5.3)
PR INTERVAL - MUSE: 136 MS
PR INTERVAL - MUSE: 138 MS
PROT SERPL-MCNC: 6.7 G/DL (ref 6.8–8.8)
QRS DURATION - MUSE: 80 MS
QRS DURATION - MUSE: 80 MS
QT - MUSE: 362 MS
QT - MUSE: 374 MS
QTC - MUSE: 374 MS
QTC - MUSE: 379 MS
R AXIS - MUSE: 6 DEGREES
R AXIS - MUSE: 8 DEGREES
RBC # BLD AUTO: 5.24 10E6/UL (ref 4.4–5.9)
SARS-COV-2 RNA RESP QL NAA+PROBE: NEGATIVE
SODIUM SERPL-SCNC: 139 MMOL/L (ref 133–144)
SYSTOLIC BLOOD PRESSURE - MUSE: NORMAL MMHG
SYSTOLIC BLOOD PRESSURE - MUSE: NORMAL MMHG
T AXIS - MUSE: 42 DEGREES
T AXIS - MUSE: 44 DEGREES
TROPONIN I SERPL HS-MCNC: 5 NG/L
TROPONIN I SERPL HS-MCNC: 6 NG/L
VENTRICULAR RATE- MUSE: 60 BPM
VENTRICULAR RATE- MUSE: 66 BPM
WBC # BLD AUTO: 8.4 10E3/UL (ref 4–11)

## 2021-12-16 PROCEDURE — 250N000013 HC RX MED GY IP 250 OP 250 PS 637: Performed by: EMERGENCY MEDICINE

## 2021-12-16 PROCEDURE — U0005 INFEC AGEN DETEC AMPLI PROBE: HCPCS | Performed by: EMERGENCY MEDICINE

## 2021-12-16 PROCEDURE — 36415 COLL VENOUS BLD VENIPUNCTURE: CPT | Performed by: EMERGENCY MEDICINE

## 2021-12-16 PROCEDURE — 71046 X-RAY EXAM CHEST 2 VIEWS: CPT

## 2021-12-16 PROCEDURE — C9803 HOPD COVID-19 SPEC COLLECT: HCPCS | Performed by: EMERGENCY MEDICINE

## 2021-12-16 PROCEDURE — 84484 ASSAY OF TROPONIN QUANT: CPT | Performed by: EMERGENCY MEDICINE

## 2021-12-16 PROCEDURE — 90791 PSYCH DIAGNOSTIC EVALUATION: CPT

## 2021-12-16 RX ORDER — ACETAMINOPHEN 325 MG/1
650 TABLET ORAL EVERY 4 HOURS PRN
Status: DISCONTINUED | OUTPATIENT
Start: 2021-12-16 | End: 2021-12-16 | Stop reason: HOSPADM

## 2021-12-16 RX ORDER — TRIAMCINOLONE ACETONIDE 0.25 MG/G
OINTMENT TOPICAL 2 TIMES DAILY
Qty: 454 G | Refills: 0 | Status: SHIPPED | OUTPATIENT
Start: 2021-12-16

## 2021-12-16 RX ADMIN — ACETAMINOPHEN 650 MG: 325 TABLET, FILM COATED ORAL at 07:38

## 2021-12-16 ASSESSMENT — ENCOUNTER SYMPTOMS
SEIZURES: 1
VOMITING: 0
NAUSEA: 0
CHEST TIGHTNESS: 1
COUGH: 0
SHORTNESS OF BREATH: 0
DIARRHEA: 0
FEVER: 0

## 2021-12-16 NOTE — ED NOTES
"12/15/2021  Ravindra Oro 1995     [facility] Crisis Assessment:    Started at: 00:58  Completed at: 01:11  Patient was assessed via virtually (AmWell cart or other teleconferencing device).  Patient Location: [facility] ED19    Chief Complaint and History of Presenting Problem:    Patient is a 26 year old -American male who presented to the ED by Self related to concerns for suicidal ideation.     Assessment and intervention involved meeting with pt, obtaining collateral from [facility] and Care Everywhere records, and employing crisis psychotherapy including: Establishing rapport, Active listening and Safety planning. Collateral information includes: N/A.     Biopsychosocial Background and Demographic Information    Pt resides in \"group housing\" and has been working off and on. He states that his work has not called him in to work for several weeks but he anticipates that he will restart work next week.     Mental Health History and Current Symptoms     Pt is diagnosed with schizoaffective disorder, PTSD, and MDD. Pt is not connected to community mental health services and supports, nor does he take medications at this time.    Mental Health History (prior psychiatric hospitalizations, civil commitments, programmatic care, etc): reports psychiatric hospitalization \"a long time ago\"  Family Mental and Chemical Health History: unknown    Current and Historic Psychotropic Medications: None  Medication Adherent: N/A  Recent medication changes? N/A    Relevant Medical Concerns  Patient identifies concerns with completing ADLs? Yes  Patient can ambulate independently? Yes  Other medical health concerns? No  History of concussion or TBI? No     Trauma History   Physical, Emotional, or Sexual abuse: No  Loss of a friend or family member to suicide: No  Other identified traumatic event or significant stressor: No    Substance Use History and Treatments  No substance abuse history reported.    Drug screen/BAL/Breathalyzer " "Completed? No  Results: N/A     History of Suicidal Ideation, Suicide Attempts, Non-Suicidal Self Injury, and Risk Formulation:   Details of Current Ideation, Attempt(s), Plan(s): reports increased suicidal thoughts with a plan to drink bleach at a local park- threw away the bleach instead  Risk factors:  difficulty accessing medical/mental health care and disengagement with or distrust of medical/mental health care.   Protective factors:  employment.  History and Prior Methods of Self-injury: None reported.  History of Suicide Attempts: None reported.    ESS-6  1.a. Over the past 2 weeks, have you had thoughts of killing yourself? Yes   1.b. Have you ever attempted to kill yourself and, if yes, when did this last happen? No  2. Recent or current suicide plan? Yes  3. Recent or current intent to act on ideation? No  4. Lifetime psychiatric hospitalization? Yes  5. Pattern of excessive substance use? No  6. Current irritability, agitation, or aggression? Yes  ESS-6 Score: 4      Other Risk Areas  Aggressive/assumptive/homicidal risk factors: No   Sexually inappropriate behavior? No      Vulnerability to sexual exploitation? No     Clinical Presentation and Current Symptoms   Pt presents with labile affect and irritable mood. He had moderate participation in the assessment process, answering most question appropriately. Pt was vague regarding suicidality (\"sort of\") and what stressors have triggered the increase in SI. Pt became irritable with LICSW at times, stating \"I hate humans and I hate you.\" He requests to go to a crisis residence to \"get treatment.\" Pt was not willing to discuss alternatives if a crisis residence wasn't an option. Pt does not present as an imminent risk of harm towards himself or others, nor does he meet inpatient criteria at this time.    Attention, Hyperactivity, and Impulsivity: No   Anxiety:No    Behavioral Difficulties: Yes: Agitation   Mood Symptoms: Yes: Increased irritability/agitation " and Thoughts of suicide/death    Appetite: No   Feeding and Eating: No  Interpersonal Functioning: No  Learning Disabilities/Cognitive/Developmental Disorders: No   General Cognitive Impairments: No  If yes, see completed Mini-Cog Assessment below.  Sleep: No   Psychosis: No    Trauma: No       Mental Status Exam:  Affect: Labile  Appearance: Appropriate   Attention Span/Concentration: Attentive    Eye Contact: Variable  Fund of Knowledge: Appropriate   Language /Speech Content: Fluent  Language /Speech Volume: Normal   Language /Speech Rate/Productions: Normal   Recent Memory: Intact  Remote Memory: Intact  Mood: Irritable   Orientation:   Person: Yes   Place: Yes  Time of Day: Yes   Date: Yes   Situation (Do they understand why they are here?): Yes   Psychomotor Behavior: Normal   Thought Content: Suicidal  Thought Form: Intact    Current Providers and Contact Information   Patient is his own legal guardian.     Primary Care Provider: No  Psychiatrist: No  Therapist: No  : No  CTSS or ARMHS: No  ACT Team: No  Other: No    Has an DIPAK been signed? Yes ; For: Crisis Residences; By: patient.     Clinical Summary and Recommendations    Clinical summary of assessment (include strengths, protective factors, community resources, and assessment of vulnerability/risk): Pt can communicate his needs and wants.      Diagnosis with F Codes:  Schizoaffective disorder, unspecified [F25.9]  Major depressive disorder, recurrent, unspecified [F33.9]  Post-traumatic stress disorder [F43.10]    Disposition  Attending provider, Dr. Govea consulted and does  agree with recommended disposition which includes Residential Treatment: Crisis Residence. Patient agrees with recommended level of care. Referrals to be made to [facility]     Details of final disposition include: Residential treatment: Crisis Residence. Placement TBD, as referrals are being made for crisis residence  placement       If Discharging, what are follow up needs?   Safety/after care plan provided to Patient by RN    Duration of assessment time: .50 hrs    CPT code(s) utilized: 68036, up to 74 minutes      SUNDEEP GuevaraSW

## 2021-12-16 NOTE — ED NOTES
[facility][phone][facility] to determine if they have a bed available for pt. They do available beds, however, they will review him in the morning, so ED staff is asked to call back then.

## 2021-12-16 NOTE — DISCHARGE INSTRUCTIONS
Please make an appointment to follow up with [facility] (phone: [phone]) next week. Return to the ER with any concerns.     Your cardiology appointment:    01/05/2022 Office Visit CARDIOLOGY David Pennington PA-C    [address]    [place] [address]    [phone] (Work)    [phone] (Fax)   Scheduled  At 3:50 pm        If I am feeling unsafe or I am in a crisis, I will:   Contact my established care providers   Call the National Suicide Prevention Lifeline: [phone]   Go to the nearest emergency room   Call 050     Warning signs that I or other people might notice when a crisis is developing for me: feelings of sadness, worthlessness, agitation, thoughts of wanting to end my life    Things I am able to do on my own to cope or help me feel better: do enjoyable activities like reading, listening to music, exercising    Things that I am able to do with others to cope or help me better: talk with supportive family and friends    Things I can use or do for distraction: read, watch TV/movies, exercise    Changes I can make to support my mental health and wellness: Get connected with mental health services like a therapist    People in my life that I can ask for help: family and friends    Your county has a mental health crisis team you can call 24/7: [facility]- [phone]    Other things that are important when I m in crisis: being in a safe environment    Additional resources and information: N/A

## 2021-12-16 NOTE — ED NOTES
Called [facility] to update pt ready with Rx at bedside and transportation called. Staff at [facility] stated bed not available until 12 noon. Pt and transportation notified of change of timing.

## 2021-12-16 NOTE — ED NOTES
[facility] contacted [facility] ([phone]) to determine if they had an available bed for pt. They indicated that they had one bed available. Staff requested that the pt call them for the initial interview, the ED RN give a medical report, and have a COVID test completed. [facility]'s [facility], providing her with [facility] phone number.

## 2021-12-16 NOTE — ED PROVIDER NOTES
"      [facility] EMERGENCY DEPARTMENT ([facility])    12/15/21    HW02      History     Chief Complaint   Patient presents with     Suicidal     pt said he became very depressed at work today, bought bleach and took it to the park with plan to drink it; did not drink any bleach, came here instead.     Chest Pain     intermittant CP, worse at night. pt says he was admitted to the hospital for this 3 weeks ago and dc'd with a heart monitor sticker but he took it off and did not f/u     The history is provided by the patient and medical records.     Ravindra Oro is a 26 year old male who has a past medical history of schizoaffective disorder, untreated, not currently on any medication, presents to the ER with suicidal thoughts tonight. He states he has been feeling suicidal for a while. He states tonight he bought a bottle of bleach and walked to the park with intent on drinking it to kill himself. He states he did not drink it or do anything to hurt himself though, decided to come here instead. He also notes that this afternoon while walking to the train station he passed out. However, he states that he recalls the entire event, does not feel he completely lost consciousness. He states that he, \"had a seizure.\" He describes that he felt like it was coming on, recalls falling to the ground and shaking, feeling, \"out of it,\" for about 5 minutes. Again, though, he states that he was never completely asleep or unconscious. He denies any trauma associated with that, states he did not strike his head, has no notable neck or back pain, pain anywhere else. He does report that he had a little bit of chest tightness prior to the that happening. He did have a similar episode last month, had an admission at outside hospital. He did have an echo which showed an echo lucency present in the muscular portion of ventricular septum. They mention that a cardiac MRI or other imaging modality could be recommended for further " characterization. Patient was set up with a Zio patch, but states that he took it off. He does have a cardiology appointment set up in January for follow-up on this. He denies any fever, cough, shortness of breath, nausea, vomiting, or diarrhea. He states he does not use alcohol, drugs, or any other substances. He also states that he has had diffuse body itching for several weeks.     This part of the medical record was transcribed by Lulu Camarena Medical Scribe, from a dictation done by Portia Govea MD.       Past Medical History  Past Medical History:   Diagnosis Date     Depressive disorder      Psychosis (H)      Schizoaffective disorder (H)      History reviewed. No pertinent surgical history.  triamcinolone (KENALOG) 0.025 % external ointment  acetaminophen (TYLENOL) 325 MG tablet  acetaminophen (TYLENOL) 500 MG tablet  cholecalciferol (VITAMIN D) 1000 UNIT tablet  cyanocobalamin (VITAMIN B-12) 1000 MCG tablet  Fluocinolone Acetonide (DERMA-SMOOTHE/FS SCALP) 0.01 % OIL  FLUoxetine (PROZAC) 10 MG capsule  FLUoxetine (PROZAC) 20 MG capsule  fluticasone (FLONASE) 50 MCG/ACT nasal spray  ibuprofen (ADVIL/MOTRIN) 800 MG tablet  ketoconazole (NIZORAL) 2 % shampoo  mirtazapine (REMERON) 30 MG tablet  OLANZapine (ZYPREXA) 5 MG tablet  OLANZapine zydis (ZYPREXA) 5 MG ODT      No Known Allergies  Family History  Family History   Problem Relation Age of Onset     Cancer No family hx of         No family history of skin cancer     Melanoma No family hx of      Skin Cancer No family hx of      Social History   Social History     Tobacco Use     Smoking status: Never Smoker     Smokeless tobacco: Never Used   Substance Use Topics     Alcohol use: Never     Drug use: Not Currently     Types: Marijuana      Past medical history, past surgical history, medications, allergies, family history, and social history were reviewed with the patient. No additional pertinent items.       Review of Systems   Constitutional:  Negative for fever.   Respiratory: Positive for chest tightness. Negative for cough and shortness of breath.    Gastrointestinal: Negative for diarrhea, nausea and vomiting.   Neurological: Positive for seizures.   Psychiatric/Behavioral: Positive for suicidal ideas.   All other systems reviewed and are negative.    A complete review of systems was performed with pertinent positives and negatives noted in the HPI, and all other systems negative.    Physical Exam   BP: 105/67  Pulse: 83  Temp: 97.5  F (36.4  C)  Resp: 16  SpO2: 96 %  Physical Exam  Constitutional:       General: He is not in acute distress.     Appearance: He is not diaphoretic.   HENT:      Head: Atraumatic.   Eyes:      General: No scleral icterus.     Pupils: Pupils are equal, round, and reactive to light.   Neck:      Comments: No midline C/T/ L-spine tenderness  Cardiovascular:      Heart sounds: Normal heart sounds.   Pulmonary:      Effort: No respiratory distress.      Breath sounds: Normal breath sounds.   Abdominal:      Palpations: Abdomen is soft.      Tenderness: There is no abdominal tenderness.   Musculoskeletal:         General: No tenderness.   Skin:     General: Skin is warm.      Findings: Rash (papular/scally rash on extremities with some excoriation) present.   Neurological:      General: No focal deficit present.      Sensory: No sensory deficit.      Motor: No weakness.         ED Course      Procedures            EKG Interpretation:      Interpreted by Portia Govea MD  Time reviewed: 0000  Symptoms at time of EKG: none   Rhythm: normal sinus   Rate: normal  Axis: normal  Ectopy: none  Conduction: normal  ST Segments/ T Waves: No ST-T wave changes  Q Waves: none  Comparison to prior:  Clinical Impression: normal EKG        Mental Health Risk Assessment      PSS-3    Date and Time Over the past 2 weeks have you felt down, depressed, or hopeless? Over the past 2 weeks have you had thoughts of killing yourself? Have you  ever attempted to kill yourself? When did this last happen? User   12/15/21 2255 yes yes no -- CANDY      C-SSRS ([place])    Date and Time Q1 Wished to be Dead (Past Month) Q2 Suicidal Thoughts (Past Month) Q3 Suicidal Thought Method Q4 Suicidal Intent without Specific Plan Q5 Suicide Intent with Specific Plan Q6 Suicide Behavior (Lifetime) Within the Past 3 Months? RETIRED: Level of Risk per Screen Screening Not Complete User   12/16/21 0006 yes yes yes no -- yes -- -- -- LN   12/15/21 2255 yes yes yes no no no -- -- -- MEK              Suicide assessment completed by mental health (D.E.C., LCSW, etc.)       Results for orders placed or performed during the hospital encounter of 12/15/21   XR Chest 2 Views     Status: None    Narrative    EXAM: XR CHEST 2 VW  LOCATION: [facility]  DATE/TIME: 12/16/2021 12:02 AM    INDICATION: chest pain  COMPARISON: None.      Impression    IMPRESSION: Cardiomediastinal silhouette within normal limits. No focal consolidation or pleural effusion. Thoracic dextrocurvature.   Comprehensive metabolic panel     Status: Abnormal   Result Value Ref Range    Sodium 139 133 - 144 mmol/L    Potassium 4.0 3.4 - 5.3 mmol/L    Chloride 107 94 - 109 mmol/L    Carbon Dioxide (CO2) 28 20 - 32 mmol/L    Anion Gap 4 3 - 14 mmol/L    Urea Nitrogen 15 7 - 30 mg/dL    Creatinine 0.65 (L) 0.66 - 1.25 mg/dL    Calcium 8.3 (L) 8.5 - 10.1 mg/dL    Glucose 83 70 - 99 mg/dL    Alkaline Phosphatase 62 40 - 150 U/L    AST 35 0 - 45 U/L    ALT 55 0 - 70 U/L    Protein Total 6.7 (L) 6.8 - 8.8 g/dL    Albumin 3.5 3.4 - 5.0 g/dL    Bilirubin Total 0.2 0.2 - 1.3 mg/dL    GFR Estimate >90 >60 mL/min/1.73m2   Troponin I     Status: Normal   Result Value Ref Range    Troponin I High Sensitivity 6 <79 ng/L   CBC with platelets and differential     Status: Abnormal   Result Value Ref Range    WBC Count 8.4 4.0 - 11.0 10e3/uL    RBC Count 5.24 4.40 - 5.90 10e6/uL     Hemoglobin 15.2 13.3 - 17.7 g/dL    Hematocrit 45.4 40.0 - 53.0 %    MCV 87 78 - 100 fL    MCH 29.0 26.5 - 33.0 pg    MCHC 33.5 31.5 - 36.5 g/dL    RDW 13.2 10.0 - 15.0 %    Platelet Count 206 150 - 450 10e3/uL    % Neutrophils 63 %    % Lymphocytes 20 %    % Monocytes 7 %    % Eosinophils 9 %    % Basophils 1 %    % Immature Granulocytes 0 %    NRBCs per 100 WBC 0 <1 /100    Absolute Neutrophils 5.2 1.6 - 8.3 10e3/uL    Absolute Lymphocytes 1.7 0.8 - 5.3 10e3/uL    Absolute Monocytes 0.6 0.0 - 1.3 10e3/uL    Absolute Eosinophils 0.8 (H) 0.0 - 0.7 10e3/uL    Absolute Basophils 0.1 0.0 - 0.2 10e3/uL    Absolute Immature Granulocytes 0.0 <=0.4 10e3/uL    Absolute NRBCs 0.0 10e3/uL   Asymptomatic COVID-19 Virus (Coronavirus) by PCR Nasopharyngeal     Status: Normal    Specimen: Nasopharyngeal; Swab   Result Value Ref Range    SARS CoV2 PCR Negative Negative    Narrative    Testing was performed using the faraz  SARS-CoV-2 & Influenza A/B Assay on the faraz  [employer]  System.  This test should be ordered for the detection of SARS-COV-2 in individuals who meet SARS-CoV-2 clinical and/or epidemiological criteria. Test performance is unknown in asymptomatic patients.  This test is for in vitro diagnostic use under the FDA EUA for laboratories certified under CLIA to perform moderate and/or high complexity testing. This test has not been FDA cleared or approved.  A negative test does not rule out the presence of PCR inhibitors in the specimen or target RNA in concentration below the limit of detection for the assay. The possibility of a false negative should be considered if the patient's recent exposure or clinical presentation suggests COVID-19.  [facility] are certified under the Clinical Laboratory Improvement Amendments of 1988 (CLIA-88) as qualified to perform moderate and/or high complexity laboratory testing.   Troponin I     Status: Normal   Result Value Ref Range    Troponin I High Sensitivity 5  <79 ng/L   CBC with platelets differential     Status: Abnormal    Narrative    The following orders were created for panel order CBC with platelets differential.  Procedure                               Abnormality         Status                     ---------                               -----------         ------                     CBC with platelets and d...[[id number]]  Abnormal            Final result                 Please view results for these tests on the individual orders.     Medications - No data to display     Assessments & Plan (with Medical Decision Making)   EKG was unremarkable.  Troponin x2 are negative.  Chest x-ray unremarkable.  He does have a follow-up appointment scheduled cardiology at the beginning of January.  He is encouraged to keep this appointment.  There was a vague abnormality noted on his previous echo, and perhaps further cardiac imaging may be warranted at some point, we will defer this to cardiology.  He initially reported he passed out, then told me that he felt like he had a seizure-though states that he never fully lost consciousness, was awake the whole time.  Denies injuries.  No ongoing chest discomfort does not sound like this represented either a true syncopal event nor a seizure. Malignant arrhythmia not likely. No ongoing sx to suggest PE.  He additionally does have a faint papular/scaly rash that looks suspicious for atopic dermatitis.  I will treat him with topical triamcinolone.  He did have a mental health evaluation.  He can contract for safety at this time.  He was interested in crisis housing, and a crisis bed was found for him at [facility].  He will be discharged this morning to [facility].  I did additionally place a referral for primary care.      Dictation Disclaimer: Some of this Note has been completed with voice-recognition dictation software. Although errors are generally corrected real-time, there is the potential for a rare error to be present in  the completed chart.      I have reviewed the nursing notes. I have reviewed the findings, diagnosis, plan and need for follow up with the patient.    New Prescriptions    TRIAMCINOLONE (KENALOG) 0.025 % EXTERNAL OINTMENT    Apply topically 2 times daily       Final diagnoses:   Rash   Depression, unspecified depression type   Fall, initial encounter       --  Portia Govea MD  [facility] EMERGENCY DEPARTMENT  12/15/2021     Portia Govea MD  12/16/21 0701

## 2021-12-16 NOTE — ED NOTES
This RN dropped off pt script at outpatient pharmacy to be filled prior to transport to [facility].

## 2021-12-21 ENCOUNTER — HOSPITAL ENCOUNTER (EMERGENCY)
Facility: CLINIC | Age: 26
Discharge: HOME | End: 2021-12-21
Attending: EMERGENCY MEDICINE | Admitting: EMERGENCY MEDICINE
Payer: COMMERCIAL

## 2021-12-21 VITALS
SYSTOLIC BLOOD PRESSURE: 105 MMHG | HEART RATE: 78 BPM | DIASTOLIC BLOOD PRESSURE: 74 MMHG | TEMPERATURE: 97.7 F | OXYGEN SATURATION: 100 % | RESPIRATION RATE: 18 BRPM

## 2021-12-21 DIAGNOSIS — L08.9 SOFT TISSUE INFECTION: ICD-10-CM

## 2021-12-21 PROCEDURE — 99284 EMERGENCY DEPT VISIT MOD MDM: CPT | Performed by: EMERGENCY MEDICINE

## 2021-12-21 PROCEDURE — 250N000013 HC RX MED GY IP 250 OP 250 PS 637: Performed by: EMERGENCY MEDICINE

## 2021-12-21 PROCEDURE — 99283 EMERGENCY DEPT VISIT LOW MDM: CPT | Performed by: EMERGENCY MEDICINE

## 2021-12-21 RX ORDER — CLINDAMYCIN HCL 300 MG
300 CAPSULE ORAL ONCE
Status: COMPLETED | OUTPATIENT
Start: 2021-12-21 | End: 2021-12-21

## 2021-12-21 RX ORDER — CLINDAMYCIN HCL 300 MG
300 CAPSULE ORAL 4 TIMES DAILY
Qty: 40 CAPSULE | Refills: 0 | Status: SHIPPED | OUTPATIENT
Start: 2021-12-21 | End: 2021-12-28

## 2021-12-21 RX ADMIN — CLINDAMYCIN HYDROCHLORIDE 300 MG: 300 CAPSULE ORAL at 21:55

## 2021-12-22 ENCOUNTER — HOSPITAL ENCOUNTER (EMERGENCY)
Facility: CLINIC | Age: 26
Discharge: HOME | End: 2021-12-22
Attending: EMERGENCY MEDICINE | Admitting: EMERGENCY MEDICINE
Payer: COMMERCIAL

## 2021-12-22 VITALS
TEMPERATURE: 97.5 F | OXYGEN SATURATION: 98 % | RESPIRATION RATE: 20 BRPM | HEART RATE: 67 BPM | WEIGHT: 132 LBS | BODY MASS INDEX: 45.83 KG/M2 | DIASTOLIC BLOOD PRESSURE: 69 MMHG | SYSTOLIC BLOOD PRESSURE: 113 MMHG

## 2021-12-22 DIAGNOSIS — F25.0 SCHIZOAFFECTIVE DISORDER, BIPOLAR TYPE (H): ICD-10-CM

## 2021-12-22 DIAGNOSIS — F25.1 SCHIZOAFFECTIVE DISORDER, DEPRESSIVE TYPE (H): ICD-10-CM

## 2021-12-22 DIAGNOSIS — F32.9 MAJOR DEPRESSIVE DISORDER, SINGLE EPISODE, UNSPECIFIED: ICD-10-CM

## 2021-12-22 DIAGNOSIS — F41.9 ANXIETY: ICD-10-CM

## 2021-12-22 PROCEDURE — 90791 PSYCH DIAGNOSTIC EVALUATION: CPT

## 2021-12-22 PROCEDURE — 99285 EMERGENCY DEPT VISIT HI MDM: CPT | Mod: 25

## 2021-12-22 PROCEDURE — 99283 EMERGENCY DEPT VISIT LOW MDM: CPT | Performed by: EMERGENCY MEDICINE

## 2021-12-22 ASSESSMENT — ENCOUNTER SYMPTOMS
DYSPHORIC MOOD: 0
HALLUCINATIONS: 0

## 2021-12-22 NOTE — DISCHARGE INSTRUCTIONS
Thank you for choosing [facility] for your care. It was a pleasure taking care of you today in our Emergency Department.     Please follow up with your primary care provider in the next 3-5 days. However, if you have continued worsening symptoms, please return to the emergency department.

## 2021-12-22 NOTE — ED PROVIDER NOTES
ED Provider Note  [facility]      History     Chief Complaint   Patient presents with     Dental Pain     HPI  Patient with history of schizoaffective disorder, not currently on medication. He presents with left lower dental pain that has been constant today. Pain radiates into the left jaw, behind the left ear. He endorses sore throat, but no difficulty swallowing. He took Tylenol without improvement. He states that it is worsening, his hair is getting curlier, and his ears are getting bigger. No fever, chills, or other physical complaints.     I, Olinda Espinoza, am serving as a trained medical scribe to document services personally performed by Sahara Loo MD, based on the provider's statements to me.     I, Sahara Loo MD, was physically present and have reviewed and verified the accuracy of this note documented by Olinda Espinoza.    Past Medical History  Past Medical History:   Diagnosis Date     Depressive disorder      Psychosis (H)      Schizoaffective disorder (H)      No past surgical history on file.  acetaminophen (TYLENOL) 325 MG tablet  acetaminophen (TYLENOL) 500 MG tablet  cholecalciferol (VITAMIN D) 1000 UNIT tablet  cyanocobalamin (VITAMIN B-12) 1000 MCG tablet  Fluocinolone Acetonide (DERMA-SMOOTHE/FS SCALP) 0.01 % OIL  FLUoxetine (PROZAC) 10 MG capsule  FLUoxetine (PROZAC) 20 MG capsule  fluticasone (FLONASE) 50 MCG/ACT nasal spray  ibuprofen (ADVIL/MOTRIN) 800 MG tablet  ketoconazole (NIZORAL) 2 % shampoo  mirtazapine (REMERON) 30 MG tablet  OLANZapine (ZYPREXA) 5 MG tablet  OLANZapine zydis (ZYPREXA) 5 MG ODT  triamcinolone (KENALOG) 0.025 % external ointment      No Known Allergies  Family History  Family History   Problem Relation Age of Onset     Cancer No family hx of         No family history of skin cancer     Melanoma No family hx of      Skin Cancer No family hx of      Social History   Social History     Tobacco Use     Smoking status: Never  Smoker     Smokeless tobacco: Never Used   Substance Use Topics     Alcohol use: Never     Drug use: Not Currently     Types: Marijuana      Past medical history, past surgical history, medications, allergies, family history, and social history were reviewed with the patient. No additional pertinent items.       Review of Systems  A complete review of systems was performed with pertinent positives and negatives noted in the HPI, and all other systems negative.    Physical Exam   BP: 105/74  Pulse: 78  Temp: 97.7  F (36.5  C)  Resp: 18  SpO2: 100 %  Physical Exam  General: sitting comfortably on the bed in no acute distress   Neuro: awake alert; moving all extremities face symetrical   Eyes: Eyelids are non swollen conjunctiva clear   Mouth: left lower molar is tender to palpation - no gingival erythema or swelling; no floor of mouth swelling   Throat:  uvula is midline without edema , no erythema or exudates  Neck: no LAD   Cardiovascular: RRR       ED Course       Nursing note were reviewed. Past Medical records were reviewed. Patient does  have tenderness to palpation concerned tooth, suggesting a dental abscess. Offered a dental block which patient declined. Patient's pain controlled during his stay.  VSS in the department. Recommended patient follow up with detist this week. Discussed risks and benefits of clindamycin to which patient agreed. and advised to also take ibuprofen.   Patient discharged in good condition with questions answered. He was given [facility] discharge instructions and follow-up recommendations. Patient will return to the ED if symptoms worsen or he develops any of the concerning signs/symptoms as outlined in the EPIC d/c instructions. Otherwise he will follow up in clinic as recommended in the d/c instructions.       No results found for any visits on 12/21/21.  Medications   clindamycin (CLEOCIN) capsule 300 mg (300 mg Oral Given 12/21/21 1308)        Assessments & Plan (with Medical Decision  Making)   (L08.9) Soft tissue infection  Discharge to home with close follow up in primary care clinic  Return to the ER with any worsening symptoms        I have reviewed the nursing notes. I have reviewed the findings, diagnosis, plan and need for follow up with the patient.    Discharge Medication List as of 12/21/2021  9:53 PM      START taking these medications    Details   clindamycin (CLEOCIN) 300 MG capsule Take 1 capsule (300 mg) by mouth 4 times daily for 7 days, Disp-40 capsule, R-0, Local Print             Final diagnoses:   Soft tissue infection       --    [facility] EMERGENCY DEPARTMENT  12/21/2021     Sahara Loo MD  01/13/22 8808

## 2021-12-22 NOTE — ED TRIAGE NOTES
Pt complaining of upper and lower left back tooth pain.  Pt was seen at [facility] and states he didn't stay because of the wait and the meds they gave him didn't work for the pain.

## 2021-12-23 NOTE — ED NOTES
One-to-one cleared. The patient continues to complain of dental pain. He was seen here yesterday for the same. He picked up the clindamycin prescription this morning and has only taken 1 dose. He does not have any significant intraoral/gingival swelling on exam. He does have some tenderness of his upper molars. No indication for emergent dental consultation. He should finish his course of clindamycin and follow-up with dentistry.     Raj Dukes MD  12/22/21 2013

## 2021-12-23 NOTE — ED PROVIDER NOTES
ED Provider Note  [facility]      History     Chief Complaint   Patient presents with     Suicidal     Pt reports he wants to jump into [place]      Dental Pain     upper bilateral molar pain     HPI  Bedasso RICHARD Oro is a 26 year old male with schizoaffective disorder who presents to the ED saying he has been dealing with a tooth issue and this made him feel suicidal.  He feels better now but feels that he would benefit from staying at a crisis shelter.  He was given an antibiotic prescription yesterday for his tooth and took one dose so far.  He was seen in the ED today and told to continue his antibiotic.  He says he is not taking his meds currently.  He has a provider at [facility] but hasn't bothered to see them again.  He has a residence on the [place].      Past Medical History  Past Medical History:   Diagnosis Date     Depressive disorder      Psychosis (H)      Schizoaffective disorder (H)      History reviewed. No pertinent surgical history.  clindamycin (CLEOCIN) 300 MG capsule  acetaminophen (TYLENOL) 325 MG tablet  acetaminophen (TYLENOL) 500 MG tablet  cholecalciferol (VITAMIN D) 1000 UNIT tablet  cyanocobalamin (VITAMIN B-12) 1000 MCG tablet  Fluocinolone Acetonide (DERMA-SMOOTHE/FS SCALP) 0.01 % OIL  FLUoxetine (PROZAC) 10 MG capsule  FLUoxetine (PROZAC) 20 MG capsule  fluticasone (FLONASE) 50 MCG/ACT nasal spray  ibuprofen (ADVIL/MOTRIN) 800 MG tablet  ketoconazole (NIZORAL) 2 % shampoo  mirtazapine (REMERON) 30 MG tablet  OLANZapine (ZYPREXA) 5 MG tablet  OLANZapine zydis (ZYPREXA) 5 MG ODT  triamcinolone (KENALOG) 0.025 % external ointment      No Known Allergies  Family History  Family History   Problem Relation Age of Onset     Cancer No family hx of         No family history of skin cancer     Melanoma No family hx of      Skin Cancer No family hx of      Social History   Social History     Tobacco Use     Smoking status: Never Smoker     Smokeless  tobacco: Never Used   Substance Use Topics     Alcohol use: Never     Drug use: Not Currently     Types: Marijuana      Past medical history, past surgical history, medications, allergies, family history, and social history were reviewed with the patient. No additional pertinent items.       Review of Systems   Psychiatric/Behavioral: Negative for dysphoric mood, hallucinations, self-injury and suicidal ideas (earlier today.  has had some this week. ).   All other systems reviewed and are negative.    A complete review of systems was performed with pertinent positives and negatives noted in the HPI, and all other systems negative.    Physical Exam   BP: 104/66  Pulse: 82  Temp: 97.5  F (36.4  C)  Resp: 20  Weight: 59.9 kg (132 lb)  SpO2: 99 %  Physical Exam  Vitals and nursing note reviewed.   HENT:      Head: Normocephalic and atraumatic.      Nose: No congestion or rhinorrhea.   Eyes:      Extraocular Movements: Extraocular movements intact.   Cardiovascular:      Rate and Rhythm: Normal rate.   Pulmonary:      Effort: Pulmonary effort is normal.   Musculoskeletal:         General: Normal range of motion.      Cervical back: Normal range of motion.   Skin:     General: Skin is warm and dry.   Neurological:      General: No focal deficit present.      Mental Status: He is alert and oriented to person, place, and time.   Psychiatric:         Attention and Perception: Attention and perception normal.         Mood and Affect: Mood and affect normal.         Speech: Speech normal.         Behavior: Behavior normal. Behavior is cooperative.         Thought Content: Thought content normal. Thought content does not include suicidal ideation.         Cognition and Memory: Cognition and memory normal.         Judgment: Judgment normal.         ED Course      Procedures       The medical record was reviewed and interpreted.  Current labs reviewed and interpreted.  Mental Health Risk Assessment      PSS-3    Date and Time  Over the past 2 weeks have you felt down, depressed, or hopeless? Over the past 2 weeks have you had thoughts of killing yourself? Have you ever attempted to kill yourself? When did this last happen? User   12/22/21 1918 yes yes no -- TL      C-SSRS ([place])    Date and Time Q1 Wished to be Dead (Past Month) Q2 Suicidal Thoughts (Past Month) Q3 Suicidal Thought Method Q4 Suicidal Intent without Specific Plan Q5 Suicide Intent with Specific Plan Q6 Suicide Behavior (Lifetime) Within the Past 3 Months? RETIRED: Level of Risk per Screen Screening Not Complete User   12/22/21 1918 yes yes yes no yes no -- -- -- TL              Suicide assessment completed by mental health (D.E.C., LCSW, etc.)       No results found for any visits on 12/22/21.  Medications - No data to display     Assessments & Plan (with Medical Decision Making)     Ravindra Oro is a 26 year old male with schizoaffective disorder who presents to the ED saying he has been dealing with a tooth issue and this made him feel suicidal. He is feeling better now but wonders if he could stay at a crisis shelter.  He denies si or plan.  The local crisis shelters were all called and there are no beds available. He was given their numbers. He can contract for safety. He also has hx of questionable malingering.  He was discharged with resources.       I have reviewed the nursing notes. I have reviewed the findings, diagnosis, plan and need for follow up with the patient.    New Prescriptions    No medications on file       Final diagnoses:   Schizoaffective disorder, bipolar type (H)       --  Carmel De Souza  [facility] EMERGENCY DEPARTMENT  12/22/2021     Carmel De Souza MD  12/22/21 0243

## 2021-12-23 NOTE — DISCHARGE INSTRUCTIONS
"If I am feeling unsafe or I am in a crisis, I will:  Contact my established care providers   Call the National Suicide Prevention Lifeline: 179.837.1481   Go to the nearest emergency room   Call 917          Warning signs that I or other people might notice when a crisis is developing for me: I am not willing to wait for services in the emergency room and leave before being seen. I don't tell anyone i'm leaving when I do leave.    Things I am able to do on my own to cope or help me feel better: Follow through on my emergency room visits and get seen for services I went there for.    Things that I am able to do with others to cope or help me better: Find stable shelter and housing and maintain it if at all possible.    Changes I can make to support my mental health and wellness: Follow through on appointments. Be patient and wait for services to be provided. Use [facility] to acquire a primary doctor.    People in my life that I can ask for help: My [facility] providers    Your [facility] has a mental health crisis team you can call 24/7: [facility] Adult Crisis.:  [phone]    Other things that are important when I m in crisis: I have resources that will help if I will be patient.    Additional resources and information:     Crisis Lines  Crisis Text Line  Text 933258  You will be connected with a trained live crisis counselor to provide support.     National Hope Line  1.800.SUICIDE [5895825]        Community Resources  Fast Tracker  Linking people to mental health and substance use disorder resources  fasttrackermn.org      [place] Mental Health Warm Line  Peer to peer support  Monday thru Saturday, 12 pm to 10 pm  901.227.4935 or 3.191.547.1169  Text \"Support\" to 24834     National [place] on Mental Illness (CARLA)  208.602.3931 or 1.888.CARLA.HELPS           Mental Health Apps  My3  https://myWhipTail.org/     Think2eBox  " https://Muzui.org/apps/virtual-hope-box/

## 2021-12-23 NOTE — ED NOTES
12/22/2021  Ravindra Oro 1995     [facility] Crisis Assessment    Patient was assessed: in person  Patient location: [facility]    Referral Data and Chief Complaint  Ravindra Oro is a 26 year old who uses he/him pronouns. Patient presented to the ED alone and was referred to the ED by self. Patient is presenting to the ED for the following concerns: Dental pain, suicidal ideation.      Informed Consent and Assessment Methods    Patient is his own guardian. Writer met with patient and explained the crisis assessment process, including applicable information disclosures and limits to confidentiality, assessed understanding of the process, and obtained consent to proceed with the assessment. Patient was observed to be able to participate in the assessment as evidenced by participation. Assessment methods included conducting a formal interview with patient, review of medical records, collaboration with medical staff, and obtaining relevant collateral information from family and community providers when available.    Narrative Summary of Presenting Problem and Current Functioning  What led to the patient presenting for crisis services, factors that make the crisis life threatening or complex, stressors, how is this disrupting the patient's life, and how current functioning is in comparison to baseline. How is patient presenting during the assessment.     Patient presented to the [facility] ED for dental pain and suicidal thoughts. Patient has an oral issue causing him discomfort. He was seen yesterday at [facility] for the same issue. Patient has been given a prescription of Clindamycin to treat his oral swelling and infection but has only taken one dose. Patient alluded to feeling suicidal but when asked said it is related currently to his dental discomfort and has no plan or intent, he is just frustrated. Patient is currently homeless but stated he has housing currently in [facility] for the past month. Patient presents in  the ED as conversational, calm, organized, oriented to place and time, and open to suggestions for self help.  History of the Crisis  Duration of the current crisis, coping skills attempted to reduce the crisis, community resources used, and past presentations.    Patient [place] [facility] [place] same dental issue presented today in ED. Patient has been given a prescription of Clindamycin to treat his oral swelling and infection but has only taken one dose. Patient complains about his entire body hurting. Recommendation is to follow through with dentistry when he can, and he said that in the past he has utilized [facility] at [address] [place] [address] For previous dental issues. Patient states that he has current housing on the [place] side for the past month after moving out of an apartment prior to that. Patient was recently staying at [facility], as discovered when calling to find his a crisis bed, and when contacted they said they could not take him back due to not being a good fit and his needing a private room. Patient has a history of seeking medical resources and leaving unannounced prior to finalization of visit. Records show several instances where patient eloped prior to completion of the ED visit. Patient stated that has felt suicidal many times and has so recently, but stated that had no plan or means currently. When asked, patient stated that he has not attempted suicide, but has ideation often. Patient has previous diagnosis of Severe recurrent major depressive disorder with psychotic features, delusional disorder, Major Depressive Disorder, and Schizoaffective Disorder by history. Patient was provided a list of references for crisis shelter options. Numbers called by  were unproductive tonight for availability. Given that patient stated he has housing currently, and has resources to follow through on, patient is recommended for discharge.        Collateral  Information  None.    Risk Assessment    Risk of Harm to Self     ESS-6  1.a. Over the past 2 weeks, have you had thoughts of killing yourself? Yes  1.b. Have you ever attempted to kill yourself and, if yes, when did this last happen? No   2. Recent or current suicide plan? No.  3. Recent or current intent to act on ideation? No  4. Lifetime psychiatric hospitalization? No  5. Pattern of excessive substance use? No  6. Current irritability, agitation, or aggression? No  Scoring note: BOTH 1a and 1b must be yes for it to score 1 point, if both are not yes it is zero. All others are 1 point per number. If all questions 1a/1b - 6 are no, risk is negligible. If one of 1a/1b is yes, then risk is mild. If either question 2 or 3, but not both, is yes, then risk is automatically moderate regardless of total score. If both 2 and 3 are yes, risk is automatically high regardless of total score.     Score: 1, moderate risk    The patient has the following risk factors for suicide: depressive symptoms, chronic pain, poor decision making and poor impulse control    Is the patient experiencing current suicidal ideation: Yes. Thoughts to kill self with no plan or intent    Is the patient engaging in preparatory suicide behaviors (formulating how to act on plan, giving away possessions, saying goodbye, displaying dramatic behavior changes, etc)? No    Does the patient have access to firearms or other lethal means? no    The patient has the following protective factors: social support, voluntarily seeking mental health support and established relationship community mental health provider(s)    Support system information: Patient has Health Insurance through [facility].    Patient strengths: Patient knows the system of crisis shelters and appears resilient    Does the patient engage in non-suicidal self-injurious behavior (NSSI/SIB)? no    Is the patient vulnerable to sexual exploitation?  No    Is the patient  experiencing abuse or neglect? no    Is the patient a vulnerable adult? Yes      Risk of Harm to Others  The patient has the following risk factors of harm to others: no risk factors identified    Does the patient have thoughts of harming others? No    Is the patient engaging in sexually inappropriate behavior?  no       Current Substance Abuse    Is there recent substance abuse? no    Was a urine drug screen or blood alcohol level obtained: No    CAGE AID  Have you felt you ought to cut down on your drinking or drug use?  No  Have people annoyed you by criticizing your drinking or drug use? No  Have you felt bad or guilty about your drinking or drug use? No  Have you ever had a drink or used drugs first thing in the morning to steady your nerves or to get rid of a hangover? No  Score: 0/4       Current Symptoms/Concerns    Symptoms  Attention, hyperactivity, and impulsivity symptoms present: No    Anxiety symptoms present: No      Appetite symptoms present: No     Behavioral difficulties present: No     Cognitive impairment symptoms present: No    Depressive symptoms present: Yes Depressed mood, Feelings of hopelessness , Impaired decision making  and Thoughts of suicide/death      Eating disorder symptoms present: No    Learning disabilities, cognitive challenges, and/or developmental disorder symptoms present: No     Manic/hypomanic symptoms present: No    Personality and interpersonal functioning difficulties present : No    Psychosis symptoms present: No      Sleep difficulties present: No    Substance abuse disorder symptoms present: No     Trauma and stressor related symptoms present: Yes: Negative Cognitions/Mood: Persistent negative beliefs about oneself, others, or the world and Persistent negative emotional state (e.g., fear, anger, shame)           Mental Status Exam   Affect: Flat   Appearance: Disheveled    Attention Span/Concentration: Attentive?    Eye Contact: Engaged   Fund of Knowledge:  Appropriate    Language /Speech Content: Fluent   Language /Speech Volume: Normal    Language /Speech Rate/Productions: Normal    Recent Memory: Intact   Remote Memory: Intact   Mood: Normal    Orientation to Person: Yes    Orientation to Place: Yes   Orientation to Time of Day: Yes    Orientation to Date: Yes    Situation (Do they understand why they are here?): Yes    Psychomotor Behavior: Normal    Thought Content: Clear   Thought Form: Intact       Mental Health and Substance Abuse History    History  Current and historical diagnoses or mental health concerns: Schizoaffective Disorder, Depression, Anxiety, paranoia.    Prior MH services (inpatient, programmatic care, outpatient, etc) : Yes Numerous visits to various emergency departments.    History of substance abuse: No    Prior RADHA services (inpatient, programmatic care, detox, outpatient, etc) : No    History of commitment: No    Family history of MH/RADHA: No    Trauma history: Yes Homelessness    Medication  Psychotropic medications: No    Current Care Team  Primary Care Provider: No    Psychiatrist: No    Therapist: No    : No    CTSS or ARMHS: No    ACT Team: No    Other: No    Release of Information  Was a release of information signed: No. Reason: patient discharged.      Biopsychosocial Information    Socioeconomic Information  Current living situation: temporary housing    Employment/income source: None reported    Relevant legal issues: none reported    Cultural, Islam, or spiritual influences on mental health care: none endorsed    Is the patient active in the  or a : No      Relevant Medical Concerns   Patient identifies concerns with completing ADLs? Yes     Patient can ambulate independently? Yes     Other medical concerns? Yes     History of concussion or TBI? No        Diagnosis    295.70  (F25.1) Schizoaffective Disorder Depressive Type - by history     311 (F32.9) Unspecified Depressive Disorder  - by history      300.00 (F41.9) Unspecified Anxiety Disorder - by history       Therapeutic Intervention  The following therapeutic methodologies were employed when working with the patient: establishing rapport, active listening, assessing dimensions of crisis, identifying additional supports and alternative coping skills, establishing a discharge plan, motivational interviewing and brief supportive therapy. Patient response to intervention: positive.      Disposition  Recommended disposition: Individual Therapy      Reviewed case and recommendations with attending provider. Attending Name: Dr. KUSHAL De Souza MD      Attending concurs with disposition: Yes      Patient concurs with disposition: Yes      Guardian concurs with disposition: NA     Final disposition: Individual therapy .       Clinical Substantiation of Recommendations   Rationale with supporting factors for disposition and diagnosis.     Patient is discharged. Patient endorses chronic suicidal ideation but is not currently suicidal with a plan or intent, is not SIB nor has HI. Patient has antibiotic for identified dental issue and resources provided for follow up on crisis beds. Patient identifies as having current housing. Patient provided information on local crisis beds and shelters he can follow up with if necessary. Additionally, patient has information for the [facility] where he has previously acquired dental assistance for issues such as this. Patient has a history of seeking assistance but not following though, or leaving unannounced before disposition or informing staff. Patient is experiencing homelessness and ongoing chronic depression as a result, as well as previously identified mental health issues.  Patient referred for mental health provider but declined offer.      Assessment Details  Patient interview started at: 10:00pm, and completed at: 10:45pm.    Total duration spent on the patient case in minutes: .75 hrs     CPT code(s) utilized: 41648  - Psychotherapy for Crisis - 60 (30-74*) min       Aftercare and Safety Planning  Follow up plans with MH/RADHA services: No      Aftercare plan placed in the AVS and provided to patient: Yes. Given to patient by RN    Jonny Mosher MA      Aftercare Plan  If I am feeling unsafe or I am in a crisis, I will:  Contact my established care providers   Call the National Suicide Prevention Lifeline: 158.884.8617   Go to the nearest emergency room   Call 919        Warning signs that I or other people might notice when a crisis is developing for me: I am not willing to wait for services in the emergency room and leave before being seen. I don't tell anyone i'm leaving when I do leave.    Things I am able to do on my own to cope or help me feel better: Follow through on my emergency room visits and get seen for services I went there for.    Things that I am able to do with others to cope or help me better: Find stable shelter and housing and maintain it if at all possible.    Changes I can make to support my mental health and wellness: Follow through on appointments. Be patient and wait for services to be provided. Use [facility] to acquire a primary doctor.    People in my life that I can ask for help: My [facility] providers    Your [facility] has a mental health crisis team you can call 24/7: [facility] Adult Crisis.:  [phone]    Other things that are important when I m in crisis: I have resources that will help if I will be patient.    Additional resources and information:     Crisis Lines  Crisis Text Line  Text 186810  You will be connected with a trained live crisis counselor to provide support.     National Hope Line  1.800.SUICIDE [3456615]        Community Resources  Fast Tracker  Linking people to mental health and substance use disorder resources  fasttrackermn.org      [place] Mental Health Warm Line  Peer to peer support  Monday thru Saturday, 12 pm to 10 pm  299.298.4193 or  "1.722.727.8386  Text \"Support\" to 89617     National [place] on Mental Illness (CARLA)  289.324.2581 or 1.178.CARLA.HELPS           Mental Health Apps  My3  https://my3app.org/     VirtualHopeBox  https://[employer].org/apps/virtual-hope-box/             "

## 2022-01-12 ENCOUNTER — HOSPITAL ENCOUNTER (EMERGENCY)
Facility: CLINIC | Age: 27
Discharge: HOME | End: 2022-01-13
Attending: EMERGENCY MEDICINE | Admitting: EMERGENCY MEDICINE
Payer: COMMERCIAL

## 2022-01-12 DIAGNOSIS — F25.1 SCHIZOAFFECTIVE DISORDER, DEPRESSIVE TYPE (H): ICD-10-CM

## 2022-01-12 PROCEDURE — 99284 EMERGENCY DEPT VISIT MOD MDM: CPT | Performed by: EMERGENCY MEDICINE

## 2022-01-12 PROCEDURE — 99285 EMERGENCY DEPT VISIT HI MDM: CPT | Mod: 25 | Performed by: EMERGENCY MEDICINE

## 2022-01-12 PROCEDURE — 90791 PSYCH DIAGNOSTIC EVALUATION: CPT

## 2022-01-13 ASSESSMENT — ENCOUNTER SYMPTOMS
BACK PAIN: 0
FEVER: 0
SLEEP DISTURBANCE: 0
FATIGUE: 0
SHORTNESS OF BREATH: 0

## 2022-01-13 NOTE — DISCHARGE INSTRUCTIONS
Please make an appointment to follow up with Your Primary Care Provider in 3-5 days even if entirely better.    Please continue to isolate due to your covid-19 infection.     Aftercare Plan  If I am feeling unsafe or I am in a crisis, I will:   Contact my established care providers   Call the National Suicide Prevention Lifeline: 949.342.9816   Go to the nearest emergency room   Call 911     Warning signs that I or other people might notice when a crisis is developing for me: Active suicidal or violent planning or intent.    Things I am able to do on my own to cope or help me feel better: Take medications     Your [facility] has a mental health crisis team you can call 24/7: [facility] [place] Mobile Crisis  [phone]

## 2022-01-13 NOTE — ED TRIAGE NOTES
"[employer] [facility] after pt. stated was suicidal.  Pt. refused to ride with police to hospital because he didn't \"like the black magic they performed in there cars\"  Pt. also told EMS that \"Coronavirus is not real and the government is in his mind controlling everything.  "

## 2022-01-13 NOTE — ED NOTES
Pt walking around the department, is COVID +.  Pt asking to be discharged, papers were printed and given to Pt.  Pt refused discharge VS.

## 2022-01-13 NOTE — ED NOTES
Bed: HW07UR  Expected date:   Expected time:   Means of arrival:   Comments:  Osiris 427 48 y/o M [facility]

## 2022-01-13 NOTE — ED NOTES
Emergency Department Patient Sign-out       Brief HPI:  This is a 27 year old male signed out to me by Dr. Corbin .  See initial ED Provider note for details of the presentation.            Significant Events prior to my assuming care: Patient with chronic SI. Plan for discharge in AM. COVID+.      Exam:   No data found.        ED RESULTS:   No results found for this visit on 01/12/22 (from the past 24 hour(s)).    ED MEDICATIONS:   Medications - No data to display      Impression:    ICD-10-CM    1. Schizoaffective disorder, depressive type (H)  F25.1        Plan:    Discharge in AM.      Addendum: Patient requesting discharge. He has no further needs at this point. He is discharged in good condition.    MD Chuck Cerna Matthew Joseph, MD  01/13/22 0122       Justino Woods MD  01/13/22 1796

## 2022-01-13 NOTE — ED PROVIDER NOTES
ED Provider Note  [facility]      History     Chief Complaint   Patient presents with     Suicidal     Paranoid     HPI  Ravindra Oro is a 27 year old male who has a history of schizoaffective disorder who presented to the ER due to losing his ID. Pt says that he was wanting to check into a hotel but he cannot find his ID card.  Patient was seen by the police and the police wanted to transport him to the ER.  He told the police that he did not want to go with them.  Patient says he wants to return to [facility] because he does not like it in the night states.  Patient with no active plan for self-harm or suicide.  Patient says that he is trying to work a job so he can return to his home land.  He notes a history going to multiple ERs in the past.  Patient denies any physical complaints currently.  Patient was diagnosed with COVID 2 days prior at an outside ER.  Patient's been seen at multiple ERs recently.  Patient has no chest pain or shortness of breath    Past Medical History  Past Medical History:   Diagnosis Date     Depressive disorder      Psychosis (H)      Schizoaffective disorder (H)      History reviewed. No pertinent surgical history.  acetaminophen (TYLENOL) 325 MG tablet  acetaminophen (TYLENOL) 500 MG tablet  cholecalciferol (VITAMIN D) 1000 UNIT tablet  cyanocobalamin (VITAMIN B-12) 1000 MCG tablet  Fluocinolone Acetonide (DERMA-SMOOTHE/FS SCALP) 0.01 % OIL  FLUoxetine (PROZAC) 10 MG capsule  FLUoxetine (PROZAC) 20 MG capsule  fluticasone (FLONASE) 50 MCG/ACT nasal spray  ibuprofen (ADVIL/MOTRIN) 800 MG tablet  ketoconazole (NIZORAL) 2 % shampoo  mirtazapine (REMERON) 30 MG tablet  OLANZapine (ZYPREXA) 5 MG tablet  OLANZapine zydis (ZYPREXA) 5 MG ODT  triamcinolone (KENALOG) 0.025 % external ointment      No Known Allergies  Family History  Family History   Problem Relation Age of Onset     Cancer No family hx of         No family history of skin cancer     Melanoma No  family hx of      Skin Cancer No family hx of      Social History   Social History     Tobacco Use     Smoking status: Never Smoker     Smokeless tobacco: Never Used   Substance Use Topics     Alcohol use: Never     Drug use: Not Currently     Types: Marijuana      Past medical history, past surgical history, medications, allergies, family history, and social history were reviewed with the patient. No additional pertinent items.       Review of Systems   Constitutional: Negative for fatigue and fever.   Respiratory: Negative for shortness of breath.    Musculoskeletal: Negative for back pain.   Psychiatric/Behavioral: Negative for sleep disturbance and suicidal ideas.     A complete review of systems was performed with pertinent positives and negatives noted in the HPI, and all other systems negative.    Physical Exam   BP:  (Pt. declined)  Physical Exam  Constitutional:       General: He is not in acute distress.     Appearance: He is well-developed. He is not ill-appearing, toxic-appearing or diaphoretic.   HENT:      Head: Normocephalic and atraumatic.   Cardiovascular:      Rate and Rhythm: Normal rate and regular rhythm.      Heart sounds: Normal heart sounds.   Pulmonary:      Effort: Pulmonary effort is normal. No respiratory distress.      Breath sounds: No wheezing.   Abdominal:      General: There is no distension.      Palpations: Abdomen is soft.      Tenderness: There is no abdominal tenderness. There is no rebound.   Musculoskeletal:      Cervical back: Normal range of motion and neck supple.   Skin:     General: Skin is warm.   Neurological:      General: No focal deficit present.      Mental Status: He is alert and oriented to person, place, and time.   Psychiatric:         Mood and Affect: Mood normal.         Behavior: Behavior normal.         Thought Content: Thought content normal.         ED Course      Procedures       The medical record was reviewed and interpreted.  Current labs reviewed and  interpreted.  Previous labs reviewed and interpreted.     No results found for any visits on 01/12/22.  Medications - No data to display           No results found for any visits on 01/12/22.  Medications - No data to display     Assessments & Plan (with Medical Decision Making)   Per DEC  no active HI or SI.   No concern for self-harm.   Will plan in allowing to sleep in the room overnight. Will discharge in the morning.  No acute medical or psychiatric concerns.     I have reviewed the nursing notes. I have reviewed the findings, diagnosis, plan and need for follow up with the patient.    New Prescriptions    No medications on file       Final diagnoses:   None       --  Estephania Corbin  [facility] EMERGENCY DEPARTMENT  1/12/2022     Estephania Corbin MD  01/13/22 0047

## 2022-01-13 NOTE — ED NOTES
1/12/2022  Ravindra Oro 1995     [facility] Crisis Assessment    Patient was assessed: in person  Patient location: [facility]    Referral Data and Chief Complaint  Pt is a 27 year old who male uses he/him pronouns presenta to the ED via EMS and was referred to the ED by community provider(s). Patient is presenting to the ED for the following concerns: psychotic symptoms.      Informed Consent and Assessment Methods    Patient is his own guardian. Writer met with patient and explained the crisis assessment process, including applicable information disclosures and limits to confidentiality, assessed understanding of the process, and obtained consent to proceed with the assessment. Patient was observed to be able to participate in the assessment as evidenced by engaged. Assessment methods included conducting a formal interview with patient, review of medical records, collaboration with medical staff, and obtaining relevant collateral information from family and community providers when available.    Narrative Summary of Presenting Problem and Current Functioning  What led to the patient presenting for crisis services, factors that make the crisis life threatening or complex, stressors, how is this disrupting the patient's life, and how current functioning is in comparison to baseline. How is patient presenting during the assessment.     Pt presents by ambulance after a call from transit police that pt was reporting he was suicidal.  Pt refused to ride in a police car reporting that they perform black magic in their cars.  He indicates that the corona virus is not real, a government fabrication.  In the interview pt says he did not threaten suicide, instead claims he told them that he no longer wanted to live here, wanted to return to his home [place].  He says that everyone harass[place] in the [place], that people are following him and taking his things.  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  He is convinced someone took it, has something to do with witchcraft, that he is being gang stalked, shows his hair line as an example of the stalking.  He denies being suicidal or violent, denies drug or alcohol use, denies he has mental health issues, not interested in mental health care.  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  He feels safe for discharge.    Collateral Information    Epic and Care Everywhere records.    Risk Assessment    Risk of Harm to Self     ESS-6  1.a. Over the past 2 weeks, have you had thoughts of killing yourself? No  1.b. Have you ever attempted to kill yourself and, if yes, when did this last happen? No   2. Recent or current suicide plan? No   3. Recent or current intent to act on ideation? No  4. Lifetime psychiatric hospitalization? No  5. Pattern of excessive substance use? No  6. Current irritability, agitation, or aggression? No  Scoring note: BOTH 1a and 1b must be yes for it to score 1 point, if both are not yes it is zero. All others are 1 point per number. If all questions 1a/1b - 6 are no, risk is negligible. If one of 1a/1b is yes, then risk is mild. If either question 2 or 3, but not both, is yes, then risk is automatically moderate regardless of total score. If both 2 and 3 are yes, risk is automatically high regardless of total score.     Score: 0, mild risk    The patient has the following risk factors for suicide: poor impulse control and psychosis    Is the patient experiencing current suicidal ideation: No    Is the patient engaging in preparatory suicide behaviors (formulating how to act on plan, giving away possessions, saying goodbye, displaying dramatic behavior changes, etc)? No    Does the patient have access  to firearms or other lethal means? no    The patient has the following protective factors: future focused thinking    Support system information: Aware of community services    Patient strengths: Resilient    Does the patient engage in non-suicidal self-injurious behavior (NSSI/SIB)? no    Is the patient vulnerable to sexual exploitation?  No    Is the patient experiencing abuse or neglect? no    Is the patient a vulnerable adult? No      Risk of Harm to Others  The patient has the following risk factors of harm to others: impaired self-control    Does the patient have thoughts of harming others? No    Is the patient engaging in sexually inappropriate behavior?  no       Current Substance Abuse    Is there recent substance abuse? no    Was a urine drug screen or blood alcohol level obtained: No    Current Symptoms/Concerns    Symptoms  Attention, hyperactivity, and impulsivity symptoms present: Yes: Impulsive    Anxiety symptoms present: No      Appetite symptoms present: No     Behavioral difficulties present: Yes: Other: psychosis     Cognitive impairment symptoms present: Yes: Judgment/Insight    Depressive symptoms present: No    Eating disorder symptoms present: No    Learning disabilities, cognitive challenges, and/or developmental disorder symptoms present: No     Manic/hypomanic symptoms present: No    Personality and interpersonal functioning difficulties present : Yes: Impaired Interpersonal Functioning    Psychosis symptoms present: Yes: Paranoia      Sleep difficulties present: No    Substance abuse disorder symptoms present: No     Trauma and stressor related symptoms present: No           Mental Status Exam   Affect: Constricted   Appearance: Appropriate    Attention Span/Concentration: Attentive?    Eye Contact: Engaged   Fund of Knowledge: Appropriate    Language /Speech Content: Fluent   Language /Speech Volume: Normal    Language /Speech Rate/Productions: Normal    Recent Memory: Intact   Remote  Memory: Intact   Mood: Anxious    Orientation to Person: Yes    Orientation to Place: Yes   Orientation to Time of Day: Yes    Orientation to Date: Yes    Situation (Do they understand why they are here?): Yes    Psychomotor Behavior: Normal    Thought Content: Paranoia   Thought Form: Intact       Mental Health and Substance Abuse History    History  Current and historical diagnoses or mental health concerns: Schizoaffective D/O, depression, anxiety.    Prior MH services (inpatient, programmatic care, outpatient, etc) : Yes Inpatient, outpatient and ED care.    History of substance abuse: No    Prior RADHA services (inpatient, programmatic care, detox, outpatient, etc) : No    History of commitment: No    Family history of MH/RADHA: No    Trauma history: No    Medication  Psychotropic medications: No    Current Care Team  Primary Care Provider: No    Psychiatrist: No    Therapist: No    : No    Release of Information    Was a release of information signed: No. 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    Relevant Medical Concerns   Patient identifies concerns with completing ADLs? No     Patient can ambulate independently? Yes     Other medical concerns? No     History of concussion or TBI? No        Diagnosis      Schizoaffective D/O, depressive type F25.1    Therapeutic Intervention  The following therapeutic methodologies were employed when working with the patient: establishing rapport, active listening, assessing dimensions of crisis, solution focused brief therapy, identifying additional supports and alternative coping skills, establishing a discharge plan, safety planning, psychoeducation, motivational interviewing, brief supportive therapy, trauma informed care, treatment planning and harm reduction. Patient response to intervention:  engagement.      Disposition  Recommended disposition: Medication Management      Reviewed case and recommendations with attending provider. Attending Name: Dr. Woods      Attending concurs with disposition: Yes      Patient concurs with disposition: No: Pt not interested in mental health care      Guardian concurs with disposition: NA     Final disposition: Medication management.     Clinical Substantiation of Recommendations   Rationale with supporting factors for disposition and diagnosis.     Pt presents for assessment of psychotic symptoms, no acute risk at this time, recommend discharge, seek mental health care, utilize community crisis and emergency room services if things worsen.    Assessment Details  Patient interview started at: 12:00 and completed at: 01:00.    Total duration spent on the patient case in minutes: 1.0 hrs     CPT code(s) utilized: 55649 - Psychotherapy for Crisis - 60 (30-74*) min       Aftercare and Safety Planning  Follow up plans with MH/RADHA services: Yes See AVS      Aftercare plan placed in the AVS and provided to patient: Yes. Given to patient by LMNISHI orally and by RN in writing.        David Brooke, MSW, LICSW

## 2022-01-19 ENCOUNTER — HOSPITAL ENCOUNTER (EMERGENCY)
Facility: CLINIC | Age: 27
Discharge: LEFT BEFORE DISCHARGE | End: 2022-01-19
Attending: EMERGENCY MEDICINE | Admitting: EMERGENCY MEDICINE
Payer: COMMERCIAL

## 2022-01-19 VITALS
WEIGHT: 152 LBS | RESPIRATION RATE: 16 BRPM | TEMPERATURE: 97.9 F | HEART RATE: 61 BPM | BODY MASS INDEX: 27.97 KG/M2 | OXYGEN SATURATION: 98 % | HEIGHT: 62 IN | SYSTOLIC BLOOD PRESSURE: 128 MMHG | DIASTOLIC BLOOD PRESSURE: 97 MMHG

## 2022-01-19 DIAGNOSIS — R07.89 OTHER CHEST PAIN: ICD-10-CM

## 2022-01-19 DIAGNOSIS — R07.9 CHEST PAIN, UNSPECIFIED TYPE: ICD-10-CM

## 2022-01-19 DIAGNOSIS — R06.02 SHORTNESS OF BREATH: ICD-10-CM

## 2022-01-19 LAB
ALBUMIN SERPL-MCNC: 3.9 G/DL (ref 3.4–5)
ALP SERPL-CCNC: 40 U/L (ref 40–150)
ALT SERPL W P-5'-P-CCNC: 23 U/L (ref 0–70)
ANION GAP SERPL CALCULATED.3IONS-SCNC: 4 MMOL/L (ref 3–14)
AST SERPL W P-5'-P-CCNC: 15 U/L (ref 0–45)
ATRIAL RATE - MUSE: 66 BPM
BASOPHILS # BLD AUTO: 0 10E3/UL (ref 0–0.2)
BASOPHILS NFR BLD AUTO: 1 %
BILIRUB SERPL-MCNC: 0.5 MG/DL (ref 0.2–1.3)
BUN SERPL-MCNC: 17 MG/DL (ref 7–30)
CALCIUM SERPL-MCNC: 8.9 MG/DL (ref 8.5–10.1)
CHLORIDE BLD-SCNC: 104 MMOL/L (ref 94–109)
CO2 SERPL-SCNC: 30 MMOL/L (ref 20–32)
CREAT SERPL-MCNC: 0.78 MG/DL (ref 0.66–1.25)
DIASTOLIC BLOOD PRESSURE - MUSE: NORMAL MMHG
EOSINOPHIL # BLD AUTO: 0.2 10E3/UL (ref 0–0.7)
EOSINOPHIL NFR BLD AUTO: 4 %
ERYTHROCYTE [DISTWIDTH] IN BLOOD BY AUTOMATED COUNT: 13.1 % (ref 10–15)
GFR SERPL CREATININE-BSD FRML MDRD: >90 ML/MIN/1.73M2
GLUCOSE BLD-MCNC: 103 MG/DL (ref 70–99)
HCT VFR BLD AUTO: 45.6 % (ref 40–53)
HGB BLD-MCNC: 15 G/DL (ref 13.3–17.7)
HOLD SPECIMEN: NORMAL
IMM GRANULOCYTES # BLD: 0 10E3/UL
IMM GRANULOCYTES NFR BLD: 0 %
INTERPRETATION ECG - MUSE: NORMAL
LIPASE SERPL-CCNC: 103 U/L (ref 73–393)
LYMPHOCYTES # BLD AUTO: 1.6 10E3/UL (ref 0.8–5.3)
LYMPHOCYTES NFR BLD AUTO: 38 %
MCH RBC QN AUTO: 28.7 PG (ref 26.5–33)
MCHC RBC AUTO-ENTMCNC: 32.9 G/DL (ref 31.5–36.5)
MCV RBC AUTO: 87 FL (ref 78–100)
MONOCYTES # BLD AUTO: 0.4 10E3/UL (ref 0–1.3)
MONOCYTES NFR BLD AUTO: 10 %
NEUTROPHILS # BLD AUTO: 2 10E3/UL (ref 1.6–8.3)
NEUTROPHILS NFR BLD AUTO: 47 %
NRBC # BLD AUTO: 0 10E3/UL
NRBC BLD AUTO-RTO: 0 /100
P AXIS - MUSE: 44 DEGREES
PLATELET # BLD AUTO: 235 10E3/UL (ref 150–450)
POTASSIUM BLD-SCNC: 3.6 MMOL/L (ref 3.4–5.3)
PR INTERVAL - MUSE: 136 MS
PROT SERPL-MCNC: 7.1 G/DL (ref 6.8–8.8)
QRS DURATION - MUSE: 82 MS
QT - MUSE: 384 MS
QTC - MUSE: 402 MS
R AXIS - MUSE: 5 DEGREES
RBC # BLD AUTO: 5.22 10E6/UL (ref 4.4–5.9)
SODIUM SERPL-SCNC: 138 MMOL/L (ref 133–144)
SYSTOLIC BLOOD PRESSURE - MUSE: NORMAL MMHG
T AXIS - MUSE: 28 DEGREES
TROPONIN I SERPL HS-MCNC: <3 NG/L
VENTRICULAR RATE- MUSE: 66 BPM
WBC # BLD AUTO: 4.2 10E3/UL (ref 4–11)

## 2022-01-19 PROCEDURE — 84484 ASSAY OF TROPONIN QUANT: CPT | Performed by: EMERGENCY MEDICINE

## 2022-01-19 PROCEDURE — 80053 COMPREHEN METABOLIC PANEL: CPT | Performed by: EMERGENCY MEDICINE

## 2022-01-19 PROCEDURE — 83690 ASSAY OF LIPASE: CPT | Performed by: EMERGENCY MEDICINE

## 2022-01-19 PROCEDURE — 85025 COMPLETE CBC W/AUTO DIFF WBC: CPT | Performed by: EMERGENCY MEDICINE

## 2022-01-19 PROCEDURE — 99285 EMERGENCY DEPT VISIT HI MDM: CPT | Mod: 25 | Performed by: EMERGENCY MEDICINE

## 2022-01-19 PROCEDURE — 36415 COLL VENOUS BLD VENIPUNCTURE: CPT | Performed by: EMERGENCY MEDICINE

## 2022-01-19 PROCEDURE — 93010 ELECTROCARDIOGRAM REPORT: CPT | Performed by: EMERGENCY MEDICINE

## 2022-01-19 PROCEDURE — 99285 EMERGENCY DEPT VISIT HI MDM: CPT | Mod: 25

## 2022-01-19 PROCEDURE — 93005 ELECTROCARDIOGRAM TRACING: CPT

## 2022-01-19 ASSESSMENT — ENCOUNTER SYMPTOMS
SHORTNESS OF BREATH: 1
MYALGIAS: 0
EYE PAIN: 0
PALPITATIONS: 0
ABDOMINAL PAIN: 0
DIZZINESS: 0
NAUSEA: 0
FREQUENCY: 0
DIARRHEA: 0
BACK PAIN: 0
CONFUSION: 0
HEADACHES: 0
CHILLS: 0
DYSURIA: 0
FEVER: 0
WEAKNESS: 0
ARTHRALGIAS: 0
CHEST TIGHTNESS: 0
VOMITING: 0
SORE THROAT: 0
CONSTIPATION: 0
COUGH: 0
COLOR CHANGE: 0
NECK PAIN: 0
DIFFICULTY URINATING: 0
ABDOMINAL DISTENTION: 0
FATIGUE: 0

## 2022-01-19 ASSESSMENT — MIFFLIN-ST. JEOR: SCORE: 1538.72

## 2022-01-19 NOTE — ED TRIAGE NOTES
Patient reports chest pain that started this morning. Poor historian. Denies shortness of breath, light headedness, or nausea. Denies cardiac history. Alyssa Osuna RN on 1/19/2022 at 5:33 AM

## 2022-01-19 NOTE — ED NOTES
Bed: ED03  Expected date: 1/19/22  Expected time: 4:58 AM  Means of arrival: Ambulance  Comments:  [address]  27 M  CP

## 2022-01-19 NOTE — ED NOTES
"Patient requesting IV to be removed. Patient states, \"I am going to leave at 7\". MD aware. IV removed. Alyssa Osuna RN on 1/19/2022 at 6:19 AM    "

## 2022-01-19 NOTE — ED NOTES
Patient demands to leave. Refuses to sign AMA form. MD aware.  Alyssa Osuna RN on 1/19/2022 at 6:25 AM

## 2022-01-23 ENCOUNTER — HOSPITAL ENCOUNTER (EMERGENCY)
Facility: CLINIC | Age: 27
Discharge: HOME | End: 2022-01-24
Attending: STUDENT IN AN ORGANIZED HEALTH CARE EDUCATION/TRAINING PROGRAM | Admitting: STUDENT IN AN ORGANIZED HEALTH CARE EDUCATION/TRAINING PROGRAM
Payer: COMMERCIAL

## 2022-01-23 DIAGNOSIS — R07.9 CHEST PAIN, UNSPECIFIED TYPE: Primary | ICD-10-CM

## 2022-01-23 DIAGNOSIS — R10.9 ACUTE ABDOMINAL PAIN: ICD-10-CM

## 2022-01-23 PROCEDURE — 99285 EMERGENCY DEPT VISIT HI MDM: CPT | Mod: 25 | Performed by: STUDENT IN AN ORGANIZED HEALTH CARE EDUCATION/TRAINING PROGRAM

## 2022-01-23 PROCEDURE — 93010 ELECTROCARDIOGRAM REPORT: CPT | Performed by: STUDENT IN AN ORGANIZED HEALTH CARE EDUCATION/TRAINING PROGRAM

## 2022-01-23 PROCEDURE — 93005 ELECTROCARDIOGRAM TRACING: CPT | Performed by: STUDENT IN AN ORGANIZED HEALTH CARE EDUCATION/TRAINING PROGRAM

## 2022-01-23 RX ORDER — ACETAMINOPHEN 500 MG
1000 TABLET ORAL ONCE
Status: COMPLETED | OUTPATIENT
Start: 2022-01-24 | End: 2022-01-24

## 2022-01-24 ENCOUNTER — APPOINTMENT (OUTPATIENT)
Dept: ULTRASOUND IMAGING | Facility: CLINIC | Age: 27
End: 2022-01-24
Attending: STUDENT IN AN ORGANIZED HEALTH CARE EDUCATION/TRAINING PROGRAM
Payer: COMMERCIAL

## 2022-01-24 ENCOUNTER — APPOINTMENT (OUTPATIENT)
Dept: GENERAL RADIOLOGY | Facility: CLINIC | Age: 27
End: 2022-01-24
Attending: STUDENT IN AN ORGANIZED HEALTH CARE EDUCATION/TRAINING PROGRAM
Payer: COMMERCIAL

## 2022-01-24 VITALS
BODY MASS INDEX: 25.37 KG/M2 | DIASTOLIC BLOOD PRESSURE: 77 MMHG | HEART RATE: 67 BPM | SYSTOLIC BLOOD PRESSURE: 117 MMHG | WEIGHT: 138.7 LBS | TEMPERATURE: 97.5 F | RESPIRATION RATE: 16 BRPM | OXYGEN SATURATION: 100 %

## 2022-01-24 LAB
ALBUMIN SERPL-MCNC: 3.8 G/DL (ref 3.4–5)
ALP SERPL-CCNC: 41 U/L (ref 40–150)
ALT SERPL W P-5'-P-CCNC: 21 U/L (ref 0–70)
ANION GAP SERPL CALCULATED.3IONS-SCNC: 5 MMOL/L (ref 3–14)
AST SERPL W P-5'-P-CCNC: 17 U/L (ref 0–45)
ATRIAL RATE - MUSE: 63 BPM
BASOPHILS # BLD AUTO: 0 10E3/UL (ref 0–0.2)
BASOPHILS NFR BLD AUTO: 0 %
BILIRUB SERPL-MCNC: 0.5 MG/DL (ref 0.2–1.3)
BUN SERPL-MCNC: 15 MG/DL (ref 7–30)
CALCIUM SERPL-MCNC: 8.8 MG/DL (ref 8.5–10.1)
CHLORIDE BLD-SCNC: 109 MMOL/L (ref 94–109)
CO2 SERPL-SCNC: 26 MMOL/L (ref 20–32)
CREAT SERPL-MCNC: 0.77 MG/DL (ref 0.66–1.25)
DIASTOLIC BLOOD PRESSURE - MUSE: NORMAL MMHG
EOSINOPHIL # BLD AUTO: 0.2 10E3/UL (ref 0–0.7)
EOSINOPHIL NFR BLD AUTO: 3 %
ERYTHROCYTE [DISTWIDTH] IN BLOOD BY AUTOMATED COUNT: 12.8 % (ref 10–15)
GFR SERPL CREATININE-BSD FRML MDRD: >90 ML/MIN/1.73M2
GLUCOSE BLD-MCNC: 84 MG/DL (ref 70–99)
HCT VFR BLD AUTO: 43.5 % (ref 40–53)
HGB BLD-MCNC: 14.8 G/DL (ref 13.3–17.7)
IMM GRANULOCYTES # BLD: 0 10E3/UL
IMM GRANULOCYTES NFR BLD: 0 %
INTERPRETATION ECG - MUSE: NORMAL
LIPASE SERPL-CCNC: 95 U/L (ref 73–393)
LYMPHOCYTES # BLD AUTO: 1.6 10E3/UL (ref 0.8–5.3)
LYMPHOCYTES NFR BLD AUTO: 24 %
MCH RBC QN AUTO: 29.5 PG (ref 26.5–33)
MCHC RBC AUTO-ENTMCNC: 34 G/DL (ref 31.5–36.5)
MCV RBC AUTO: 87 FL (ref 78–100)
MONOCYTES # BLD AUTO: 0.7 10E3/UL (ref 0–1.3)
MONOCYTES NFR BLD AUTO: 10 %
NEUTROPHILS # BLD AUTO: 4.3 10E3/UL (ref 1.6–8.3)
NEUTROPHILS NFR BLD AUTO: 63 %
NRBC # BLD AUTO: 0 10E3/UL
NRBC BLD AUTO-RTO: 0 /100
P AXIS - MUSE: 47 DEGREES
PLATELET # BLD AUTO: 302 10E3/UL (ref 150–450)
POTASSIUM BLD-SCNC: 3.8 MMOL/L (ref 3.4–5.3)
PR INTERVAL - MUSE: 148 MS
PROT SERPL-MCNC: 6.8 G/DL (ref 6.8–8.8)
QRS DURATION - MUSE: 82 MS
QT - MUSE: 384 MS
QTC - MUSE: 392 MS
R AXIS - MUSE: 12 DEGREES
RBC # BLD AUTO: 5.02 10E6/UL (ref 4.4–5.9)
SODIUM SERPL-SCNC: 140 MMOL/L (ref 133–144)
SYSTOLIC BLOOD PRESSURE - MUSE: NORMAL MMHG
T AXIS - MUSE: 42 DEGREES
TROPONIN I SERPL HS-MCNC: 4 NG/L
VENTRICULAR RATE- MUSE: 63 BPM
WBC # BLD AUTO: 6.9 10E3/UL (ref 4–11)

## 2022-01-24 PROCEDURE — 76705 ECHO EXAM OF ABDOMEN: CPT

## 2022-01-24 PROCEDURE — 250N000009 HC RX 250: Performed by: STUDENT IN AN ORGANIZED HEALTH CARE EDUCATION/TRAINING PROGRAM

## 2022-01-24 PROCEDURE — 36415 COLL VENOUS BLD VENIPUNCTURE: CPT | Performed by: STUDENT IN AN ORGANIZED HEALTH CARE EDUCATION/TRAINING PROGRAM

## 2022-01-24 PROCEDURE — 93005 ELECTROCARDIOGRAM TRACING: CPT | Performed by: EMERGENCY MEDICINE

## 2022-01-24 PROCEDURE — 80053 COMPREHEN METABOLIC PANEL: CPT | Performed by: STUDENT IN AN ORGANIZED HEALTH CARE EDUCATION/TRAINING PROGRAM

## 2022-01-24 PROCEDURE — 93010 ELECTROCARDIOGRAM REPORT: CPT | Performed by: EMERGENCY MEDICINE

## 2022-01-24 PROCEDURE — 99284 EMERGENCY DEPT VISIT MOD MDM: CPT | Mod: 25 | Performed by: EMERGENCY MEDICINE

## 2022-01-24 PROCEDURE — 84484 ASSAY OF TROPONIN QUANT: CPT | Performed by: STUDENT IN AN ORGANIZED HEALTH CARE EDUCATION/TRAINING PROGRAM

## 2022-01-24 PROCEDURE — 85025 COMPLETE CBC W/AUTO DIFF WBC: CPT | Performed by: STUDENT IN AN ORGANIZED HEALTH CARE EDUCATION/TRAINING PROGRAM

## 2022-01-24 PROCEDURE — 83690 ASSAY OF LIPASE: CPT | Performed by: STUDENT IN AN ORGANIZED HEALTH CARE EDUCATION/TRAINING PROGRAM

## 2022-01-24 PROCEDURE — 71045 X-RAY EXAM CHEST 1 VIEW: CPT

## 2022-01-24 PROCEDURE — 99284 EMERGENCY DEPT VISIT MOD MDM: CPT | Performed by: EMERGENCY MEDICINE

## 2022-01-24 PROCEDURE — 250N000013 HC RX MED GY IP 250 OP 250 PS 637: Performed by: STUDENT IN AN ORGANIZED HEALTH CARE EDUCATION/TRAINING PROGRAM

## 2022-01-24 RX ORDER — FAMOTIDINE 20 MG/1
20 TABLET, FILM COATED ORAL
COMMUNITY
Start: 2022-01-15 | End: 2022-02-10

## 2022-01-24 RX ORDER — ASPIRIN 325 MG
325 TABLET ORAL ONCE
Status: COMPLETED | OUTPATIENT
Start: 2022-01-24 | End: 2022-01-24

## 2022-01-24 RX ADMIN — ASPIRIN 325 MG ORAL TABLET 325 MG: 325 PILL ORAL at 01:31

## 2022-01-24 RX ADMIN — ACETAMINOPHEN 1000 MG: 500 TABLET ORAL at 00:00

## 2022-01-24 RX ADMIN — LIDOCAINE HYDROCHLORIDE 30 ML: 20 SOLUTION ORAL; TOPICAL at 00:00

## 2022-01-24 ASSESSMENT — ENCOUNTER SYMPTOMS
MYALGIAS: 1
NAUSEA: 1

## 2022-01-24 NOTE — ED PROVIDER NOTES
[facility] EMERGENCY DEPARTMENT ([facility])    1/23/22    ED05  History     Chief Complaint   Patient presents with     Chest Pain     comes and goes for a while. Muscle pain all over his body.     The history is provided by the patient and medical records.     Ravindra Oro is a 27 year old male with a past medical history significant for psychosis, schizoaffective disorder, depressive disorder, homelessness who presents to the ED for an evaluation of chest pain.  Patient is a frequent visitor to the ED and was just seen for chest pain yesterday at [facility] and was found to be in no acute distress.    Patient reports that onset of chest pain earlier today.  He reports generalized muscle tightness.  Denies trouble breathing.  No vomiting or diarrhea.  Denies fever or chills.  No allergies to medications. No drug or alcohol use.  No history of surgeries.  Patient had 2 doses of the Pfizer vaccine for COVID-19.    Per chart review patient was COVID-positive on 1/11/2022.    XR chest 1 view 1/16/2022  FINDINGS:   No acute airspace or interstitial process. Small nodular densities in the  right upper lobe could represent possible granulomas with possible subtle calcified right hilar lymph nodes. This could be compared with more remote chest radiographs or CT imaging be considered. No effusion or pneumothorax.       Past Medical History  Past Medical History:   Diagnosis Date     Depressive disorder      Psychosis (H)      Schizoaffective disorder (H)      No past surgical history on file.  acetaminophen (TYLENOL) 325 MG tablet  acetaminophen (TYLENOL) 500 MG tablet  cholecalciferol (VITAMIN D) 1000 UNIT tablet  cyanocobalamin (VITAMIN B-12) 1000 MCG tablet  Fluocinolone Acetonide (DERMA-SMOOTHE/FS SCALP) 0.01 % OIL  FLUoxetine (PROZAC) 10 MG capsule  FLUoxetine (PROZAC) 20 MG capsule  fluticasone (FLONASE) 50 MCG/ACT nasal spray  ibuprofen (ADVIL/MOTRIN) 800 MG tablet  ketoconazole (NIZORAL) 2 %  shampoo  mirtazapine (REMERON) 30 MG tablet  OLANZapine (ZYPREXA) 5 MG tablet  OLANZapine zydis (ZYPREXA) 5 MG ODT  triamcinolone (KENALOG) 0.025 % external ointment      No Known Allergies  Family History  Family History   Problem Relation Age of Onset     Cancer No family hx of         No family history of skin cancer     Melanoma No family hx of      Skin Cancer No family hx of      Social History   Social History     Tobacco Use     Smoking status: Never Smoker     Smokeless tobacco: Never Used   Substance Use Topics     Alcohol use: Never     Drug use: Not Currently     Types: Marijuana      Past medical history, past surgical history, medications, allergies, family history, and social history were reviewed with the patient. No additional pertinent items.       Review of Systems   Cardiovascular: Positive for chest pain.   Gastrointestinal: Positive for nausea.   Musculoskeletal: Positive for myalgias.   All other systems reviewed and are negative.    A complete review of systems was performed with pertinent positives and negatives noted in the HPI, and all other systems negative.    Physical Exam   BP: 111/74  Pulse: 83  Temp: 97.5  F (36.4  C)  Resp: 16  Weight: 62.9 kg (138 lb 11.2 oz)  SpO2: 98 %  Physical Exam  Vitals and nursing note reviewed.   Constitutional:       Appearance: He is not toxic-appearing.   HENT:      Head: Normocephalic and atraumatic.      Right Ear: External ear normal.      Left Ear: External ear normal.      Nose: Nose normal.   Eyes:      Extraocular Movements: Extraocular movements intact.      Conjunctiva/sclera: Conjunctivae normal.   Cardiovascular:      Rate and Rhythm: Normal rate and regular rhythm.   Pulmonary:      Effort: Pulmonary effort is normal.      Breath sounds: Normal breath sounds.   Chest:      Chest wall: Tenderness present.   Abdominal:      General: There is no distension.      Palpations: Abdomen is soft.      Tenderness: There is abdominal tenderness.       Comments: Epigastric TTP   Musculoskeletal:         General: No deformity or signs of injury.      Cervical back: Neck supple. No rigidity.   Skin:     General: Skin is warm.      Findings: No rash.   Neurological:      General: No focal deficit present.      Mental Status: He is alert. Mental status is at baseline.   Psychiatric:         Mood and Affect: Mood normal.         Behavior: Behavior normal.       ED Course      Procedures            EKG Interpretation:      Interpreted by Natividad Nevarez MD  Time reviewed: 0030  Symptoms at time of EKG: Chest pain   Rhythm: normal sinus   Rate: Normal  ST Segments/ T Waves: No acute ischemic changes  Comparison to prior: Unchanged from 1/19/22    Clinical Impression: no acute changes                          Results for orders placed or performed during the hospital encounter of 01/23/22   Abdomen US, limited (RUQ only)     Status: None    Narrative    EXAM: US ABDOMEN LIMITED  LOCATION: [facility]  DATE/TIME: 1/24/2022 12:23 AM    INDICATION: epigastric abdominal pian  COMPARISON: None.  TECHNIQUE: Limited abdominal ultrasound.    FINDINGS: Technically challenging exam due to uncooperative patient.    GALLBLADDER: Normal. No gallstones, wall thickening, or pericholecystic fluid. Negative sonographic Young's sign.    BILE DUCTS: No biliary dilatation. The common duct measures 4 mm.    LIVER: Normal parenchyma with smooth contour. No focal mass.    RIGHT KIDNEY: No hydronephrosis.    PANCREAS: The visualized portions are normal.    No ascites.      Impression    IMPRESSION:  1.  Normal limited abdominal ultrasound.       Comprehensive metabolic panel     Status: Normal   Result Value Ref Range    Sodium 140 133 - 144 mmol/L    Potassium 3.8 3.4 - 5.3 mmol/L    Chloride 109 94 - 109 mmol/L    Carbon Dioxide (CO2) 26 20 - 32 mmol/L    Anion Gap 5 3 - 14 mmol/L    Urea Nitrogen 15 7 - 30 mg/dL    Creatinine 0.77 0.66 - 1.25  mg/dL    Calcium 8.8 8.5 - 10.1 mg/dL    Glucose 84 70 - 99 mg/dL    Alkaline Phosphatase 41 40 - 150 U/L    AST 17 0 - 45 U/L    ALT 21 0 - 70 U/L    Protein Total 6.8 6.8 - 8.8 g/dL    Albumin 3.8 3.4 - 5.0 g/dL    Bilirubin Total 0.5 0.2 - 1.3 mg/dL    GFR Estimate >90 >60 mL/min/1.73m2   Troponin I     Status: Normal   Result Value Ref Range    Troponin I High Sensitivity 4 <79 ng/L   Lipase     Status: Normal   Result Value Ref Range    Lipase 95 73 - 393 U/L   CBC with platelets and differential     Status: None   Result Value Ref Range    WBC Count 6.9 4.0 - 11.0 10e3/uL    RBC Count 5.02 4.40 - 5.90 10e6/uL    Hemoglobin 14.8 13.3 - 17.7 g/dL    Hematocrit 43.5 40.0 - 53.0 %    MCV 87 78 - 100 fL    MCH 29.5 26.5 - 33.0 pg    MCHC 34.0 31.5 - 36.5 g/dL    RDW 12.8 10.0 - 15.0 %    Platelet Count 302 150 - 450 10e3/uL    % Neutrophils 63 %    % Lymphocytes 24 %    % Monocytes 10 %    % Eosinophils 3 %    % Basophils 0 %    % Immature Granulocytes 0 %    NRBCs per 100 WBC 0 <1 /100    Absolute Neutrophils 4.3 1.6 - 8.3 10e3/uL    Absolute Lymphocytes 1.6 0.8 - 5.3 10e3/uL    Absolute Monocytes 0.7 0.0 - 1.3 10e3/uL    Absolute Eosinophils 0.2 0.0 - 0.7 10e3/uL    Absolute Basophils 0.0 0.0 - 0.2 10e3/uL    Absolute Immature Granulocytes 0.0 <=0.4 10e3/uL    Absolute NRBCs 0.0 10e3/uL   CBC with platelets differential     Status: None    Narrative    The following orders were created for panel order CBC with platelets differential.  Procedure                               Abnormality         Status                     ---------                               -----------         ------                     CBC with platelets and d...[[id number]]                      Final result                 Please view results for these tests on the individual orders.     Medications   acetaminophen (TYLENOL) tablet 1,000 mg (1,000 mg Oral Given 1/24/22 0000)   lidocaine (viscous) (XYLOCAINE) 2 % 15 mL, alum & mag  hydroxide-simethicone (MAALOX) 15 mL GI Cocktail (30 mLs Oral Given 1/24/22 0000)   aspirin (ASA) tablet 325 mg (325 mg Oral Given 1/24/22 0131)        Assessments & Plan (with Medical Decision Making)   MDM:    27 year old M who presents with chest and epigastric abdominal pain.  Labs unremarkable, ECG with no changes, ASA given.  RUQ US with no evidence of acute biliary pathology.  CXR pending at the time of this note.  Anticipate discharge.    Patient signed out to Dr. Woods, who will follow the results of chest XR and disposition the patient appropriately.    I have reviewed the nursing notes. I have reviewed the findings, diagnosis, plan and need for follow up with the patient.    New Prescriptions    No medications on file       Final diagnoses:   Chest pain, unspecified type   Acute abdominal pain       --  I, Suzan Reddy, am serving as a trained medical scribe to document services personally performed by Natividad Nevarez MD, based on the provider's statements to me.     I, Natividad Nevarez MD, was physically present and have reviewed and verified the accuracy of this note documented by Suzan Reddy.    Naitvidad Nevarez MD  [facility] EMERGENCY DEPARTMENT  1/23/2022

## 2022-01-24 NOTE — ED NOTES
Emergency Department Patient Sign-out       Brief HPI:  This is a 27 year old male signed out to me by Dr. Nevarez .  See initial ED Provider note for details of the presentation.            Significant Events prior to my assuming care: Patient with chest pain. Workup negative, cxr pending.      Exam:   Patient Vitals for the past 24 hrs:   BP Temp Temp src Pulse Resp SpO2 Weight   01/23/22 2320 111/74 97.5  F (36.4  C) Oral 83 16 98 % 62.9 kg (138 lb 11.2 oz)           ED RESULTS:   Results for orders placed or performed during the hospital encounter of 01/23/22 (from the past 24 hour(s))   CBC with platelets differential     Status: None    Collection Time: 01/24/22 12:04 AM    Narrative    The following orders were created for panel order CBC with platelets differential.  Procedure                               Abnormality         Status                     ---------                               -----------         ------                     CBC with platelets and d...[[id number]]                      Final result                 Please view results for these tests on the individual orders.   Comprehensive metabolic panel     Status: Normal    Collection Time: 01/24/22 12:04 AM   Result Value Ref Range    Sodium 140 133 - 144 mmol/L    Potassium 3.8 3.4 - 5.3 mmol/L    Chloride 109 94 - 109 mmol/L    Carbon Dioxide (CO2) 26 20 - 32 mmol/L    Anion Gap 5 3 - 14 mmol/L    Urea Nitrogen 15 7 - 30 mg/dL    Creatinine 0.77 0.66 - 1.25 mg/dL    Calcium 8.8 8.5 - 10.1 mg/dL    Glucose 84 70 - 99 mg/dL    Alkaline Phosphatase 41 40 - 150 U/L    AST 17 0 - 45 U/L    ALT 21 0 - 70 U/L    Protein Total 6.8 6.8 - 8.8 g/dL    Albumin 3.8 3.4 - 5.0 g/dL    Bilirubin Total 0.5 0.2 - 1.3 mg/dL    GFR Estimate >90 >60 mL/min/1.73m2   Troponin I     Status: Normal    Collection Time: 01/24/22 12:04 AM   Result Value Ref Range    Troponin I High Sensitivity 4 <79 ng/L   Lipase     Status: Normal    Collection Time: 01/24/22  12:04 AM   Result Value Ref Range    Lipase 95 73 - 393 U/L   CBC with platelets and differential     Status: None    Collection Time: 01/24/22 12:04 AM   Result Value Ref Range    WBC Count 6.9 4.0 - 11.0 10e3/uL    RBC Count 5.02 4.40 - 5.90 10e6/uL    Hemoglobin 14.8 13.3 - 17.7 g/dL    Hematocrit 43.5 40.0 - 53.0 %    MCV 87 78 - 100 fL    MCH 29.5 26.5 - 33.0 pg    MCHC 34.0 31.5 - 36.5 g/dL    RDW 12.8 10.0 - 15.0 %    Platelet Count 302 150 - 450 10e3/uL    % Neutrophils 63 %    % Lymphocytes 24 %    % Monocytes 10 %    % Eosinophils 3 %    % Basophils 0 %    % Immature Granulocytes 0 %    NRBCs per 100 WBC 0 <1 /100    Absolute Neutrophils 4.3 1.6 - 8.3 10e3/uL    Absolute Lymphocytes 1.6 0.8 - 5.3 10e3/uL    Absolute Monocytes 0.7 0.0 - 1.3 10e3/uL    Absolute Eosinophils 0.2 0.0 - 0.7 10e3/uL    Absolute Basophils 0.0 0.0 - 0.2 10e3/uL    Absolute Immature Granulocytes 0.0 <=0.4 10e3/uL    Absolute NRBCs 0.0 10e3/uL   Abdomen US, limited (RUQ only)     Status: None    Collection Time: 01/24/22 12:50 AM    Narrative    EXAM: US ABDOMEN LIMITED  LOCATION: [facility]  DATE/TIME: 1/24/2022 12:23 AM    INDICATION: epigastric abdominal pian  COMPARISON: None.  TECHNIQUE: Limited abdominal ultrasound.    FINDINGS: Technically challenging exam due to uncooperative patient.    GALLBLADDER: Normal. No gallstones, wall thickening, or pericholecystic fluid. Negative sonographic Young's sign.    BILE DUCTS: No biliary dilatation. The common duct measures 4 mm.    LIVER: Normal parenchyma with smooth contour. No focal mass.    RIGHT KIDNEY: No hydronephrosis.    PANCREAS: The visualized portions are normal.    No ascites.      Impression    IMPRESSION:  1.  Normal limited abdominal ultrasound.           ED MEDICATIONS:   Medications   acetaminophen (TYLENOL) tablet 1,000 mg (1,000 mg Oral Given 1/24/22 0000)   lidocaine (viscous) (XYLOCAINE) 2 % 15 mL, alum & mag  hydroxide-simethicone (MAALOX) 15 mL GI Cocktail (30 mLs Oral Given 1/24/22 0000)   aspirin (ASA) tablet 325 mg (325 mg Oral Given 1/24/22 0131)         Impression:  No diagnosis found.    Plan:    Patient with prolonged chest pain.  Work-up is benign with a negative chest x-ray.  Patient otherwise appears well nontoxic.  Covid positive however oxygen saturation is normal and in no distress.  Plan for discharge with follow-up with primary care provider..        MD Chuck Cerna Matthew Joseph, MD  01/24/22 1759

## 2022-01-24 NOTE — DISCHARGE INSTRUCTIONS
Please make an appointment to [facility] (phone: [phone]) as soon as possible.    If Bedasso has discomfort from fever or other pain, he can have:  Acetaminophen (Tylenol) every 6 hours as needed. His dose is:    2 regular strength tabs (650 mg)                                     (43.2+ kg/96+ lb)    NOTE: If your acetaminophen (Tylenol) came with a dropper marked with 0.4 and 0.8 ml, call us ([phone]) or check with your doctor about the dose before using it.     AND/OR      Ibuprofen (Advil, Motrin) every 6 hours as needed. His/her dose is:    2 regular strength tabs (400 mg)                                                                         (40-60 kg/ lb)

## 2022-01-25 ENCOUNTER — HOSPITAL ENCOUNTER (EMERGENCY)
Facility: CLINIC | Age: 27
Discharge: HOME | End: 2022-01-25
Attending: EMERGENCY MEDICINE | Admitting: EMERGENCY MEDICINE
Payer: COMMERCIAL

## 2022-01-25 ENCOUNTER — HOSPITAL ENCOUNTER (EMERGENCY)
Facility: CLINIC | Age: 27
Discharge: HOME | End: 2022-01-26
Attending: EMERGENCY MEDICINE | Admitting: EMERGENCY MEDICINE
Payer: COMMERCIAL

## 2022-01-25 ENCOUNTER — PATIENT OUTREACH (OUTPATIENT)
Dept: CARE COORDINATION | Facility: CLINIC | Age: 27
End: 2022-01-25

## 2022-01-25 VITALS
OXYGEN SATURATION: 99 % | WEIGHT: 138 LBS | DIASTOLIC BLOOD PRESSURE: 84 MMHG | HEART RATE: 87 BPM | BODY MASS INDEX: 25.24 KG/M2 | RESPIRATION RATE: 18 BRPM | SYSTOLIC BLOOD PRESSURE: 122 MMHG | TEMPERATURE: 97.5 F

## 2022-01-25 DIAGNOSIS — R07.9 CHEST PAIN, UNSPECIFIED TYPE: ICD-10-CM

## 2022-01-25 DIAGNOSIS — R07.89 CHEST WALL PAIN: ICD-10-CM

## 2022-01-25 DIAGNOSIS — Z71.89 OTHER SPECIFIED COUNSELING: ICD-10-CM

## 2022-01-25 LAB
ALBUMIN SERPL-MCNC: 4 G/DL (ref 3.4–5)
ALP SERPL-CCNC: 52 U/L (ref 40–150)
ALT SERPL W P-5'-P-CCNC: 23 U/L (ref 0–70)
ANION GAP SERPL CALCULATED.3IONS-SCNC: 6 MMOL/L (ref 3–14)
AST SERPL W P-5'-P-CCNC: 18 U/L (ref 0–45)
ATRIAL RATE - MUSE: 61 BPM
ATRIAL RATE - MUSE: 69 BPM
BASOPHILS # BLD AUTO: 0 10E3/UL (ref 0–0.2)
BASOPHILS NFR BLD AUTO: 1 %
BILIRUB SERPL-MCNC: 0.5 MG/DL (ref 0.2–1.3)
BUN SERPL-MCNC: 14 MG/DL (ref 7–30)
CALCIUM SERPL-MCNC: 8.8 MG/DL (ref 8.5–10.1)
CHLORIDE BLD-SCNC: 106 MMOL/L (ref 94–109)
CO2 SERPL-SCNC: 27 MMOL/L (ref 20–32)
CREAT SERPL-MCNC: 0.74 MG/DL (ref 0.66–1.25)
DIASTOLIC BLOOD PRESSURE - MUSE: NORMAL MMHG
DIASTOLIC BLOOD PRESSURE - MUSE: NORMAL MMHG
EOSINOPHIL # BLD AUTO: 0.4 10E3/UL (ref 0–0.7)
EOSINOPHIL NFR BLD AUTO: 6 %
ERYTHROCYTE [DISTWIDTH] IN BLOOD BY AUTOMATED COUNT: 13.1 % (ref 10–15)
GFR SERPL CREATININE-BSD FRML MDRD: >90 ML/MIN/1.73M2
GLUCOSE BLD-MCNC: 102 MG/DL (ref 70–99)
HCT VFR BLD AUTO: 47.2 % (ref 40–53)
HGB BLD-MCNC: 15.5 G/DL (ref 13.3–17.7)
HOLD SPECIMEN: NORMAL
IMM GRANULOCYTES # BLD: 0 10E3/UL
IMM GRANULOCYTES NFR BLD: 0 %
INTERPRETATION ECG - MUSE: NORMAL
INTERPRETATION ECG - MUSE: NORMAL
LYMPHOCYTES # BLD AUTO: 2.1 10E3/UL (ref 0.8–5.3)
LYMPHOCYTES NFR BLD AUTO: 32 %
MCH RBC QN AUTO: 28.8 PG (ref 26.5–33)
MCHC RBC AUTO-ENTMCNC: 32.8 G/DL (ref 31.5–36.5)
MCV RBC AUTO: 88 FL (ref 78–100)
MONOCYTES # BLD AUTO: 0.8 10E3/UL (ref 0–1.3)
MONOCYTES NFR BLD AUTO: 11 %
NEUTROPHILS # BLD AUTO: 3.4 10E3/UL (ref 1.6–8.3)
NEUTROPHILS NFR BLD AUTO: 50 %
NRBC # BLD AUTO: 0 10E3/UL
NRBC BLD AUTO-RTO: 0 /100
P AXIS - MUSE: 31 DEGREES
P AXIS - MUSE: 40 DEGREES
PLATELET # BLD AUTO: 346 10E3/UL (ref 150–450)
POTASSIUM BLD-SCNC: 3.8 MMOL/L (ref 3.4–5.3)
PR INTERVAL - MUSE: 146 MS
PR INTERVAL - MUSE: 150 MS
PROT SERPL-MCNC: 7.1 G/DL (ref 6.8–8.8)
QRS DURATION - MUSE: 80 MS
QRS DURATION - MUSE: 82 MS
QT - MUSE: 348 MS
QT - MUSE: 378 MS
QTC - MUSE: 372 MS
QTC - MUSE: 380 MS
R AXIS - MUSE: -10 DEGREES
R AXIS - MUSE: -6 DEGREES
RBC # BLD AUTO: 5.38 10E6/UL (ref 4.4–5.9)
SODIUM SERPL-SCNC: 139 MMOL/L (ref 133–144)
SYSTOLIC BLOOD PRESSURE - MUSE: NORMAL MMHG
SYSTOLIC BLOOD PRESSURE - MUSE: NORMAL MMHG
T AXIS - MUSE: 35 DEGREES
T AXIS - MUSE: 37 DEGREES
TROPONIN I SERPL HS-MCNC: 4 NG/L
VENTRICULAR RATE- MUSE: 61 BPM
VENTRICULAR RATE- MUSE: 69 BPM
WBC # BLD AUTO: 6.7 10E3/UL (ref 4–11)

## 2022-01-25 PROCEDURE — 85004 AUTOMATED DIFF WBC COUNT: CPT | Performed by: EMERGENCY MEDICINE

## 2022-01-25 PROCEDURE — 36415 COLL VENOUS BLD VENIPUNCTURE: CPT | Performed by: EMERGENCY MEDICINE

## 2022-01-25 PROCEDURE — 99284 EMERGENCY DEPT VISIT MOD MDM: CPT | Performed by: EMERGENCY MEDICINE

## 2022-01-25 PROCEDURE — 250N000013 HC RX MED GY IP 250 OP 250 PS 637: Performed by: EMERGENCY MEDICINE

## 2022-01-25 PROCEDURE — 93005 ELECTROCARDIOGRAM TRACING: CPT | Performed by: EMERGENCY MEDICINE

## 2022-01-25 PROCEDURE — 84484 ASSAY OF TROPONIN QUANT: CPT | Performed by: EMERGENCY MEDICINE

## 2022-01-25 PROCEDURE — 80053 COMPREHEN METABOLIC PANEL: CPT | Performed by: EMERGENCY MEDICINE

## 2022-01-25 PROCEDURE — 99284 EMERGENCY DEPT VISIT MOD MDM: CPT | Mod: 25 | Performed by: EMERGENCY MEDICINE

## 2022-01-25 RX ORDER — ACETAMINOPHEN 500 MG
1000 TABLET ORAL ONCE
Status: COMPLETED | OUTPATIENT
Start: 2022-01-25 | End: 2022-01-25

## 2022-01-25 RX ORDER — IBUPROFEN 600 MG/1
600 TABLET, FILM COATED ORAL ONCE
Status: COMPLETED | OUTPATIENT
Start: 2022-01-25 | End: 2022-01-25

## 2022-01-25 RX ADMIN — ACETAMINOPHEN 1000 MG: 500 TABLET ORAL at 01:10

## 2022-01-25 RX ADMIN — IBUPROFEN 600 MG: 600 TABLET ORAL at 01:10

## 2022-01-25 NOTE — DISCHARGE INSTRUCTIONS
Please make an appointment to [facility] with [facility] (phone: [phone]) as soon as possible.    If Bedasso has discomfort from fever or other pain, he can have:  Acetaminophen (Tylenol) every 6 hours as needed. His dose is:    2 regular strength tabs (650 mg)                                     (43.2+ kg/96+ lb)    NOTE: If your acetaminophen (Tylenol) came with a dropper marked with 0.4 and 0.8 ml, call us ([phone]) or check with your doctor about the dose before using it.     AND/OR      Ibuprofen (Advil, Motrin) every 6 hours as needed. His/her dose is:    2 regular strength tabs (400 mg)                                                                         (40-60 kg/ lb)

## 2022-01-25 NOTE — PROGRESS NOTES
Clinic Care Coordination Contact    Background: Care Coordination referral placed from [facility] discharge report for reason of patient meeting criteria for a TCM outreach call by [facility] team.    Assessment: Upon chart review, [facility] Team member will cancel/close the referral for TCM outreach due to reason below:    Per chart review, patient states he doesn't have a phone and there are no alternative numbers listed.    Plan: Care Coordination referral for TCM outreach canceled.    ZA Jackson  [facility], [facility]

## 2022-01-25 NOTE — ED PROVIDER NOTES
"    [facility] [place]    1/25/22        History     Chief Complaint   Patient presents with     Chest Wall Pain     x 1 week,  Pain in chest hurts when pt. pushes on it     HPI  Ravindra Oro is a 27 year old male with a history of schizoaffective disorder, homeslessness who presents to the Emergency Department with chest wall pain. Patient reports chest wall pain bilaterally for about 1 month. He states sometimes it is every day and sometimes it is only once a week. He endorses radiation to the back but states it is not as bad as his chest. Patient reports chest is painful to the touch, worse when he presses on it. He states it feels like an injury. Patient reports being in the cold sometimes worsens his pain. Patient states his breathing is ok but sometimes it feels like his \"heart is going to explode.\" He denies known cardiac issues. Patient does not have a PCP. Per chart review, patient has been seen for this multiple times in the past month and his workup has been normal. The patient would like a surgery. No SOB, leg swelling, calf pain, hx of dvt/pe.    Past Medical History  Past Medical History:   Diagnosis Date     Depressive disorder      Psychosis (H)      Schizoaffective disorder (H)      History reviewed. No pertinent surgical history.  acetaminophen (TYLENOL) 500 MG tablet  cholecalciferol (VITAMIN D) 1000 UNIT tablet  cyanocobalamin (VITAMIN B-12) 1000 MCG tablet  famotidine (PEPCID) 20 MG tablet  Fluocinolone Acetonide (DERMA-SMOOTHE/FS SCALP) 0.01 % OIL  FLUoxetine (PROZAC) 10 MG capsule  FLUoxetine (PROZAC) 20 MG capsule  fluticasone (FLONASE) 50 MCG/ACT nasal spray  ibuprofen (ADVIL/MOTRIN) 800 MG tablet  ketoconazole (NIZORAL) 2 % shampoo  mirtazapine (REMERON) 30 MG tablet  OLANZapine (ZYPREXA) 5 MG tablet  OLANZapine zydis (ZYPREXA) 5 MG ODT  sertraline (ZOLOFT) 50 MG tablet  acetaminophen (TYLENOL) 325 MG tablet  triamcinolone (KENALOG) 0.025 % external " ointment      No Known Allergies  Family History  Family History   Problem Relation Age of Onset     Cancer No family hx of         No family history of skin cancer     Melanoma No family hx of      Skin Cancer No family hx of      Social History   Social History     Tobacco Use     Smoking status: Never Smoker     Smokeless tobacco: Never Used   Substance Use Topics     Alcohol use: Never     Drug use: Not Currently     Types: Marijuana      Past medical history, past surgical history, medications, allergies, family history, and social history were reviewed with the patient. No additional pertinent items.       Review of Systems   ROS: 14 point ROS neg other than the symptoms noted above in the HPI.  A complete review of systems was performed with pertinent positives and negatives noted in the HPI, and all other systems negative.    Physical Exam   BP: 113/73  Pulse: 94  Temp: 97.7  F (36.5  C)  Resp: 18  Weight: 62.6 kg (138 lb)  SpO2: 99 %  Physical Exam  Physical Exam   Constitutional: oriented to person, place, and time. appears well-developed and well-nourished.   HENT:   Head: Normocephalic and atraumatic.   Neck: Normal range of motion.   Pulmonary/Chest: Effort normal. No respiratory distress. Clear lungs bilaterally. TTP over anterior chest wall throughout. No signs of trauma.  Cardiac: No murmurs, rubs, gallops. RRR.  Abdominal: Abdomen soft, nontender, nondistended. No rebound tenderness.  MSK: Long bones without deformity or evidence of trauma. No TTP over the claves, no lower extremity swelling.  Neurological: alert and oriented to person, place, and time.   Skin: Skin is warm and dry.   Psychiatric:  normal mood and affect.  behavior is normal. Thought content normal.     ED Course   12:32 AM  The patient was seen and examined by Justino Woods   in [facility].      Procedures            EKG Interpretation:      Interpreted by Justino Woods MD  Time reviewed: 0100  Symptoms at time of EKG: chest pain    Rhythm: normal sinus   Rate: normal  Axis: normal  Ectopy: none  Conduction: normal  ST Segments/ T Waves: No ST-T wave changes  Q Waves: none  Comparison to prior: Unchanged    Clinical Impression: no acute changes from prior                    No results found for any visits on 01/25/22.  Medications - No data to display     Assessments & Plan (with Medical Decision Making)   MDM  Patient presenting with chest wall pain. Has been seen multiple times over the past month for this. He has reproducible tenderness. Low risk by Wells and negative PERC, very likely pulmonary embolism. The patient is not tachycardic or hypoxic or complaining of shortness of breath. No positional change/rubs to suggest pericarditis/myocarditis. Examination is reassuring, otherwise tenderness palpation which is been present for several presentations. Chest x-ray done yesterday which is clear. ECG without acute changes from prior    Re eval: Patient resting comfortably without pain. Troponin negative. Patient ate. States he is feeling better. Pain most likely MSK related. I strongly encouraged PCP followup and conservative pain management. Patient states he has a warm place to go on discharge.    I have reviewed the nursing notes. I have reviewed the findings, diagnosis, plan and need for follow up with the patient.    New Prescriptions    No medications on file       Final diagnoses:   Chest wall pain     I, Annelise Cordova am serving as a trained medical scribe to document services personally performed by Justino Woods MD, based on the provider's statements to me.      I,  Justino Woods MD, was physically present and have reviewed and verified the accuracy of this note documented by Annelise Cordova.     --  Justino Woods MD  [facility] EMERGENCY DEPARTMENT  1/24/2022     Justino Woods MD  01/25/22 0415       Justino Woods MD  01/25/22 0429

## 2022-01-25 NOTE — ED TRIAGE NOTES
"Pt. was a bus stop and called EMS do to having chest pain.  Pt. states has had for 1 week and is reproducible with palpation.   No shortness of breath.  Also states hands and feet was cold.  States did feel like he was going to \"pass out.\"  "

## 2022-01-26 ENCOUNTER — APPOINTMENT (OUTPATIENT)
Dept: GENERAL RADIOLOGY | Facility: CLINIC | Age: 27
End: 2022-01-26
Attending: EMERGENCY MEDICINE
Payer: COMMERCIAL

## 2022-01-26 ENCOUNTER — HOSPITAL ENCOUNTER (EMERGENCY)
Facility: CLINIC | Age: 27
Discharge: HOME | End: 2022-01-26
Attending: FAMILY MEDICINE | Admitting: FAMILY MEDICINE
Payer: COMMERCIAL

## 2022-01-26 VITALS
TEMPERATURE: 97.9 F | SYSTOLIC BLOOD PRESSURE: 117 MMHG | WEIGHT: 141 LBS | BODY MASS INDEX: 25.79 KG/M2 | OXYGEN SATURATION: 100 % | DIASTOLIC BLOOD PRESSURE: 82 MMHG | HEART RATE: 63 BPM | RESPIRATION RATE: 16 BRPM

## 2022-01-26 VITALS
DIASTOLIC BLOOD PRESSURE: 78 MMHG | SYSTOLIC BLOOD PRESSURE: 110 MMHG | HEART RATE: 91 BPM | WEIGHT: 143.5 LBS | TEMPERATURE: 98.3 F | OXYGEN SATURATION: 99 % | RESPIRATION RATE: 24 BRPM | BODY MASS INDEX: 26.25 KG/M2

## 2022-01-26 DIAGNOSIS — R07.89 CHEST WALL PAIN: ICD-10-CM

## 2022-01-26 DIAGNOSIS — F25.9 SCHIZOAFFECTIVE DISORDER, UNSPECIFIED TYPE (H): ICD-10-CM

## 2022-01-26 DIAGNOSIS — Z76.5 MALINGERING: ICD-10-CM

## 2022-01-26 LAB
ATRIAL RATE - MUSE: 74 BPM
DIASTOLIC BLOOD PRESSURE - MUSE: NORMAL MMHG
INTERPRETATION ECG - MUSE: NORMAL
P AXIS - MUSE: 55 DEGREES
PR INTERVAL - MUSE: 142 MS
QRS DURATION - MUSE: 80 MS
QT - MUSE: 360 MS
QTC - MUSE: 399 MS
R AXIS - MUSE: 0 DEGREES
SYSTOLIC BLOOD PRESSURE - MUSE: NORMAL MMHG
T AXIS - MUSE: 46 DEGREES
VENTRICULAR RATE- MUSE: 74 BPM

## 2022-01-26 PROCEDURE — 99282 EMERGENCY DEPT VISIT SF MDM: CPT | Mod: 25 | Performed by: FAMILY MEDICINE

## 2022-01-26 PROCEDURE — 99283 EMERGENCY DEPT VISIT LOW MDM: CPT | Performed by: FAMILY MEDICINE

## 2022-01-26 PROCEDURE — 93010 ELECTROCARDIOGRAM REPORT: CPT | Performed by: FAMILY MEDICINE

## 2022-01-26 PROCEDURE — 71046 X-RAY EXAM CHEST 2 VIEWS: CPT

## 2022-01-26 PROCEDURE — 93005 ELECTROCARDIOGRAM TRACING: CPT | Performed by: FAMILY MEDICINE

## 2022-01-26 NOTE — LETTER
January 26, 2022      To Whom It May Concern:      Ravindra Oro was seen in our Emergency Department today, 01/26/22.     Sincerely,        Melvin Mcdonald MD

## 2022-01-26 NOTE — ED PROVIDER NOTES
[facility] [place]  1/25/22    History     Chief Complaint   Patient presents with     Chest Pain     pt states upper chest pain     HPI  Ravindra Oro is a 27 year old male with PMH significant for schizoaffective disorder, homelessness, and recent COVID-19 infection who presents to the Emergency Department for evaluation of upper chest pain.  Patient endorses chest pain is substernal diffuse radiating to the back.  Has been ongoing for many weeks.  Believes he needs to have surgery to explore the etiology.    At time of my interview the patient is very comfortable appearing and sleeping required gentle nudging to arouse.  No fevers or chills no vomiting no diarrhea.  No abdominal pain.  Not short of breath today.    Per review of the chart, patient has visited the ED 15 times this month with varying complaints including chest pain, abdominal pain, Covid, and psychosis.    Patient's chest pain work-up has been thorough including negative troponins, negative chest x-ray and EKGs which have been nonischemic.  He was last seen for chest pain 2 days ago.    Past Medical History  Past Medical History:   Diagnosis Date     Depressive disorder      Psychosis (H)      Schizoaffective disorder (H)      History reviewed. No pertinent surgical history.  acetaminophen (TYLENOL) 325 MG tablet  acetaminophen (TYLENOL) 500 MG tablet  cholecalciferol (VITAMIN D) 1000 UNIT tablet  cyanocobalamin (VITAMIN B-12) 1000 MCG tablet  famotidine (PEPCID) 20 MG tablet  Fluocinolone Acetonide (DERMA-SMOOTHE/FS SCALP) 0.01 % OIL  FLUoxetine (PROZAC) 10 MG capsule  FLUoxetine (PROZAC) 20 MG capsule  fluticasone (FLONASE) 50 MCG/ACT nasal spray  ibuprofen (ADVIL/MOTRIN) 800 MG tablet  ketoconazole (NIZORAL) 2 % shampoo  mirtazapine (REMERON) 30 MG tablet  OLANZapine (ZYPREXA) 5 MG tablet  OLANZapine zydis (ZYPREXA) 5 MG ODT  sertraline (ZOLOFT) 50 MG tablet  triamcinolone (KENALOG) 0.025 % external  ointment      No Known Allergies  Past medical history, past surgical history, medications, and allergies were reviewed with the patient. Additional pertinent items: None    Family History  Family History   Problem Relation Age of Onset     Cancer No family hx of         No family history of skin cancer     Melanoma No family hx of      Skin Cancer No family hx of      Family history was reviewed with the patient. Additional pertinent items: None    Social History  Social History     Tobacco Use     Smoking status: Never Smoker     Smokeless tobacco: Never Used   Substance Use Topics     Alcohol use: Never     Drug use: Not Currently     Types: Marijuana      Social history was reviewed with the patient. Additional pertinent items: None      Review of Systems   10 point review of symptoms was performed and is negative except as noted above.     Physical Exam   BP: 110/78  Pulse: 60  Temp: 98.3  F (36.8  C)  Resp: 18  Weight: 65.1 kg (143 lb 8 oz)  SpO2: 100 %  Physical Exam     GEN: Well appearing, non toxic, cooperative and conversant.   HEENT: The head is normocephalic and atraumatic. Pupils are equal round and reactive to light. Extraocular motions are intact. There is no facial swelling. The neck is nontender and supple.   CV: Regular rate and rhythm without murmurs rubs or gallops. 2+ radial pulses bilaterally.  PULM: Clear to auscultation bilaterally.  ABD: Soft, nontender, nondistended.   EXT: Full range of motion.  No edema.  NEURO: Cranial nerves II through XII are intact and symmetric. Bilateral upper and lower extremities grossly show full range of motion without any focal deficits.   SKIN: No rashes, ecchymosis, or lacerations  PSYCH: Calm and cooperative, interactive.     ED Course      Procedures                     Results for orders placed or performed during the hospital encounter of 01/25/22   XR Chest 2 Views     Status: None    Narrative    EXAM: XR CHEST 2 VW  LOCATION: [facility]  [facility]  DATE/TIME: 1/26/2022 12:29 AM    INDICATION: Chest pain  COMPARISON: 1/24/2022      Impression    IMPRESSION: Question some mild streaky bilateral perihilar opacities and peribronchial thickening which could be seen with bronchiolitis. No acute appearing infiltrates or consolidation to suggest pneumonitis. Normal heart size and pulmonary vascularity.   No significant bony abnormalities.   EKG 12 lead     Status: None   Result Value Ref Range    Systolic Blood Pressure  mmHg    Diastolic Blood Pressure  mmHg    Ventricular Rate 69 BPM    Atrial Rate 69 BPM    OR Interval 146 ms    QRS Duration 80 ms     ms    QTc 372 ms    P Axis 40 degrees    R AXIS -10 degrees    T Axis 37 degrees    Interpretation ECG       Sinus rhythm  Minimal voltage criteria for LVH, may be normal variant  Borderline ECG  Unconfirmed report - interpretation of this ECG is computer generated - see medical record for final interpretation  Confirmed by - EMERGENCY ROOM, PHYSICIAN ([id number]),  QIAN SMITH ([id number]) on 1/25/2022 8:16:31 PM       Medications - No data to display     Assessments & Plan (with Medical Decision Making)   27-year-old male presenting for chest pain, recurrent visit with similar complaint.    Ddx   Acute coronary syndrome, pulmonary embolism, pneumonia, pneumothorax, congestive heart failure, uremia, renal failure, among other causes    Has had an extensive negative evaluation for emergent etiologies of chest pain recently and symptomatology today is not significantly different.  Sleeping and comfortable throughout emergency department stay easily arousable nontoxic.    - Patient agrees to our plan and is ready for discharge. Care plan, follow up plan, and reasons to return immediately to the ED were dicussed in detail and summarized as noted in the discharge instructions.      I have reviewed the nursing notes. I have reviewed the findings, diagnosis, plan and need for  follow up with the patient.    New Prescriptions    No medications on file       Final diagnoses:   Chest pain, unspecified type     I, Rahul Rebollar, am serving as a trained medical scribe to document services personally performed by Leon Obando MD, based on the provider's statements to me.     I, Leon Obando MD, was physically present and have reviewed and verified the accuracy of this note documented by Rahul Rebollar.    --  Leon Obando MD  [facility] EMERGENCY DEPARTMENT  1/25/2022     Leon Obando MD  01/26/22 0520

## 2022-01-26 NOTE — ED TRIAGE NOTES
"Pt states he is having upper chest pain.  Pt has a recent positive covid status.  Pt had old EKG stickers on chest stating, \"I like to keep them on they comfort me.\"  Pt states he is allergic to outside air sometimes.  Pt makes bizarre comments.    "

## 2022-01-26 NOTE — ED NOTES
Pt was laying on his stomach was asked to changed into a gown. Pt taking a long time to get dress into a gown.

## 2022-01-27 ENCOUNTER — HOSPITAL ENCOUNTER (EMERGENCY)
Facility: CLINIC | Age: 27
Discharge: HOME | End: 2022-01-27
Attending: EMERGENCY MEDICINE | Admitting: EMERGENCY MEDICINE
Payer: COMMERCIAL

## 2022-01-27 VITALS
DIASTOLIC BLOOD PRESSURE: 67 MMHG | RESPIRATION RATE: 16 BRPM | OXYGEN SATURATION: 99 % | BODY MASS INDEX: 25.79 KG/M2 | SYSTOLIC BLOOD PRESSURE: 109 MMHG | TEMPERATURE: 97.7 F | WEIGHT: 141 LBS | HEART RATE: 57 BPM

## 2022-01-27 DIAGNOSIS — R07.89 CHEST WALL PAIN: ICD-10-CM

## 2022-01-27 PROCEDURE — 93005 ELECTROCARDIOGRAM TRACING: CPT | Performed by: EMERGENCY MEDICINE

## 2022-01-27 PROCEDURE — 99284 EMERGENCY DEPT VISIT MOD MDM: CPT | Mod: 25 | Performed by: EMERGENCY MEDICINE

## 2022-01-27 PROCEDURE — 99283 EMERGENCY DEPT VISIT LOW MDM: CPT | Performed by: EMERGENCY MEDICINE

## 2022-01-27 PROCEDURE — 93010 ELECTROCARDIOGRAM REPORT: CPT | Performed by: EMERGENCY MEDICINE

## 2022-01-27 ASSESSMENT — ENCOUNTER SYMPTOMS
BACK PAIN: 0
NECK PAIN: 0
DYSPHORIC MOOD: 0
SLEEP DISTURBANCE: 0
SHORTNESS OF BREATH: 0
ABDOMINAL PAIN: 0
FEVER: 0
DIFFICULTY URINATING: 0
HEADACHES: 0
COUGH: 0
EYE REDNESS: 0
NAUSEA: 0
SORE THROAT: 0
WEAKNESS: 0
VOMITING: 0

## 2022-01-27 NOTE — DISCHARGE INSTRUCTIONS
Discharge to home with plans to follow-up with outpatient providers at [facility] once again reviewing use of primary care versus emergency room.

## 2022-01-27 NOTE — ED PROVIDER NOTES
"ED Provider Note  [facility]      History     Chief Complaint   Patient presents with     Chest Pain     started having chest pain this morning while at work, pt asking for work note. CP \" comes and goes, upper chest\"     HPI  Bedasso RICHARD Oro is a 27 year old male with PMH significant for schizoaffective disorder, homelessness, and recent COVID-19 infection who presents to the Emergency Department for evaluation of chest pain. Patient reports onset of upper chest pain this morning while at work. Pain has been intermittent since. He is requesting a work note.     Per review of the chart, patient has visited the ED 15 times this month with varying complaints including chest pain, abdominal pain, Covid, and psychosis. Patient's chest pain work-up has been thorough including negative troponins, negative chest x-ray and EKGs which have been nonischemic.     He was seen twice yesterday for chest pain. Symptomatology was not significantly different and he was sleeping comfortably throughout his ED stay until discharged.     Past Medical History  Past Medical History:   Diagnosis Date     Depressive disorder      Psychosis (H)      Schizoaffective disorder (H)      No past surgical history on file.  acetaminophen (TYLENOL) 325 MG tablet  acetaminophen (TYLENOL) 500 MG tablet  cholecalciferol (VITAMIN D) 1000 UNIT tablet  cyanocobalamin (VITAMIN B-12) 1000 MCG tablet  famotidine (PEPCID) 20 MG tablet  Fluocinolone Acetonide (DERMA-SMOOTHE/FS SCALP) 0.01 % OIL  FLUoxetine (PROZAC) 10 MG capsule  FLUoxetine (PROZAC) 20 MG capsule  fluticasone (FLONASE) 50 MCG/ACT nasal spray  ibuprofen (ADVIL/MOTRIN) 800 MG tablet  ketoconazole (NIZORAL) 2 % shampoo  mirtazapine (REMERON) 30 MG tablet  OLANZapine (ZYPREXA) 5 MG tablet  OLANZapine zydis (ZYPREXA) 5 MG ODT  sertraline (ZOLOFT) 50 MG tablet  triamcinolone (KENALOG) 0.025 % external ointment      No Known Allergies  Family History  Family History "   Problem Relation Age of Onset     Cancer No family hx of         No family history of skin cancer     Melanoma No family hx of      Skin Cancer No family hx of      Social History   Social History     Tobacco Use     Smoking status: Never Smoker     Smokeless tobacco: Never Used   Substance Use Topics     Alcohol use: Never     Drug use: Not Currently     Types: Marijuana      Past medical history, past surgical history, medications, allergies, family history, and social history were reviewed with the patient. No additional pertinent items.       Review of Systems  A complete review of systems was performed with pertinent positives and negatives noted in the HPI, and all other systems negative.    Physical Exam   BP: 117/82  Pulse: 63  Temp: 97.9  F (36.6  C)  Resp: 16  Weight: 64 kg (141 lb)  SpO2: 100 %  Physical Exam  Constitutional:       General: He is not in acute distress.     Appearance: He is not diaphoretic.   HENT:      Head: Atraumatic.   Eyes:      General: No scleral icterus.     Pupils: Pupils are equal, round, and reactive to light.   Cardiovascular:      Heart sounds: Normal heart sounds.   Pulmonary:      Effort: No respiratory distress.      Breath sounds: Normal breath sounds.   Chest:      Chest wall: Tenderness present.   Abdominal:      General: Bowel sounds are normal.      Palpations: Abdomen is soft.      Tenderness: There is no guarding or rebound.   Musculoskeletal:         General: No tenderness.   Skin:     General: Skin is warm.      Findings: No rash.         ED Course      Procedures         EKG Interpretation:      Interpreted by Melvin Mcdonald MD  Time reviewed: 1905  Symptoms at time of EKG: chest wall pain   Rhythm: normal sinus   Rate: normal  Axis: normal  Ectopy: none  Conduction: normal  ST Segments/ T Waves: No ST-T wave changes  Q Waves: none  Comparison to prior: Unchanged    Clinical Impression: normal EKG                    Results for orders placed or  performed during the hospital encounter of 01/26/22   EKG 12 lead     Status: None (Preliminary result)   Result Value Ref Range    Systolic Blood Pressure  mmHg    Diastolic Blood Pressure  mmHg    Ventricular Rate 74 BPM    Atrial Rate 74 BPM    HI Interval 142 ms    QRS Duration 80 ms     ms    QTc 399 ms    P Axis 55 degrees    R AXIS 0 degrees    T Axis 46 degrees    Interpretation ECG Sinus rhythm  Normal ECG      Results for orders placed or performed during the hospital encounter of 01/25/22   XR Chest 2 Views     Status: None    Narrative    EXAM: XR CHEST 2 VW  LOCATION: [facility]  DATE/TIME: 1/26/2022 12:29 AM    INDICATION: Chest pain  COMPARISON: 1/24/2022      Impression    IMPRESSION: Question some mild streaky bilateral perihilar opacities and peribronchial thickening which could be seen with bronchiolitis. No acute appearing infiltrates or consolidation to suggest pneumonitis. Normal heart size and pulmonary vascularity.   No significant bony abnormalities.   EKG 12 lead     Status: None   Result Value Ref Range    Systolic Blood Pressure  mmHg    Diastolic Blood Pressure  mmHg    Ventricular Rate 69 BPM    Atrial Rate 69 BPM    HI Interval 146 ms    QRS Duration 80 ms     ms    QTc 372 ms    P Axis 40 degrees    R AXIS -10 degrees    T Axis 37 degrees    Interpretation ECG       Sinus rhythm  Minimal voltage criteria for LVH, may be normal variant  Borderline ECG  Unconfirmed report - interpretation of this ECG is computer generated - see medical record for final interpretation  Confirmed by - EMERGENCY ROOM, PHYSICIAN ([id number]),  QIAN SMITH ([id number]) on 1/25/2022 8:16:31 PM       Medications - No data to display     Assessments & Plan (with Medical Decision Making)       I have reviewed the nursing notes. I have reviewed the findings, diagnosis, plan and need for follow up with the patient.    Patient with repeated ER visits now with  once again chest wall pain he has a history of schizoaffective disorder I believe that this represents malingering behaviors he had an EKG that was negative he had an extensive work-up over the past 2 days all of which has been negative for any acute cardiac or pulmonary issue I had an extensive discussion with the patient about the importance of utilizing his primary clinic and he will be following up [facility] this [facility].    Final diagnoses:   Chest wall pain   Schizoaffective disorder, unspecified type (H)   Malingering       --  Melvin Mcdonald  [facility] EMERGENCY DEPARTMENT  1/26/2022     Melvin Mcdonald MD  01/26/22 1926

## 2022-01-28 NOTE — ED PROVIDER NOTES
"ED Provider Note  [facility]      History     Chief Complaint   Patient presents with     Chest Pain     \"my chest hurts, and you need to do surgery to find out what's wrong\"     HPI  Ravindra Oro is a 27 year old male who presents to the emergency department for evaluation of ongoing chest pain.  The patient states that he has had chest pain in the anterior chest for approximately 2 weeks.  He states it hurts worse when he pushes on it.  He denies any dyspnea.  No fever.  No cough.  He denies any exertional exacerbation.  He denies any pleuritic component.  No hemoptysis.  No dyspnea.  No leg pain or swelling.  Patient denies any abdominal pain.  No nausea, vomiting, or diarrhea.  Patient has been seen multiple times in several emergency departments over the past several days.  He has undergone diagnostic evaluation with EKG, chest radiograph, labs including troponin.  These studies have been unrevealing.  He denies homelessness.  He states that he lives in a group home.  He denies any acute mental health concerns.    Past Medical History  Past Medical History:   Diagnosis Date     Depressive disorder      Psychosis (H)      Schizoaffective disorder (H)      History reviewed. No pertinent surgical history.  acetaminophen (TYLENOL) 325 MG tablet  acetaminophen (TYLENOL) 500 MG tablet  cholecalciferol (VITAMIN D) 1000 UNIT tablet  cyanocobalamin (VITAMIN B-12) 1000 MCG tablet  famotidine (PEPCID) 20 MG tablet  Fluocinolone Acetonide (DERMA-SMOOTHE/FS SCALP) 0.01 % OIL  FLUoxetine (PROZAC) 10 MG capsule  FLUoxetine (PROZAC) 20 MG capsule  fluticasone (FLONASE) 50 MCG/ACT nasal spray  ibuprofen (ADVIL/MOTRIN) 800 MG tablet  ketoconazole (NIZORAL) 2 % shampoo  mirtazapine (REMERON) 30 MG tablet  OLANZapine (ZYPREXA) 5 MG tablet  OLANZapine zydis (ZYPREXA) 5 MG ODT  sertraline (ZOLOFT) 50 MG tablet  triamcinolone (KENALOG) 0.025 % external ointment      No Known Allergies  Family " History  Family History   Problem Relation Age of Onset     Cancer No family hx of         No family history of skin cancer     Melanoma No family hx of      Skin Cancer No family hx of      Social History   Social History     Tobacco Use     Smoking status: Never Smoker     Smokeless tobacco: Never Used   Substance Use Topics     Alcohol use: Never     Drug use: Not Currently     Types: Marijuana      Past medical history, past surgical history, medications, allergies, family history, and social history were reviewed with the patient. No additional pertinent items.       Review of Systems   Constitutional: Negative for fever.   HENT: Negative for congestion and sore throat.    Eyes: Negative for redness.   Respiratory: Negative for cough and shortness of breath.    Cardiovascular: Positive for chest pain.   Gastrointestinal: Negative for abdominal pain, nausea and vomiting.   Genitourinary: Negative for difficulty urinating.   Musculoskeletal: Negative for back pain and neck pain.   Skin: Negative for rash.   Neurological: Negative for weakness and headaches.   Psychiatric/Behavioral: Negative for dysphoric mood, sleep disturbance and suicidal ideas.   All other systems reviewed and are negative.    A complete review of systems was performed with pertinent positives and negatives noted in the HPI, and all other systems negative.    Physical Exam   BP: 109/67  Pulse: 57  Temp: 97.7  F (36.5  C)  Resp: 16  Weight: 64 kg (141 lb)  SpO2: 99 %  Physical Exam  Vitals and nursing note reviewed.   Constitutional:       General: He is not in acute distress.     Appearance: He is well-developed. He is not diaphoretic.   HENT:      Head: Atraumatic.   Eyes:      General: No scleral icterus.     Pupils: Pupils are equal, round, and reactive to light.   Cardiovascular:      Rate and Rhythm: Normal rate and regular rhythm.      Heart sounds: Normal heart sounds.   Pulmonary:      Effort: No respiratory distress.      Breath  sounds: Normal breath sounds.   Abdominal:      General: Bowel sounds are normal.      Palpations: Abdomen is soft.      Tenderness: There is no abdominal tenderness.   Musculoskeletal:         General: No tenderness.   Skin:     General: Skin is warm and dry.      Findings: No rash.   Neurological:      Mental Status: He is alert.         ED Course      Procedures            EKG Interpretation:      Interpreted by NAYAN ALAN MD, MD  Time reviewed: 2124  Symptoms at time of EKG: chest pain   Rhythm: sinus bradycardia  Rate: 58  Axis: normal  Ectopy: none  Conduction: normal  ST Segments/ T Waves: No ST-T wave changes  Q Waves: none  Comparison to prior: Unchanged    Clinical Impression: normal EKG                    Results for orders placed or performed during the hospital encounter of 01/27/22   EKG 12 lead     Status: None (Preliminary result)   Result Value Ref Range    Systolic Blood Pressure  mmHg    Diastolic Blood Pressure  mmHg    Ventricular Rate 58 BPM    Atrial Rate 58 BPM    ID Interval 150 ms    QRS Duration 84 ms     ms    QTc 382 ms    P Axis 44 degrees    R AXIS 1 degrees    T Axis 22 degrees    Interpretation ECG Sinus bradycardia  Otherwise normal ECG        Medications - No data to display     Assessments & Plan (with Medical Decision Making)   27 year old male with underlying schizoaffective disorder to the emergency department with ongoing chest pain.  His chest pain appears to be chest wall pain.  He has undergone diagnostic evaluation at other emergency departments over the past couple of weeks which have been unrevealing.  His EKG again does not reveal any acute ischemic change.  He does not have symptoms concerning for pulmonary embolism including no cough, hemoptysis, dyspnea, leg pain or swelling.  Additionally, he has normal vital signs and no hypoxia.  He does not have any fever or cough to suggest infectious process such as pneumonia.  He recently had Covid.  He has symmetric  breath sounds and had a normal chest radiograph here yesterday.  Do not feel that additional imaging is indicated.  The patient appears clinically well and appropriate for clinic follow-up.  He will be discharged to the emergency department.  He was encouraged to seek care at his primary care clinic.    I have reviewed the nursing notes. I have reviewed the findings, diagnosis, plan and need for follow up with the patient.    New Prescriptions    No medications on file       Final diagnoses:   Chest wall pain     Chart documentation was completed with Dragon voice-recognition software. Even though reviewed, this chart may still contain some grammatical, spelling, and word errors.     --  Raj Dukes Md  [facility] EMERGENCY DEPARTMENT  1/27/2022     Raj Dukes MD  01/27/22 2273

## 2022-01-28 NOTE — DISCHARGE INSTRUCTIONS
Take acetaminophen and/or ibuprofen as needed for discomfort.    Follow-up with your primary care clinic    Return emergency department if fever, cough, trouble breathing, vomiting, or other concerns.

## 2022-01-30 LAB
ATRIAL RATE - MUSE: 58 BPM
DIASTOLIC BLOOD PRESSURE - MUSE: NORMAL MMHG
INTERPRETATION ECG - MUSE: NORMAL
P AXIS - MUSE: 44 DEGREES
PR INTERVAL - MUSE: 150 MS
QRS DURATION - MUSE: 84 MS
QT - MUSE: 390 MS
QTC - MUSE: 382 MS
R AXIS - MUSE: 1 DEGREES
SYSTOLIC BLOOD PRESSURE - MUSE: NORMAL MMHG
T AXIS - MUSE: 22 DEGREES
VENTRICULAR RATE- MUSE: 58 BPM

## 2022-02-01 ENCOUNTER — HOSPITAL ENCOUNTER (EMERGENCY)
Facility: CLINIC | Age: 27
Discharge: HOME | End: 2022-02-01
Attending: EMERGENCY MEDICINE | Admitting: EMERGENCY MEDICINE
Payer: COMMERCIAL

## 2022-02-01 VITALS
DIASTOLIC BLOOD PRESSURE: 75 MMHG | TEMPERATURE: 98 F | BODY MASS INDEX: 25.79 KG/M2 | SYSTOLIC BLOOD PRESSURE: 119 MMHG | HEART RATE: 60 BPM | WEIGHT: 141 LBS | RESPIRATION RATE: 18 BRPM | OXYGEN SATURATION: 100 %

## 2022-02-01 DIAGNOSIS — F22 PARANOIA (H): ICD-10-CM

## 2022-02-01 LAB
AMPHETAMINES UR QL SCN: NORMAL
BARBITURATES UR QL: NORMAL
BENZODIAZ UR QL: NORMAL
CANNABINOIDS UR QL SCN: NORMAL
COCAINE UR QL: NORMAL
OPIATES UR QL SCN: NORMAL
SARS-COV-2 RNA RESP QL NAA+PROBE: NEGATIVE

## 2022-02-01 PROCEDURE — 80307 DRUG TEST PRSMV CHEM ANLYZR: CPT | Performed by: EMERGENCY MEDICINE

## 2022-02-01 PROCEDURE — 99285 EMERGENCY DEPT VISIT HI MDM: CPT | Mod: 25 | Performed by: EMERGENCY MEDICINE

## 2022-02-01 PROCEDURE — 99284 EMERGENCY DEPT VISIT MOD MDM: CPT | Performed by: EMERGENCY MEDICINE

## 2022-02-01 PROCEDURE — 250N000013 HC RX MED GY IP 250 OP 250 PS 637: Performed by: EMERGENCY MEDICINE

## 2022-02-01 PROCEDURE — 90791 PSYCH DIAGNOSTIC EVALUATION: CPT

## 2022-02-01 PROCEDURE — C9803 HOPD COVID-19 SPEC COLLECT: HCPCS | Performed by: EMERGENCY MEDICINE

## 2022-02-01 PROCEDURE — 87635 SARS-COV-2 COVID-19 AMP PRB: CPT | Performed by: EMERGENCY MEDICINE

## 2022-02-01 RX ORDER — ACETAMINOPHEN 325 MG/1
975 TABLET ORAL ONCE
Status: COMPLETED | OUTPATIENT
Start: 2022-02-01 | End: 2022-02-01

## 2022-02-01 RX ADMIN — ACETAMINOPHEN 975 MG: 325 TABLET, FILM COATED ORAL at 07:37

## 2022-02-01 NOTE — ED NOTES
Emergency Department Patient Sign-out       Brief HPI:  This is a 27 year old male signed out to me by Dr. Govea.  See initial ED Provider note for details of the presentation.            Significant Events prior to my assuming care: Patient given resources for temporary housing. Needs to have COVID and UTox results to bring with him.       Exam:   Patient Vitals for the past 24 hrs:   BP Temp Temp src Pulse Resp SpO2 Weight   02/01/22 0038 114/74 97.6  F (36.4  C) Oral 62 16 97 % 64 kg (141 lb)           ED RESULTS:   Results for orders placed or performed during the hospital encounter of 02/01/22 (from the past 24 hour(s))   Urine Drugs of Abuse Screen     Status: Normal    Collection Time: 02/01/22  6:34 AM    Narrative    The following orders were created for panel order Urine Drugs of Abuse Screen.  Procedure                               Abnormality         Status                     ---------                               -----------         ------                     Drug abuse screen 1 urin...[[id number]]  Normal              Final result                 Please view results for these tests on the individual orders.   Drug abuse screen 1 urine (ED)     Status: Normal    Collection Time: 02/01/22  6:34 AM   Result Value Ref Range    Amphetamines Urine Screen Negative Screen Negative    Barbiturates Urine Screen Negative Screen Negative    Benzodiazepines Urine Screen Negative Screen Negative    Cannabinoids Urine Screen Negative Screen Negative    Cocaine Urine Screen Negative Screen Negative    Opiates Urine Screen Negative Screen Negative   Asymptomatic COVID-19 Virus (Coronavirus) by PCR Nasopharyngeal     Status: Normal    Collection Time: 02/01/22  6:35 AM    Specimen: Nasopharyngeal; Swab   Result Value Ref Range    SARS CoV2 PCR Negative Negative    Narrative    Testing was performed using the faraz  SARS-CoV-2 & Influenza A/B Assay on the faraz  [employer]  System.  This test should be ordered for the  detection of SARS-COV-2 in individuals who meet SARS-CoV-2 clinical and/or epidemiological criteria. Test performance is unknown in asymptomatic patients.  This test is for in vitro diagnostic use under the FDA EUA for laboratories certified under CLIA to perform moderate and/or high complexity testing. This test has not been FDA cleared or approved.  A negative test does not rule out the presence of PCR inhibitors in the specimen or target RNA in concentration below the limit of detection for the assay. The possibility of a false negative should be considered if the patient's recent exposure or clinical presentation suggests COVID-19.  [facility] are certified under the Clinical Laboratory Improvement Amendments of 1988 (CLIA-88) as qualified to perform moderate and/or high complexity laboratory testing.       ED MEDICATIONS:   Medications   acetaminophen (TYLENOL) tablet 975 mg (975 mg Oral Given 2/1/22 0737)         Impression:    ICD-10-CM    1. Paranoia (H)  F22        Plan:    Pending studies include COVID test.    7:45 AM COVID test neg. RN printed both lab results for patient to bring to shelter. Discharged with resources provided by [facility].         MD Shiva Noel Jill C, MD  02/01/22 1953

## 2022-02-01 NOTE — ED NOTES
"2/1/2022  Bedrei Oro 1995     [facility] Crisis Assessment    Patient was assessed: remote  Patient location: [facility] ED    Referral Data and Chief Complaint  Effram \"Bedasso\" Preethi is a 27 year old who uses he/him pronouns. Patient presented to the ED alone and was referred to the ED by self. Patient is presenting to the ED for the following concerns: he is sick of the [place] and wants to leave the country.  He is tired of the helicoptors following him everywhere he goes.      Informed Consent and Assessment Methods    Patient is his own guardian. Writer met with patient and explained the crisis assessment process, including applicable information disclosures and limits to confidentiality, assessed understanding of the process, and obtained consent to proceed with the assessment. Patient was observed to be able to participate in the assessment as evidenced by patient voluntarily participated in assessment and discharge plan. Assessment methods included conducting a formal interview with patient, review of medical records, collaboration with medical staff, and obtaining relevant collateral information from family and community providers when available.    Narrative Summary of Presenting Problem and Current Functioning  What led to the patient presenting for crisis services, factors that make the crisis life threatening or complex, stressors, how is this disrupting the patient's life, and how current functioning is in comparison to baseline. How is patient presenting during the assessment.     Patient is a frequent visitor to the ED.  He recently had COVID.  He is homeless by documentation but tells this writer he has a safe place to stay.  Most often, the patient has presented to the ED with chest pains. He presents as calm, upbeat and cooperative.   He is able to have a mostly linear conversation although he presents with some delusions as it is unlikely there are helicopters following him everywhere.  The patient has " "a sense of humor although he presents as disappointed by this country's treatment of him due to his ethnicity.   He states he was in school but the harrassment by the government ended that and he is distressed and \"sick of it.\"   He has plans to leave [place] he says, then to return home.  He does not address where home is although he has told others in the past that he came here from [facility] 10 years ago.     The patient says he has been diagnosed with \"paranoia.\"  He does not see that as valid.  He also says he does not have depression although he has that dx.   Patient says the medications he has been given in the past are \"too strong.\"   The patient states he has no SI, HI or SIB.  He wants to go to a crisis stabilization placement arranged by the hospital while he works on getting first out of [place] then the [place].     The patient is polite, cooperative and somewhat upbeat although the content of his speech is paranoid, delusional and somewhat sad that he has experienced disappoint and harrassment in the [place]    History of the Crisis  Duration of the current crisis, coping skills attempted to reduce the crisis, community resources used, and past presentations.    The patient has hx of many ED visits, delusional disorder dx, chronic homelessness, elopment and as well as psychiatric placements.  He has a history of medicinal noncompliance.  The patient is not seen as a reliable historian.  He does not tell this writer when he came to the [place] but his English is practically flawless.   He states he had been a student here but the governmental harrassment and discrimination ended his college career.  He does state he has been on meds that are \"too strong\" so he does not take these.     According to his chart, [place] from [facility] [facility] about 10 years ago.   He denies the use of substances.  He denies delusional thinking or depression although he has presented with both now and in the past.         Collateral " Information  Extensive medical chart notes and encounters    Risk Assessment    Risk of Harm to Self     ESS-6  1.a. Over the past 2 weeks, have you had thoughts of killing yourself? No  1.b. Have you ever attempted to kill yourself and, if yes, when did this last happen?  Unclear -- patient has had past depression and SI    2. Recent or current suicide plan? No   3. Recent or current intent to act on ideation? No  4. Lifetime psychiatric hospitalization? Yes  5. Pattern of excessive substance use? No  6. Current irritability, agitation, or aggression? No  Scoring note: BOTH 1a and 1b must be yes for it to score 1 point, if both are not yes it is zero. All others are 1 point per number. If all questions 1a/1b - 6 are no, risk is negligible. If one of 1a/1b is yes, then risk is mild. If either question 2 or 3, but not both, is yes, then risk is automatically moderate regardless of total score. If both 2 and 3 are yes, risk is automatically high regardless of total score.     Score: 2, mild risk    The patient has the following risk factors for suicide: depressive symptoms, lack of support and psychosis    Is the patient experiencing current suicidal ideation: No    Is the patient engaging in preparatory suicide behaviors (formulating how to act on plan, giving away possessions, saying goodbye, displaying dramatic behavior changes, etc)? No    Does the patient have access to firearms or other lethal means? no    The patient has the following protective factors: voluntarily seeking mental health support and displays resiliency     Support system information: patient states he has family in another state    Patient strengths: patient frequently presents to ED for care    Does the patient engage in non-suicidal self-injurious behavior (NSSI/SIB)? no    Is the patient vulnerable to sexual exploitation?  No    Is the patient experiencing abuse or neglect? no    Is the patient a vulnerable adult? No      Risk of Harm to  Others  The patient has the following risk factors of harm to others: no risk factors identified    Does the patient have thoughts of harming others? No    Is the patient engaging in sexually inappropriate behavior?  no       Current Substance Abuse    Is there recent substance abuse? no    Was a urine drug screen or blood alcohol level obtained: No -- requested so patient can potentially get to crisis residence      Current Symptoms/Concerns    Symptoms  Attention, hyperactivity, and impulsivity symptoms present: No    Anxiety symptoms present: Yes: Generalized Symptoms: Excessive worry      Appetite symptoms present: No     Behavioral difficulties present: No     Cognitive impairment symptoms present: No    Depressive symptoms present: Yes Isolative  and Other somewhat sad     Eating disorder symptoms present: No    Learning disabilities, cognitive challenges, and/or developmental disorder symptoms present: No     Manic/hypomanic symptoms present: No    Personality and interpersonal functioning difficulties present : No    Psychosis symptoms present: Yes: Delusions: Persecutory: believes he has been followed by police/helicoptors.  persistent delusion for years and Paranoid: others are out to get him he believes due to his ethnicity      Sleep difficulties present: No    Substance abuse disorder symptoms present: No     Trauma and stressor related symptoms present: pt has a dx once in his chart for PTSD-- specifics not known           Mental Status Exam   Affect: Appropriate   Appearance: Appropriate    Attention Span/Concentration: Attentive?    Eye Contact: Engaged   Fund of Knowledge: Appropriate    Language /Speech Content: Fluent   Language /Speech Volume: Normal    Language /Speech Rate/Productions: Normal    Recent Memory: Variable   Remote Memory: Variable   Mood: Other: somewhat upbeat:  incongruent with content    Orientation to Person: Yes    Orientation to Place: Yes   Orientation to Time of Day: Yes     Orientation to Date: No    Situation (Do they understand why they are here?): Yes    Psychomotor Behavior: Normal    Thought Content: Delusions   Thought Form: Intact       Mental Health and Substance Abuse History    History  Current and historical diagnoses or mental health concerns: schizoaffective dx, MDD    Prior MH services (inpatient, programmatic care, outpatient, etc) : Yes several intermittently over years    History of substance abuse: No    Prior RADHA services (inpatient, programmatic care, detox, outpatient, etc) : No    History of commitment: did not search records/none known or mentioned in records    Family history of MH/RADHA: unknown    Trauma history:  unspecified    Medication  Psychotropic medications: patient is historically noncompliant    Current Care Team  Primary Care Provider: [facility]    Psychiatrist: [facility]    Therapist: No    : unknown       Release of Information  Was a release of information signed: No. Reason: Patient does not address request      Biopsychosocial Information    Socioeconomic Information  Current living situation: homelessness suspected    Employment/income source: unknown    Relevant legal issues: none known    Cultural, Church, or spiritual influences on mental health care: not known    Is the patient active in the  or a : No      Relevant Medical Concerns   Patient identifies concerns with completing ADLs? No     Patient can ambulate independently? Yes     Other medical concerns? No     History of concussion or TBI? unknown        Diagnosis    F25.9  Schizoaffective, unspecified by hx    F33.9 MDD by hx       Therapeutic Intervention  The following therapeutic methodologies were employed when working with the patient: establishing rapport, active listening, assessing dimensions of crisis and establishing a discharge plan. Patient response to intervention: patient was actively engaged in assessment and aftercare  plan.      Disposition  Recommended disposition: Residential Treatment: crisis stabilization or other primarily MH/shelter oriented       Reviewed case and recommendations with attending provider. Attending Name: Portia Govea MD      Attending concurs with disposition: Yes      Patient concurs with disposition: Yes      Guardian concurs with disposition: NA     Final disposition: Residential treatment: Outpatient crisis residential or other MH shelter services .         Clinical Substantiation of Recommendations   Rationale with supporting factors for disposition and diagnosis.     Patient has long hx of homelessness, med noncompliance and presentation to ED.   Patient denies current SI, SIB or HI.     Patient administered drug/covid tests; given resources for crisis and other community services focusing on shelter related.   Patient denies the legitimacy but meets criteria for delusional disorder schizoaffective.  His presentation is incongruent with his verbal content;  He is upbeat, polite and cooperative.  He had recent covid so will get a test done at ED to help make getting to crisis easier.  He wants to go to [facility] and was given a wide range of contact numbers in case there will be no bed there in near future.      Discharge pt with Covid and drug tests to multiple resources he has agreed to contact.  He has a working phone per patient.      Assessment Details  Patient interview started at: 5:30 am and completed at: 6 am.    Total duration spent on the patient case in minutes: .75 hrs     CPT code(s) utilized: 16318 - Psychotherapy for Crisis - 60 (30-74*) min       Aftercare and Safety Planning  Follow up plans with MH/RADHA services: Yes crisis stabilization; patient to contact with information and required pre tests      Aftercare plan placed in the AVS and provided to patient: Yes. Given to patient by ED staff    [facility]icia [facility]      Aftercare Plan  Thank you for allowing us to provide  care for you tonight.  Thank you for working with us to help address your care needs.   We wish the best for you.      As we discussed, a Crisis Stabilization Residence is an appropriate level of care for your needs.  You indicated wanting to go to [facility].     You will need to present a COVID test to them as well as a drug screening.  We are working on providing those for you.    Please note that staff cannot arrange placement, but you can call them at the numbers below to arrange for the soonest available openings.  In the meantime, we have provided some interim resources for you:       [facility]  [email or web address]  [address] [place] [address]   ~12.1 mi  [phone]     If [facility] does not have an available bed, please call [facility] for help at [phone].       Here are some additional resources for shelter and possible other needs you may have in case there is a wait for a crisis bed:     If I am feeling unsafe or I am in a crisis in the meantime (as we discussed), I will:   Contact my established care providers   Call the National Suicide Prevention Lifeline: 489.135.4218   Go to the nearest emergency room   Call 911         For shelter tonight, start with Adult Shelter Connect Overnight Shelter: [phone]  *Phone lines are closed between 5:30-7:30 pm     [address] (enter on the [address] side near the [facility] corner)  Walk-in Hours: 9 am - 5:30 pm Monday-Friday and 1 pm - 5:30 pm Saturday and Sunday     [facility]  [phone] [address] [facility] Sandra's Shelter  [phone]  [address] [facility] Stephen's Shelter  [phone]  [address] [facility]  [phone]  [address] [facility]  [phone] [address] [place] [address]     To start making a plan for a more long-term solution, contact the Coordinated [facility] Homeless  Assistance Program:     Coordinated Entry Homeless Assistance  [facility]  [address] [address]  [phone]  Open Wednesdays and Thursdays 8 am-2 pm  The Coordinated Entry System is the [facility]'s approach to organizing and providing housing services for people experiencing homelessness in [facility].  Because housing resources are limited, this process is designed to ensure that individuals and families with the highest vulnerability, service needs, and length of homelessness receive top priority in housing placement.     Assessment changes due to COVID-19 virus     [facility] family assessors will now be conducting assessments by phone. If you are working with a family staying in a place other than a [facility]-funded shelter and is eligible for Coordinated Entry, continue to contact [facility]  at [phone] to set up an assessment.     For single adults, [facility] will be suspending drop-in hours at [facility]. To have a Coordinated Entry assessment completed, call the [facility]' certified assessors: Cl at [phone] or Juanita at [phone] directly to set up a time to meet     Call 211 at any time on any day for questions about services and resources available to you.        Family Shelters     [facility] Shelter Team  [phone]  [address] [facility], Floors 5 & 6               Youth, families & disabled adults     Hours: Mon-Fri 8:00 am-4:30 pm.  After-Hours Shelter Team  211        Drop-Ins     [facility]  [phone]  [address] [place] [address] ([facility] & [place])               Showers/Laundry (8-11am)               Health Care               Employment Services               Breakfast (7-8 am)               Lunch (11:30am-12:30pm)     Hours: Mon-Sat: Summer 7am-3pm; Winter 7am-4pm.        [facility] [place] [phone]  [address] [place] [address]  Hours: Mon, Wed and  "Fri 9:00-11:30 am        Clothing     [facility]  [phone]  [address]  Hours: M-Th 10-11:30am & 1:30-3:30pm; Sat/Sun 9:30-10:30 am        Free Meals & Showers     [facility]  [phone]  [facility]  Dinner: Mon-Fri 5:30-6:30pm (No showers)     [place] [facility] [place]  [phone]  Meals ([address]): Women & Children M-F 8:30-9am; Everyone: M-F 12-1pm; Sat/Sun, 10:30-11:30 am  Showers ([address]): Mon-Fri 9-10 am     [facility]  [phone]  [address]  Dinner: Thurs - Mon 6 pm  Showers: Daily 8 - 4:30 pm     Health Care     [facility]  [phone]  Clinics are in 11 [place] shelters/drop-in centers.  Hours: Mon-Fri at varying sites. Call for sites/times. No insurance or appts required to receive care.  Clinic sites: [facility] [place], [facility], [facility], [facility], [facility], [facility], [facility], [facility], [facility], [facility]     Crisis Lines  Crisis Text Line  Text 215712  You will be connected with a trained live crisis counselor to provide support.     National Hope Line  1.800.SUICIDE [2624177]        Community Resources  Fast Tracker  Linking people to mental health and substance use disorder resources  fasttrackermn.org      [place] Mental Health Warm Line  Peer to peer support  Monday thru Saturday, 12 pm to 10 pm  992.289.5000 or 6.509.813.9913  Text \"Support\" to 84714     National [place] on Mental Illness (CARLA)  078.568.9591 or 1.888.CARLA.HELPS           Mental Health Apps  My3  https://my3app.org/     VirtualHopeBox  [email or web address]/apps/virtual-hope-box/  "

## 2022-02-01 NOTE — DISCHARGE INSTRUCTIONS
You have recovered from your previous COVID infection and are no longer contagious.     Thank you for allowing us to provide care for you tonight.  Thank you for working with us to help address your care needs.   We wish the best for you.     As we discussed, a Crisis Stabilization Residence is an appropriate level of care for your needs.  You indicated wanting to go to [facility].     You will need to present a COVID test to them as well as a drug screening.  We are working on providing those for you.    Please note that staff cannot arrange placement, but you can call them at the numbers below to arrange for the soonest available openings.  In the meantime, we have provided some interim resources for you:      [facility]  [email or web address]  [address] [place] [address]   ~12.1 mi  [phone]    If [facility] does not have an available bed, please call [facility] for help at [phone].      Here are some additional resources for shelter and possible other needs you may have in case there is a wait for a crisis bed:    If I am feeling unsafe or I am in a crisis in the meantime (as we discussed), I will:   Contact my established care providers   Call the National Suicide Prevention Lifeline: 894.960.3409   Go to the nearest emergency room   Call 911       For shelter tonight, start with Adult Shelter Connect Overnight Shelter: [phone]  *Phone lines are closed between 5:30-7:30 pm    [address] (enter on the [address] side near the [address] [place] [address])  Walk-in Hours: 9 am - 5:30 pm Monday-Friday and 1 pm - 5:30 pm Saturday and Sunday    [facility]  [phone]  [address] [facility] [place] Sandra[facility]ter  [phone]  [facility] Shelter  [phone]  [address] [place] [address]  [phone]  [address] [facility]  [phone]  [address]  [facility]    To start making a plan for a more long-term solution, contact the Coordinated Entry Homeless Assistance Program:    Coordinated Entry Homeless Assistance  [place] [address] [address]  [phone]  Open Wednesdays and Thursdays 8 am-2 pm  The Coordinated Entry System is the [facility]'s approach to organizing and providing housing services for people experiencing homelessness in [facility].  Because housing resources are limited, this process is designed to ensure that individuals and families with the highest vulnerability, service needs, and length of homelessness receive top priority in housing placement.    Assessment changes due to COVID-19 virus    [facility] family assessors will now be conducting assessments by phone. If you are working with a family staying in a place other than a [facility]-funded shelter and is eligible for Coordinated Entry, continue to contact [facility]  at [phone] to set up an assessment.    For single adults, [facility] will be suspending drop-in hours at [facility]. To have a Coordinated Entry assessment completed, call the [facility]' certified assessors: Cl at [phone] or Juanita at [phone] directly to set up a time to meet    Call 211 at any time on any day for questions about services and resources available to you.      Family Shelters    [facility] Shelter Team  [phone]  [address] [facility], Floors 5 & 6              Youth, families & disabled adults    Hours: Mon-Fri 8:00 am-4:30 pm.  After-Hours Shelter Team  211      Drop-Ins    [facility]  [phone]  [address] [place] [address] ([facility] & [place])              Showers/Laundry (8-11am)              Health Care              Employment Services              Breakfast (7-8 am)              Lunch (11:30am-12:30pm)    Hours: Mon-Sat: Summer 7am-3pm;  "Winter 7am-4pm.      [facility] [phone]  [address] [place] [address]  Hours: Mon, Wed and Fri 9:00-11:30 am      Clothing    [facility]  [phone]  [address]  Hours: M-Th 10-11:30am & 1:30-3:30pm; Sat/Sun 9:30-10:30 am      Free Meals & Showers    [facility]  [phone]  [address] [place] [address]  Dinner: Mon-Fri 5:30-6:30pm (No showers)    [facility]  [phone]  Meals ([address]): Women & Children M-F 8:30-9am; Everyone: M-F 12-1pm; Sat/Sun, 10:30-11:30 am  Showers ([address]): Mon-Fri 9-10 am    [facility]  [phone]  [address]  Dinner: Thurs - Mon 6 pm  Showers: Daily 8 - 4:30 pm    Health Care    [facility]eless  [phone]  Clinics are in 11 [place] shelters/drop-in centers.  Hours: Mon-Fri at varying sites. Call for sites/times. No insurance or appts required to receive care.  Clinic sites: Adult [facility], [facility], [facility], [facility], [facility], [facility], [facility], [facility], [facility]    Crisis Lines  Crisis Text Line  Text 760699  You will be connected with a trained live crisis counselor to provide support.    National Hope Line  1.800.SUICIDE [1987693]      Community Resources  Fast Tracker  Linking people to mental health and substance use disorder resources  fasttrackermn.org     [place] Mental Health Warm Line  Peer to peer support  Monday thru Saturday, 12 pm to 10 pm  618.586.6152 or 5.229.603.4147  Text \"Support\" to 91391    National [place] on Mental Illness (CARLA)  121.844.2796 or 1.888.CARLA.HELPS        Mental Health Apps  My3  https://my3app.org/    VirtualHopeBox  https://[employer].org/apps/virtual-hope-box/          "

## 2022-02-01 NOTE — ED PROVIDER NOTES
[facility] EMERGENCY DEPARTMENT ([facility])    2/01/22    HW05  History     Chief Complaint   Patient presents with     Suicidal     The history is provided by the patient.     Ravindra Oro is a 27 year old male with history of schizoaffective disorder presents to the ER stating that he is sick of this country and he just cannot put up with it anymore.  He has indicated some suicidal ideation.  He did not provide any plans.  He states that he feels like he is being harassed by the government.  He states that the police, firefighters, etc. are harassing him.  He states he sees helicopters wherever he goes and believes are following him.  He states he does not want to live here anymore, just cannot put up with it anymore.  He denies any acute physical complaints such as fever, cough, shortness of breath, vomiting, diarrhea.    This part of the medical record was transcribed by Suzan Reddy Medical Scribe, from a dictation done by Portia Govea MD.     Past Medical History  Past Medical History:   Diagnosis Date     Depressive disorder      Psychosis (H)      Schizoaffective disorder (H)      History reviewed. No pertinent surgical history.  acetaminophen (TYLENOL) 325 MG tablet  acetaminophen (TYLENOL) 500 MG tablet  cholecalciferol (VITAMIN D) 1000 UNIT tablet  cyanocobalamin (VITAMIN B-12) 1000 MCG tablet  famotidine (PEPCID) 20 MG tablet  Fluocinolone Acetonide (DERMA-SMOOTHE/FS SCALP) 0.01 % OIL  FLUoxetine (PROZAC) 10 MG capsule  FLUoxetine (PROZAC) 20 MG capsule  fluticasone (FLONASE) 50 MCG/ACT nasal spray  ibuprofen (ADVIL/MOTRIN) 800 MG tablet  ketoconazole (NIZORAL) 2 % shampoo  mirtazapine (REMERON) 30 MG tablet  OLANZapine (ZYPREXA) 5 MG tablet  OLANZapine zydis (ZYPREXA) 5 MG ODT  sertraline (ZOLOFT) 50 MG tablet  triamcinolone (KENALOG) 0.025 % external ointment      No Known Allergies  Family History  Family History   Problem Relation Age of Onset     Cancer No family hx of         No  family history of skin cancer     Melanoma No family hx of      Skin Cancer No family hx of      Social History   Social History     Tobacco Use     Smoking status: Never Smoker     Smokeless tobacco: Never Used   Substance Use Topics     Alcohol use: Never     Drug use: Not Currently     Types: Marijuana      Past medical history, past surgical history, medications, allergies, family history, and social history were reviewed with the patient. No additional pertinent items.       Review of Systems  A complete review of systems was performed with pertinent positives and negatives noted in the HPI, and all other systems negative.    Physical Exam   BP: 114/74  Pulse: 62  Temp: 97.6  F (36.4  C)  Resp: 16  Weight: 64 kg (141 lb)  SpO2: 97 %  Physical Exam  Constitutional:       General: He is not in acute distress.     Appearance: He is not diaphoretic.   HENT:      Head: Atraumatic.   Eyes:      General: No scleral icterus.     Pupils: Pupils are equal, round, and reactive to light.   Cardiovascular:      Heart sounds: Normal heart sounds.   Pulmonary:      Effort: No respiratory distress.      Breath sounds: Normal breath sounds.   Abdominal:      Palpations: Abdomen is soft.      Tenderness: There is no abdominal tenderness.   Musculoskeletal:         General: No tenderness.   Skin:     General: Skin is warm.         ED Course      Procedures                     Results for orders placed or performed during the hospital encounter of 02/01/22   Urine Drugs of Abuse Screen     Status: None (In process)    Narrative    The following orders were created for panel order Urine Drugs of Abuse Screen.  Procedure                               Abnormality         Status                     ---------                               -----------         ------                     Drug abuse screen 1 urin...[[id number]]                      In process                   Please view results for these tests on the individual orders.      Medications - No data to display     Assessments & Plan (with Medical Decision Making)   The patient did speak with the mental health .  He is not at all open to the idea that he might have a mental illness.  He thinks that this country is causing him problems.  He adamantly denies any sort of plan or intent to kill himself.  The  did offer him information regarding emergency shelter type placement.  She asked us to perform a drug screen as well as a Covid test which would help him get into these facilities.  It is noted that he did have a positive Covid on 1/11/2022.  However, if his Covid is negative today to make things easier for him.  Otherwise, we can potentially write him a note that he is recovered Covid.  Regardless he is physically asymptomatic today and appears medically stable.  He can be discharged once these come back.  He is signed out to the oncoming provider pending these results.    Dictation Disclaimer: Some of this Note has been completed with voice-recognition dictation software. Although errors are generally corrected real-time, there is the potential for a rare error to be present in the completed chart.      I have reviewed the nursing notes. I have reviewed the findings, diagnosis, plan and need for follow up with the patient.    New Prescriptions    No medications on file       Final diagnoses:   Paranoia (H)       --  Portia Govea MD  [facility] ED EMERGENCY DEPARTMENT  2/1/2022     Portia Govea MD  02/01/22 0656

## 2022-02-01 NOTE — ED TRIAGE NOTES
"Pt. States extremely depressed.  \"I don't want to be in this country anymore it is to fucked up. If I have to stay I would rather be dead.\"  No plans for suicide.  "

## 2022-02-09 PROCEDURE — 93308 TTE F-UP OR LMTD: CPT

## 2022-02-09 PROCEDURE — 99285 EMERGENCY DEPT VISIT HI MDM: CPT | Mod: 25

## 2022-02-09 PROCEDURE — 93005 ELECTROCARDIOGRAM TRACING: CPT

## 2022-02-09 PROCEDURE — 93010 ELECTROCARDIOGRAM REPORT: CPT | Performed by: EMERGENCY MEDICINE

## 2022-02-09 PROCEDURE — 99285 EMERGENCY DEPT VISIT HI MDM: CPT | Mod: 25 | Performed by: EMERGENCY MEDICINE

## 2022-02-10 ENCOUNTER — HOSPITAL ENCOUNTER (EMERGENCY)
Facility: CLINIC | Age: 27
Discharge: HOME | End: 2022-02-10
Attending: EMERGENCY MEDICINE | Admitting: EMERGENCY MEDICINE
Payer: COMMERCIAL

## 2022-02-10 VITALS
SYSTOLIC BLOOD PRESSURE: 141 MMHG | BODY MASS INDEX: 25.41 KG/M2 | TEMPERATURE: 97.4 F | RESPIRATION RATE: 15 BRPM | HEART RATE: 60 BPM | OXYGEN SATURATION: 97 % | DIASTOLIC BLOOD PRESSURE: 94 MMHG | WEIGHT: 138.9 LBS

## 2022-02-10 VITALS
OXYGEN SATURATION: 100 % | DIASTOLIC BLOOD PRESSURE: 82 MMHG | RESPIRATION RATE: 16 BRPM | TEMPERATURE: 97.8 F | SYSTOLIC BLOOD PRESSURE: 130 MMHG | HEART RATE: 55 BPM

## 2022-02-10 DIAGNOSIS — R10.13 DYSPEPSIA: ICD-10-CM

## 2022-02-10 DIAGNOSIS — Z59.00 HOMELESSNESS: ICD-10-CM

## 2022-02-10 DIAGNOSIS — F32.A DEPRESSION, UNSPECIFIED DEPRESSION TYPE: ICD-10-CM

## 2022-02-10 DIAGNOSIS — R07.89 ATYPICAL CHEST PAIN: ICD-10-CM

## 2022-02-10 DIAGNOSIS — Z76.5 MALINGERING: ICD-10-CM

## 2022-02-10 LAB
ALBUMIN SERPL-MCNC: 4.1 G/DL (ref 3.4–5)
ALP SERPL-CCNC: 51 U/L (ref 40–150)
ALT SERPL W P-5'-P-CCNC: 34 U/L (ref 0–70)
ANION GAP SERPL CALCULATED.3IONS-SCNC: 7 MMOL/L (ref 3–14)
AST SERPL W P-5'-P-CCNC: 21 U/L (ref 0–45)
ATRIAL RATE - MUSE: 63 BPM
BILIRUB SERPL-MCNC: 0.4 MG/DL (ref 0.2–1.3)
BUN SERPL-MCNC: 17 MG/DL (ref 7–30)
CALCIUM SERPL-MCNC: 8.6 MG/DL (ref 8.5–10.1)
CHLORIDE BLD-SCNC: 107 MMOL/L (ref 94–109)
CO2 SERPL-SCNC: 26 MMOL/L (ref 20–32)
CREAT SERPL-MCNC: 0.67 MG/DL (ref 0.66–1.25)
DIASTOLIC BLOOD PRESSURE - MUSE: NORMAL MMHG
GFR SERPL CREATININE-BSD FRML MDRD: >90 ML/MIN/1.73M2
GLUCOSE BLD-MCNC: 101 MG/DL (ref 70–99)
INTERPRETATION ECG - MUSE: NORMAL
LIPASE SERPL-CCNC: 82 U/L (ref 73–393)
P AXIS - MUSE: 59 DEGREES
POTASSIUM BLD-SCNC: 3.6 MMOL/L (ref 3.4–5.3)
PR INTERVAL - MUSE: 150 MS
PROT SERPL-MCNC: 7.2 G/DL (ref 6.8–8.8)
QRS DURATION - MUSE: 84 MS
QT - MUSE: 404 MS
QTC - MUSE: 413 MS
R AXIS - MUSE: 36 DEGREES
SODIUM SERPL-SCNC: 140 MMOL/L (ref 133–144)
SYSTOLIC BLOOD PRESSURE - MUSE: NORMAL MMHG
T AXIS - MUSE: 49 DEGREES
TROPONIN I SERPL HS-MCNC: 4 NG/L
VENTRICULAR RATE- MUSE: 63 BPM

## 2022-02-10 PROCEDURE — 99282 EMERGENCY DEPT VISIT SF MDM: CPT | Performed by: EMERGENCY MEDICINE

## 2022-02-10 PROCEDURE — 80053 COMPREHEN METABOLIC PANEL: CPT | Performed by: EMERGENCY MEDICINE

## 2022-02-10 PROCEDURE — 250N000009 HC RX 250: Performed by: EMERGENCY MEDICINE

## 2022-02-10 PROCEDURE — 250N000013 HC RX MED GY IP 250 OP 250 PS 637: Performed by: EMERGENCY MEDICINE

## 2022-02-10 PROCEDURE — 83690 ASSAY OF LIPASE: CPT | Performed by: EMERGENCY MEDICINE

## 2022-02-10 PROCEDURE — 99283 EMERGENCY DEPT VISIT LOW MDM: CPT

## 2022-02-10 PROCEDURE — 36415 COLL VENOUS BLD VENIPUNCTURE: CPT | Performed by: EMERGENCY MEDICINE

## 2022-02-10 PROCEDURE — 84484 ASSAY OF TROPONIN QUANT: CPT | Performed by: EMERGENCY MEDICINE

## 2022-02-10 RX ORDER — ACETAMINOPHEN 500 MG
1000 TABLET ORAL ONCE
Status: DISCONTINUED | OUTPATIENT
Start: 2022-02-10 | End: 2022-02-10 | Stop reason: HOSPADM

## 2022-02-10 RX ORDER — FAMOTIDINE 20 MG/1
20 TABLET, FILM COATED ORAL 2 TIMES DAILY
Qty: 40 TABLET | Refills: 0 | Status: SHIPPED | OUTPATIENT
Start: 2022-02-10

## 2022-02-10 RX ADMIN — LIDOCAINE HYDROCHLORIDE 30 ML: 20 SOLUTION ORAL; TOPICAL at 01:20

## 2022-02-10 SDOH — ECONOMIC STABILITY - HOUSING INSECURITY: HOMELESSNESS UNSPECIFIED: Z59.00

## 2022-02-10 NOTE — DISCHARGE INSTRUCTIONS
Instructions from your doctor today:  Emergency Department testing is focused on the potential causes of your symptoms that are the most dangerous possibilities, and cannot cover every possibility. Based on the evaluation, it was deemed sufficiently safe to discharge and continue management through the clinics. Thus, follow-up is very important to assess for improvement/worsening, potential further testing, and potential treatment adjustments. If you were given opioid pain medications or other medications that can make you drowsy while in the ED, you should not drive for at least several hours and not until you feel completely back to normal.     Please make an appointment to follow up with:  - Your Primary Care Provider in 3-7 days  - If you do not have a primary care provider, you can be seen in follow-up and establish care by calling any of the clinics below:     - [facility] (phone: [phone])     - [facility] (phone: [phone])   - Have your clinic provider review the results from today's visit with you again, including any potential follow-up or additional testing that may be needed based on the results. Occasionally, incidental findings are found on later review by radiologists that may need follow-up.     Return to the Emergency Department immediately if you have worsening symptoms, or any other urgent or potentially life-threatening concerns.

## 2022-02-10 NOTE — ED PROVIDER NOTES
History     Chief Complaint   Patient presents with     Chest Pain     HPI  Ravindra Oro is a 27 year old male with a PMH of homelessness, depression, anxiety, suicidal ideation, delusional disorder and alopecia who presents to the ED with chest pain.  Patient reports pain started 5 days ago.  He reports that he has a longer history of pain as well, but that is been worse over the past 5 or so days.  No shortness of breath, no recent cough, no recent fever, no recent vomiting.  Pain is substernal aching quality and somewhat radiates up from the left upper quadrant abdomen..    Past Medical History  Past Medical History:   Diagnosis Date     Depressive disorder      Psychosis (H)      Schizoaffective disorder (H)      No past surgical history on file.  famotidine (PEPCID) 20 MG tablet  acetaminophen (TYLENOL) 325 MG tablet  acetaminophen (TYLENOL) 500 MG tablet  cholecalciferol (VITAMIN D) 1000 UNIT tablet  cyanocobalamin (VITAMIN B-12) 1000 MCG tablet  Fluocinolone Acetonide (DERMA-SMOOTHE/FS SCALP) 0.01 % OIL  FLUoxetine (PROZAC) 10 MG capsule  FLUoxetine (PROZAC) 20 MG capsule  fluticasone (FLONASE) 50 MCG/ACT nasal spray  ibuprofen (ADVIL/MOTRIN) 800 MG tablet  ketoconazole (NIZORAL) 2 % shampoo  mirtazapine (REMERON) 30 MG tablet  OLANZapine (ZYPREXA) 5 MG tablet  OLANZapine zydis (ZYPREXA) 5 MG ODT  sertraline (ZOLOFT) 50 MG tablet  triamcinolone (KENALOG) 0.025 % external ointment      No Known Allergies  Social History   Social History     Tobacco Use     Smoking status: Never Smoker     Smokeless tobacco: Never Used   Substance Use Topics     Alcohol use: Never     Drug use: Not Currently     Types: Marijuana      Past medical history and social history were reviewed with the patient. Additional pertinent items: None     Review of Systems  A complete review of systems was performed with pertinent positives and negatives noted in the HPI, and all other systems negative.    Physical Exam   BP: (!)  148/95  Pulse: 55  Temp: 97.8  F (36.6  C)  Resp: 16  SpO2: 100 %    Physical Exam  General: no acute distress. Appears stated age.   HENT: MMM, no oropharyngeal lesions  Eyes: PERRL, normal sclerae  Cardio: regular rate. Regular rhythm. Extremities well perfused  Resp: Normal work of breathing, normal respiratory rate.  Abdomen: epigastric and LUQ tenderness, non-distended, no rebound, no guarding  Neuro: alert and fully oriented. CN II-XII grossly intact. Grossly normal strength and sensation in all extremities.   MSK: no deformities. Grossly normal ROM in extremities.   Integumentary/Skin: no rash visualized, normal color  Psych: normal affect, normal behavior    ED Course      Procedures  Results for orders placed during the hospital encounter of 02/10/22    POC US ECHO LIMITED    Impression  Limited Bedside ED Cardiac Ultrasound  PROCEDURE: PERFORMED BY: Dr. Harish Rosenbaum MD  INDICATIONS/SYMPTOM:  Chest Pain  PROBE: Cardiac phased array probe  BODY LOCATION: Chest (cardiac)  FINDINGS: The ultrasound was performed utilizing the subcostal, parasternal long axis, parasternal short axis and apical 4 chamber views. Subxiphoid view was limited by poor patient tolerance.  Grossly normal LV contractility. No RV dilation visualized. No pericardial effusion visualized.  INTERPRETATION:    Grossly normal LV contractility. No pericardial effusion. No RV dilation.  IMAGE DOCUMENTATION: Images were archived to PACs system.            EKG Interpretation:      Interpreted by Harish Rosenbaum MD  Time reviewed: 0107  Symptoms at time of EKG: chest pain   Rhythm: normal sinus   Rate: normal  Axis: normal  Ectopy: none  Conduction: normal  ST Segments/ T Waves: No ST-T wave changes  Q Waves: none  Comparison to prior: Unchanged from 1/25/2022    Clinical Impression: normal EKG             Labs Ordered and Resulted from Time of ED Arrival to Time of ED Departure   COMPREHENSIVE METABOLIC PANEL - Abnormal       Result Value     Sodium 140      Potassium 3.6      Chloride 107      Carbon Dioxide (CO2) 26      Anion Gap 7      Urea Nitrogen 17      Creatinine 0.67      Calcium 8.6      Glucose 101 (*)     Alkaline Phosphatase 51      AST 21      ALT 34      Protein Total 7.2      Albumin 4.1      Bilirubin Total 0.4      GFR Estimate >90     TROPONIN I - Normal    Troponin I High Sensitivity 4     LIPASE - Normal    Lipase 82       POC US ECHO LIMITED   Final Result   Limited Bedside ED Cardiac Ultrasound   PROCEDURE: PERFORMED BY: Dr. Harish Rosenbaum MD   INDICATIONS/SYMPTOM:  Chest Pain   PROBE: Cardiac phased array probe   BODY LOCATION: Chest (cardiac)   FINDINGS: The ultrasound was performed utilizing the subcostal, parasternal long axis, parasternal short axis and apical 4 chamber views. Subxiphoid view was limited by poor patient tolerance.    Grossly normal LV contractility. No RV dilation visualized. No pericardial effusion visualized.    INTERPRETATION:    Grossly normal LV contractility. No pericardial effusion. No RV dilation.   IMAGE DOCUMENTATION: Images were archived to PACs system.                Assessments & Plan (with Medical Decision Making)   Patient presenting with chest pain. Vitals in the ED unremarkable.  Exam unremarkable. Nursing notes reviewed.     Chart review notable for 31 other emergency department visits in the past 2 months.  Diagnoses mostly chest pain, schizoaffective disorder, homelessness, and malingering.  No dangerous pathology found during earlier evaluations.    ECG shows NSR without evidence of acute ischemia, high sensitivity troponin normal - ACS very unlikely. VTE risk factor profile very low, PERC met - PE very unlikely. Character and severity of pain less severe than would be expected for aortic dissection. Bedside cardiac US demonstrates grossly normal LV contractility, no RV dilation, no pericardial effusion. Lack of ECG findings and lack of effusion makes pericarditis very unlikely.   No LFT elevations to suggest hepatobiliary pathology.  No lipase elevation to suggest pancreatitis.    In the ED, the patient's symptoms were managed with GI cocktail, with improvement in symptoms upon reassessment.     After counseling on the diagnosis, work-up, and treatment plan, the patient was discharged to home. The patient was advised to follow-up with primary care in a few days. The patient was advised to return to the ED if worsening symptoms, or if there are any urgent/life-threatening concerns.     Final diagnoses:   Atypical chest pain   Dyspepsia     New Prescriptions    FAMOTIDINE (PEPCID) 20 MG TABLET    Take 1 tablet (20 mg) by mouth 2 times daily       --  Harish Rosenbaum MD   Emergency Medicine   [facility] EMERGENCY DEPARTMENT  2/9/2022     Harish Rosenbaum MD  02/10/22 0149       Harish Rosenbaum MD  02/10/22 0426

## 2022-02-10 NOTE — ED PROVIDER NOTES
"ED Provider Note  [facility]      History     Chief Complaint   Patient presents with     Depression     \"I depressed and mad at the world.\" Denies SI/HI.     HPI  Ravindra Oro is a 27 year old male who presents to the ED with complaint of depression and feeling hopeless.  He is homeless, has frequent visits to the ED.  He frequently complains of paranoia and delusions/hallucinations including feeling that the police and other governmental agents are following him or trying to persecute him.  He does not believe he has a mental illness.  He repeatedly states that he hates this country and wants to return to [place].  Today he states that he is frustrated because he feels that no matter how much he does the right thing, nothing gets better for him.  He states that it is hard to keep living like this.  He does not report any active suicidal ideation or plan/intent however.  He previously has declined therapy, but now states that he may be interested in doing this.    He denies illness today, the does state that he has some soreness in his in his legs from walking, and requests some Tylenol or ibuprofen.    Past Medical History  Past Medical History:   Diagnosis Date     Depressive disorder      Psychosis (H)      Schizoaffective disorder (H)      History reviewed. No pertinent surgical history.  acetaminophen (TYLENOL) 325 MG tablet  acetaminophen (TYLENOL) 500 MG tablet  cholecalciferol (VITAMIN D) 1000 UNIT tablet  cyanocobalamin (VITAMIN B-12) 1000 MCG tablet  famotidine (PEPCID) 20 MG tablet  FLUoxetine (PROZAC) 10 MG capsule  FLUoxetine (PROZAC) 20 MG capsule  fluticasone (FLONASE) 50 MCG/ACT nasal spray  ibuprofen (ADVIL/MOTRIN) 800 MG tablet  mirtazapine (REMERON) 30 MG tablet  OLANZapine (ZYPREXA) 5 MG tablet  sertraline (ZOLOFT) 50 MG tablet  triamcinolone (KENALOG) 0.025 % external ointment  Fluocinolone Acetonide (DERMA-SMOOTHE/FS SCALP) 0.01 % OIL  ketoconazole (NIZORAL) 2 % " shampoo  OLANZapine zydis (ZYPREXA) 5 MG ODT      No Known Allergies  Family History  Family History   Problem Relation Age of Onset     Cancer No family hx of         No family history of skin cancer     Melanoma No family hx of      Skin Cancer No family hx of      Social History   Social History     Tobacco Use     Smoking status: Never Smoker     Smokeless tobacco: Never Used   Substance Use Topics     Alcohol use: Never     Drug use: Not Currently      Past medical history, past surgical history, medications, allergies, family history, and social history were reviewed with the patient. No additional pertinent items.       Review of Systems  A complete review of systems was performed with pertinent positives and negatives noted in the HPI, and all other systems negative.    Physical Exam   BP: (!) 141/94  Pulse: 60  Temp: 97.4  F (36.3  C)  Resp: 15  Weight: 63 kg (138 lb 14.4 oz)  SpO2: 97 %  Physical Exam  Constitutional:       General: He is not in acute distress.     Appearance: He is not diaphoretic.   HENT:      Head: Atraumatic.   Eyes:      General: No scleral icterus.  Cardiovascular:      Heart sounds: Normal heart sounds.   Pulmonary:      Effort: No respiratory distress.      Breath sounds: Normal breath sounds.   Abdominal:      Palpations: Abdomen is soft.      Tenderness: There is no abdominal tenderness.   Musculoskeletal:         General: No deformity.   Skin:     General: Skin is warm.      Findings: No rash.         ED Course      Procedures                     Results for orders placed or performed during the hospital encounter of 02/10/22   POC US ECHO LIMITED     Status: None    Impression    Limited Bedside ED Cardiac Ultrasound  PROCEDURE: PERFORMED BY: Dr. Harish Rosenbaum MD  INDICATIONS/SYMPTOM:  Chest Pain  PROBE: Cardiac phased array probe  BODY LOCATION: Chest (cardiac)  FINDINGS: The ultrasound was performed utilizing the subcostal, parasternal long axis, parasternal short axis  and apical 4 chamber views. Subxiphoid view was limited by poor patient tolerance.   Grossly normal LV contractility. No RV dilation visualized. No pericardial effusion visualized.   INTERPRETATION:    Grossly normal LV contractility. No pericardial effusion. No RV dilation.  IMAGE DOCUMENTATION: Images were archived to PACs system.     Troponin I     Status: Normal   Result Value Ref Range    Troponin I High Sensitivity 4 <79 ng/L   Comprehensive metabolic panel     Status: Abnormal   Result Value Ref Range    Sodium 140 133 - 144 mmol/L    Potassium 3.6 3.4 - 5.3 mmol/L    Chloride 107 94 - 109 mmol/L    Carbon Dioxide (CO2) 26 20 - 32 mmol/L    Anion Gap 7 3 - 14 mmol/L    Urea Nitrogen 17 7 - 30 mg/dL    Creatinine 0.67 0.66 - 1.25 mg/dL    Calcium 8.6 8.5 - 10.1 mg/dL    Glucose 101 (H) 70 - 99 mg/dL    Alkaline Phosphatase 51 40 - 150 U/L    AST 21 0 - 45 U/L    ALT 34 0 - 70 U/L    Protein Total 7.2 6.8 - 8.8 g/dL    Albumin 4.1 3.4 - 5.0 g/dL    Bilirubin Total 0.4 0.2 - 1.3 mg/dL    GFR Estimate >90 >60 mL/min/1.73m2   Lipase     Status: Normal   Result Value Ref Range    Lipase 82 73 - 393 U/L   EKG 12 lead     Status: None (Preliminary result)   Result Value Ref Range    Systolic Blood Pressure  mmHg    Diastolic Blood Pressure  mmHg    Ventricular Rate 63 BPM    Atrial Rate 63 BPM    CA Interval 150 ms    QRS Duration 84 ms     ms    QTc 413 ms    P Axis 59 degrees    R AXIS 36 degrees    T Axis 49 degrees    Interpretation ECG       Sinus rhythm with sinus arrhythmia  Cannot rule out Anterior infarct , age undetermined  Abnormal ECG     [place] Draw *Canceled*     Status: None ()    Narrative    The following orders were created for panel order [place] Draw.  Procedure                               Abnormality         Status                     ---------                               -----------         ------                       Please view results for these tests on the individual orders.      Medications   acetaminophen (TYLENOL) tablet 1,000 mg (1,000 mg Oral Not Given 2/10/22 0457)        Assessments & Plan (with Medical Decision Making)   The patient is a frequent visitor to the ED.  I saw him myself last week.  The patient has some vague passive SI but no plan or intent.  He previously has declined therapy, does not feel he is mentally ill so is not interested in medications.  Today I asked him what he was hoping we would do for him.  He states he wants to go back to [place].  I made it clear that this is not something I can help arrange, and asked what we could do to help him today.  He then said that he would be willing to go to therapy if we can arrange this.  I did asked the mental health  to see the patient.  He asked for Tylenol for his leg pain and this was offered.  However, short time later he did request discharge.  He is not holdable.  It was at that point I realized that he was seen at the [place] emergency department [facility] with a complaint of chest pain epigastric pain.  He did mention that to me at all, and upon further investigation it seems that he came directly from that ER to this ER.  He told me he had no other complaints aside from sore legs from walking.  I do suspect malingering.  He is not holdable.  He will be discharged at this time.  I did give him follow-up information for the walk-in counseling center.    Dictation Disclaimer: Some of this Note has been completed with voice-recognition dictation software. Although errors are generally corrected real-time, there is the potential for a rare error to be present in the completed chart.      I have reviewed the nursing notes. I have reviewed the findings, diagnosis, plan and need for follow up with the patient.    Discharge Medication List as of 2/10/2022  4:52 AM          Final diagnoses:   Depression, unspecified depression type   Malingering   Homelessness       --  Portia Govea  [facility]  EMERGENCY DEPARTMENT  2/10/2022     Portia Govea MD  02/10/22 0616

## 2022-02-10 NOTE — ED TRIAGE NOTES
"Pt was at work and started having some midsternal chest pain. He states that he has chronic chest pain, but tonight \"it hurts worse.\" Rates pain a 7/10.    He also feels as though his heart was racing for a moment, but now reports that it is fine.    He was VSS in EMS.     He refused ASA, NTG and an IV by EMS.      Patient is bradycardic in triage: 59.    He is resting comfortably in triage.   "

## 2022-02-10 NOTE — DISCHARGE INSTRUCTIONS
[phone] Return with any concerns.

## 2024-01-23 NOTE — ED NOTES
Bed: ED13  Expected date:   Expected time:   Means of arrival:   Comments:  [facility]EM[facility] now   weight-bearing as tolerated